# Patient Record
Sex: MALE | Race: WHITE | NOT HISPANIC OR LATINO | Employment: OTHER | URBAN - METROPOLITAN AREA
[De-identification: names, ages, dates, MRNs, and addresses within clinical notes are randomized per-mention and may not be internally consistent; named-entity substitution may affect disease eponyms.]

---

## 2017-10-09 DIAGNOSIS — M54.16 LUMBAR RADICULOPATHY: Primary | ICD-10-CM

## 2017-10-13 ENCOUNTER — HOSPITAL ENCOUNTER (OUTPATIENT)
Dept: RADIOLOGY | Facility: HOSPITAL | Age: 72
Discharge: HOME | End: 2017-10-13
Attending: FAMILY MEDICINE
Payer: MEDICARE

## 2017-10-13 DIAGNOSIS — M54.16 LUMBAR RADICULOPATHY: ICD-10-CM

## 2017-10-13 PROCEDURE — 72148 MRI LUMBAR SPINE W/O DYE: CPT | Mod: TC,PO

## 2017-11-02 ENCOUNTER — OFFICE VISIT (OUTPATIENT)
Dept: PAIN MEDICINE | Facility: CLINIC | Age: 72
End: 2017-11-02
Payer: MEDICARE

## 2017-11-02 VITALS
WEIGHT: 257 LBS | DIASTOLIC BLOOD PRESSURE: 80 MMHG | RESPIRATION RATE: 18 BRPM | BODY MASS INDEX: 38.95 KG/M2 | HEART RATE: 80 BPM | SYSTOLIC BLOOD PRESSURE: 128 MMHG | HEIGHT: 68 IN

## 2017-11-02 DIAGNOSIS — M47.816 LUMBAR SPONDYLOSIS: ICD-10-CM

## 2017-11-02 DIAGNOSIS — M54.42 BILATERAL LOW BACK PAIN WITH BILATERAL SCIATICA, UNSPECIFIED CHRONICITY: Primary | ICD-10-CM

## 2017-11-02 DIAGNOSIS — M54.41 BILATERAL LOW BACK PAIN WITH BILATERAL SCIATICA, UNSPECIFIED CHRONICITY: Primary | ICD-10-CM

## 2017-11-02 DIAGNOSIS — M51.36 DDD (DEGENERATIVE DISC DISEASE), LUMBAR: ICD-10-CM

## 2017-11-02 DIAGNOSIS — M48.061 SPINAL STENOSIS OF LUMBAR REGION, UNSPECIFIED WHETHER NEUROGENIC CLAUDICATION PRESENT: ICD-10-CM

## 2017-11-02 PROCEDURE — 99203 OFFICE O/P NEW LOW 30 MIN: CPT | Mod: S$GLB,,, | Performed by: ANESTHESIOLOGY

## 2017-11-02 PROCEDURE — 99999 PR PBB SHADOW E&M-EST. PATIENT-LVL III: CPT | Mod: PBBFAC,,, | Performed by: ANESTHESIOLOGY

## 2017-11-02 RX ORDER — PANTOPRAZOLE SODIUM 40 MG/1
40 TABLET, DELAYED RELEASE ORAL DAILY
Status: ON HOLD | COMMUNITY
Start: 2017-09-18 | End: 2021-03-14 | Stop reason: HOSPADM

## 2017-11-02 RX ORDER — ALLOPURINOL 300 MG/1
300 TABLET ORAL DAILY
COMMUNITY
Start: 2017-07-26 | End: 2018-10-29

## 2017-11-02 RX ORDER — GABAPENTIN 300 MG/1
CAPSULE ORAL
COMMUNITY
Start: 2017-09-18 | End: 2017-11-02

## 2017-11-02 RX ORDER — LEVOTHYROXINE SODIUM 100 UG/1
100 TABLET ORAL DAILY
COMMUNITY
Start: 2017-09-19

## 2017-11-02 NOTE — PROGRESS NOTES
Chronic Pain - New Consult    Referring Physician: Self, Aaareferral      Chief Complaint   Patient presents with    Back Pain        SUBJECTIVE:    Philip Kam Sr. is a 71 y/o male with hx of T2DM who presents to the clinic for the evaluation of low back pain. The pain started 1 month ago and symptoms have been persistent. He first noticed the pain after working on his deck moving heavy boards one month ago. The patient denies any history of chronic back problems. He reports an episode of sciatica >25 years ago that resolved with PT. The pain is located in the bilateral lower lumbar area and radiates into the posterior thighs stopping at the knee.   The pain is described as sharp and is rated as 0/10 currently. Average pain intensity is 6/10. The pain is rated with a score of  0/10 on the BEST day and a score of 7/10 on the WORST day.  Symptoms interfere with daily activity and sleeping. The pain is exacerbated by sitting and standing and for long periods of time.  The pain is mitigated by heat, activity, walking and bending forward at the waist. The patient reports 6 hours of uninterrupted sleep per night.    Patient denies night fever/night sweats, urinary incontinence, bowel incontinence, significant weight loss, significant motor weakness and loss of sensations.    Physical Therapy/Home Exercise: no      Pain Disability Index Review:  Last 3 PDI Scores 11/2/2017   Pain Disability Index (PDI) 33       Pain Medications:    - Tylenol arthritis BID as needed     report:  Reviewed    Pain Procedures: None    Imaging:     MRI LUMBAR SPINE WITHOUT CONTRAST (10/13/2017):      FINDINGS: No fracture or suspicious bone lesion identified. Lumbar spine alignment is anatomic. The distal cord and conus reveal normal signal and morphology terminating near the level of the L1 inferior endplate. No acute paravertebral soft tissue abnormality seen.  A small chronic limbus vertebra at L3 is noted.  There is severe L1  to, L2-3 and L4-5 degenerative disc disease with significant disc height loss, small implant Schmorl's nodes, circumferential endplate osteophytes and type I degenerative plate changes.  Degenerative changes will be further detailed below.    T12-L1: Minor annular disc bulge without significant spinal stenosis.    L1-2: Circumferential disc osteophyte complex, with mild facet arthropathy and ligamentum flavum hypertrophy causes mild central canal stenosis, moderate to severe right foraminal stenosis and mild left foraminal stenosis.  There is buckling and tortuosity of the cauda equina nerve roots at this level due to the more distal severe central canal stenosis at L2-3 and L3-4.    L2-3: Circumferential disc osteophyte complex, moderate hypertrophic facet arthropathy, ligamentum flavum hypertrophy and mild posterior epidural lipomatosis causing severe central canal stenosis with compression of the descending nerve roots.  There is moderate to severe bilateral foraminal stenosis.    L3-4: Annular disc bulge, small posterior lateral endplate osteophytes, mild hypertrophic facet arthropathy, ligamentum flavum hypertrophy and epidural lipomatosis causing severe central canal stenosis and moderate to severe bilateral foraminal stenosis.    L4-5: Prominent posterior disc osteophyte complex, eccentric left paracentral disc bulging, mild to moderate hypertrophic facet arthropathy, ligamentum flavum hypertrophy and mild epidural lipomatosis causes moderate central canal stenosis and severe lateral recess stenosis, left greater than right, with probable impingement of the descending left L5 nerve root.  There is severe bilateral foraminal stenosis with contact and probable impingement of the exiting bilateral L4 nerve roots.    L5-S1:  Small broad posterior disc bulge and mild facet arthropathy causing moderate to severe bilateral foraminal stenosis contacting and possibly impinging the exiting L5 nerve roots.  There is  epidural lipomatosis mildly compressing the thecal sac.      Past Medical History:   Diagnosis Date    Diabetes mellitus     High cholesterol     Hypertension      History reviewed. No pertinent surgical history.  Social History     Social History    Marital status:      Spouse name: N/A    Number of children: N/A    Years of education: N/A     Occupational History    Not on file.     Social History Main Topics    Smoking status: Never Smoker    Smokeless tobacco: Not on file    Alcohol use 0.6 oz/week     1 Cans of beer per week      Comment: everday    Drug use: No    Sexual activity: Not on file     Other Topics Concern    Not on file     Social History Narrative    No narrative on file     History reviewed. No pertinent family history.    Review of patient's allergies indicates:   Allergen Reactions    Codeine Itching       Current Outpatient Prescriptions   Medication Sig    allopurinol (ZYLOPRIM) 300 MG tablet     clonazePAM (KLONOPIN) 0.5 MG tablet Take 0.5 mg by mouth 2 (two) times daily as needed for Anxiety.    folic acid (FOLVITE) 800 MCG Tab Take 800 mcg by mouth once daily.    levothyroxine (SYNTHROID) 100 MCG tablet     lisinopril-hydrochlorothiazide (PRINZIDE,ZESTORETIC) 20-12.5 mg per tablet Take 1 tablet by mouth once daily.    metformin (GLUCOPHAGE) 500 MG tablet Take 500 mg by mouth 2 (two) times daily with meals.    pantoprazole (PROTONIX) 40 MG tablet     simvastatin (ZOCOR) 40 MG tablet Take 40 mg by mouth every evening.    levocetirizine (XYZAL) 5 MG tablet Take 5 mg by mouth every evening.     No current facility-administered medications for this visit.        REVIEW OF SYSTEMS:  GENERAL: No weight loss, malaise or fevers.  HEENT: Negative for frequent or significant headaches.  NECK: Negative for lumps or significant neck swelling.  RESPIRATORY: Negative for cough, wheezing or shortness of breath.  CARDIOVASCULAR: Negative for chest pain or palpitations.  GI:  "No blood in stools or black stools or change in bowel habits.  : Negative for kidney stones, urinary tract infections, or incontinence.  MUSCULOSKELETAL: See HPI  SKIN: Negative for lesions, rash, and itching.  PSYCH: + anxiety  HEMATOLOGY/LYMPHOLOGY Negative for prolonged bleeding, bruising easily or swollen nodes.  NEURO:  No history of syncope, seizures or tremors.    OBJECTIVE:    /80 (BP Location: Right arm, Patient Position: Sitting, BP Method: Large (Automatic))   Pulse 80   Resp 18   Ht 5' 8" (1.727 m)   Wt 116.6 kg (257 lb)   BMI 39.08 kg/m²     PHYSICAL EXAMINATION:  GENERAL: Well appearing, in no acute distress. Obese.  PSYCH:  Mood and affect is appropriate.  Awake, alert, and oriented x 3.  SKIN: Skin color, texture, turgor normal, no rashes or lesions  HEENT: Normocephalic, atraumatic.  EOM intact.  CV: Radial pulses are 2+.  RESP:  Respirations are unlabored.  GI: Abdomen soft and non-tender.  MSK:  No atrophy or tone abnormalities are noted.      Neck: No pain to palpation over the cervical paraspinous muscles.  No pain with neck flexion, extension, or lateral rotation.  No obvious deformity or signs of trauma.  Normal cervical spine range of motion.    Back: Straight leg raising in the sitting and supine positions is negative for  radicular pain. No pain to palpation over the lumbar spine and paraspinous muscles.  Negative for pain with facet loading and back extension/rotation. Decreased ROM on flexion and extension.    Buttocks:  No pain to palpation over the PSIS, sacroiliac joint maneuvers are negative for pain.    Extremities:  Peripheral joint ROM is full and pain free without obvious instability or laxity in all four extremities. No edema or skin discolorations noted.     Gait:  Gait is normal.    NEUR:  Bilateral lower extremity coordination and muscle stretch reflexes are physiologic and symmetric. Strength testing is 5/5 throughout all muscle groups in the upper and lower " extremities. No loss of sensation is noted.     ASSESSMENT: 72 y.o. male with one month history of low back and bilateral lower extremity pain. Lumbar MRI shows advanced multilevel lumbar spondylosis causing significant central canal and foraminal stenosis. Discussed options including PT and lumbar MICHELLE. The patient would like to proceed with trial of PT at this time.     1. Bilateral low back pain with bilateral sciatica, unspecified chronicity    2. Spinal stenosis of lumbar region, unspecified whether neurogenic claudication present    3. DDD (degenerative disc disease), lumbar    4. Lumbar spondylosis          PLAN:     - I have stressed the importance of physical activity and a home exercise plan to help with pain and improve health.  - Referral to Physical therapy for Lumbar stabilization, core strengthening, and a home exercise program.  - Will plan for lumbar MICHELLE if no improvement with PT.  - RTC in 6 weeks.  - Counseled patient regarding the importance of physical therapy and weight loss.    The above plan and management options were discussed at length with patient. Patient is in agreement with the above and verbalized understanding. It will be communicated with the referring physician via electronic record, fax, or mail.    Vicente Perdomo III  11/02/2017

## 2017-11-13 ENCOUNTER — CLINICAL SUPPORT (OUTPATIENT)
Dept: REHABILITATION | Facility: HOSPITAL | Age: 72
End: 2017-11-13
Payer: MEDICARE

## 2017-11-13 DIAGNOSIS — G89.29 CHRONIC BILATERAL LOW BACK PAIN WITH BILATERAL SCIATICA: ICD-10-CM

## 2017-11-13 DIAGNOSIS — M54.42 CHRONIC BILATERAL LOW BACK PAIN WITH BILATERAL SCIATICA: ICD-10-CM

## 2017-11-13 DIAGNOSIS — R29.898 WEAKNESS OF BOTH LOWER EXTREMITIES: ICD-10-CM

## 2017-11-13 DIAGNOSIS — M54.41 CHRONIC BILATERAL LOW BACK PAIN WITH BILATERAL SCIATICA: ICD-10-CM

## 2017-11-13 PROCEDURE — 97110 THERAPEUTIC EXERCISES: CPT | Performed by: PHYSICAL MEDICINE & REHABILITATION

## 2017-11-13 PROCEDURE — G8979 MOBILITY GOAL STATUS: HCPCS | Mod: CJ | Performed by: PHYSICAL MEDICINE & REHABILITATION

## 2017-11-13 PROCEDURE — 97161 PT EVAL LOW COMPLEX 20 MIN: CPT | Performed by: PHYSICAL MEDICINE & REHABILITATION

## 2017-11-13 PROCEDURE — G8978 MOBILITY CURRENT STATUS: HCPCS | Mod: CK | Performed by: PHYSICAL MEDICINE & REHABILITATION

## 2017-11-13 NOTE — PATIENT INSTRUCTIONS
Posture - Sitting    Sit upright, head facing forward. Scoot your tailbone all the way to the back of your chair. Lean slightly forward and place a rolled up towel at your waist. Lean back so shoulder blades lightly touch the back of the chair. Keep shoulders relaxed, and avoid rounded back. Keep hips level with knees. Avoid crossing legs for long periods.         Prone Press-Up    Press upper body upward, keeping hips in contact with floor. Keep lower back and buttocks relaxed. Hold 1-2 seconds then slowly lower.  Repeat 10 times per set. Do 1 sets per session. Do 3 sessions per day.      Extension in Standing    Place hands on back of hips and lean back, pushing hips gently forward. Repeat slowly and continuously.  Repeat 10 times per set. Do 1 sets per session. Do 3 sessions per day.        Lumbar Rotation  Feet on floor, slowly rock knees from side to side in small, pain-free range of motion. Allow lower back to rotate slightly, but keep shoulders flat on ground.   Repeat 10 times per set. Do 1 sets per session. Do 3 sessions per day.       Pelvic Tilt/TA contractions    Flatten back by tightening stomach muscles and buttocks. Do not hold your breath.  Hold 5 seconds.  Repeat 10 times per set. Do 1 sets per session. Do 3 sessions per day.      Supine: Leg Stretch With Strap (Basic)        Lie on back with one knee bent, foot flat on floor. Hook strap around other foot. Straighten knee. Keep knee level with other knee. Hold __10_ seconds. Relax leg completely down to floor.   Repeat _5__ times per session. Do _3__ sessions per day.    Piriformis Stretch, Supine        Lie supine, legs bent, feet flat. Raise one bent leg and, grasping ankle with both hands, pull leg toward opposite shoulder. Hold ___ seconds.   Repeat __5_ times per session. Do _3__ sessions per day. Perform with other leg straight.    Copyright © [employer]. All rights reserved.      Hip Flexor Stretch: 5 times hold for 10 count 3 times / day            Piriformis Stretch, Supine        Lie supine, legs bent, feet flat. Raise one bent leg and, grasping ankle with both hands, pull leg toward opposite shoulder. Hold ___ seconds.   Repeat ___ times per session. Do ___ sessions per day. Perform with other leg straight.    Copyright © [employer]. All rights reserved.

## 2017-11-13 NOTE — PLAN OF CARE
Name:Philip Castaneda Eddy Banuelos.  Physician:Vicente Perdomo*  Date of eval:11/13/2017  Orders:  Physical Therapy evaluate and treat  Clinic: [id number]  Diagnosis:  1. Chronic bilateral low back pain with bilateral sciatica     2. Weakness of both lower extremities         Precautions: None  Evaluation date: 11/13/2017  Visit # authorized: 1/12  Authorization period: 1/3/18  Plan of care expiration: 12/26/17    Start time: 2:00  PM   Stop Time: 2:55 PM    Timed     Procedure  Min     Units   TE x 2 25  2                    Untimed     Procedure  Min  Units   IE 30  1                 Subjective     Chief complaint: Patient states has had low back pain for about 2 months. Patient states was working on his deck and began feeling  Low back pain. States pain tends to come and go. Patient states pain radiates into posterior thighs bilaterally.   Onset: 2 months ago   Mechanism of onset :  gradual    Aggravating factors: standing, walking, twisting  Easing factors: sitting, icy hot pads; Tylenol  Sleep is disturbed. Sleeping position: sides and stomach  Loss of Bowel/Bladder Function: (-)   PLOF  Includes:Independent with all ADL's  Prior Physical Therapy: 25 years ago for his neck  Current functional limitations: standing, walking;     Home/Living Environment: lives with family with no step in home    Pertinent PMH/Comorbidities:  HTN, High Cholesterol, Diabetes    Patients structured exercise routine: none  Exercise routine prior to onset: None    Occupation: Pt is retired.      MRI:       FINDINGS: No fracture or suspicious bone lesion identified. Lumbar spine alignment is anatomic. The distal cord and conus reveal normal signal and morphology terminating near the level of the L1 inferior endplate. No acute paravertebral soft tissue abnormality seen.  A small chronic limbus vertebra at L3 is noted.  There is severe L1 to, L2-3 and L4-5 degenerative disc disease with significant disc height loss, small implant Schmorl's  nodes, circumferential endplate osteophytes and type I degenerative plate changes.  Degenerative changes will be further detailed below.    T12-L1: Minor annular disc bulge without significant spinal stenosis.    L1-2: Circumferential disc osteophyte complex, with mild facet arthropathy and ligamentum flavum hypertrophy causes mild central canal stenosis, moderate to severe right foraminal stenosis and mild left foraminal stenosis.  There is buckling and tortuosity of the cauda equina nerve roots at this level due to the more distal severe central canal stenosis at L2-3 and L3-4.    L2-3: Circumferential disc osteophyte complex, moderate hypertrophic facet arthropathy, ligamentum flavum hypertrophy and mild posterior epidural lipomatosis causing severe central canal stenosis with compression of the descending nerve roots.  There is moderate to severe bilateral foraminal stenosis.    L3-4: Annular disc bulge, small posterior lateral endplate osteophytes, mild hypertrophic facet arthropathy, ligamentum flavum hypertrophy and epidural lipomatosis causing severe central canal stenosis and moderate to severe bilateral foraminal stenosis.    L4-5: Prominent posterior disc osteophyte complex, eccentric left paracentral disc bulging, mild to moderate hypertrophic facet arthropathy, ligamentum flavum hypertrophy and mild epidural lipomatosis causes moderate central canal stenosis and severe lateral recess stenosis, left greater than right, with probable impingement of the descending left L5 nerve root.  There is severe bilateral foraminal stenosis with contact and probable impingement of the exiting bilateral L4 nerve roots.    L5-S1:  Small broad posterior disc bulge and mild facet arthropathy causing moderate to severe bilateral foraminal stenosis contacting and possibly impinging the exiting L5 nerve roots.  There is epidural lipomatosis mildly compressing the thecal sac.   Impression         Advanced multilevel lumbar  "spondylosis causing significant central canal and foraminal stenosis, as detailed above.                      Pain level with 0 being the lowest and 10 being the highest presently: 0/10  Pain level with 0 being the lowest and 10 being the highest at worst: 7/10  Pain level with 0 being the lowest and 10 being the highest at best: 0/10     Patient Goals: "I want to get rid of my pain"    Objective     Postural examination in standing and sitting:  -(+)  forward head  - (+) forward shoulders  - (-)  hip high  -(-) shoulder high  - (+) decreased lumbar lordosis  - (-) Thoracic kyphosis        Functional assessment:   - walking/gait:ambulates independent of AD with normal gait pattern  - sit to stand: Independent  - sit to supine: Independent       - supine to sit: Independent  - supine to prone: Independent     Flexibility testing:  - hamstrings:     90/90 test R -50 L -50           - gastrocnemius:   DF ankle R 10 degrees L 10 degrees  - piriformis: (+) (B)                 - quadriceps: (+) (B)                - hip flexors: (+) (B)  - hip adductors: (-)   - IT Bands: (-)    Lumbar ROM: (measured in degrees)    Degrees Quality   Flexion 40   With c/o pulling bilateral thighs   Extension 10   No c/o pain   Left Side Bending 30 No c/o pain   Right Side Bending 30  No c/o pain     AROM WNL bilateral lower extremities    Dermatomes: (impaired/normal)     RLE LLE   L2 Intact Intact   L3 Intact Intact   L4 Intact Intact   L5 Intact Intact   S1 Intact Intact     Muscle Strength  MMT R L   Hip flexion 5/5 5/5   Hip abduction 5/5 5/5   Hip extension 5/5 5/5   Hip ER 5/5 5/5   Hip IR 5/5 5/5   Knee extension 5/5 5/5   Knee flexion 5/5 5/5   Ankle dorsiflexion 5/5 5/5   Ankle plantar flexion 5/5 5/5   Ankle inversion 5/5 5/5   Ankle eversion 5/5 5/5     Reflexes: 2+ DTR BLE    Special Tests: ((+): pos.; (-): neg.)   · SLR Test: (-) (B)        SKC Test: (-) (b)   · Hip Screen:(-) WILLARD (b) for low back pain  · Repetitive Motion: " "(+) low back pain with flexion/   · Prone Instability: NT  · Saddle Sensation: (-)     Palpation for condition: No c/o pain on palpation lumbar spine      Joint Mobility:  Hypomobility     Endurance is Good    PT Evaluation Completed? Yes  Discussed Plan of Care with patient: Yes    Outcome measures:    Impairment function:  Mobility  FOTO score: 44/100  G-code current: CK at least 40% but < 60% impaired, limited or restricted    G-code goal: CJ at least 20%, but < 40% impaired, limited or restricted         Severity modifier selections based on the FOTO scoring, objective findings, and co-morbidity assessment    TREATMENT:  Therapeutic exercise: Philip received therapeutic exercises to develop strength, stabilization and endurance; flexibility and range of motion for 25 minutes including:see HEP sheet.     Date 11/13/17   Visit 1/12   Gcode Visits  1/10   POC Exp Date  Ins Auth Ends 12/26/17  1/3/18   FOTO 1/5   Medicare Charge 140.00   Medicare Total 140.00   Face to Face        LTR 1 x 10   Supine HSS (B) 5 x 10"   Supine Piriformis (s) (B) 5 x 10"   Supine Hip flexor (S) (B) 5 x 10"   TA Contraction 10 x 5"   Prone Press up 1 x 10   Standing Back Ext 1 x 10           Initials VK     Pt. Education:  -educated to use of lumbar roll with sitting  - Instructed pt. regarding:proper technique with all exercises. Pt. to demonstrate good understanding of the education provided. Philip demonstrated good return demonstration of activities. No cultural, environmental, or spiritual barriers identified to treatment or learning.    Assessment   This is a 72 y.o. male referred to outpatient physical therapy and presents with a medical diagnosis of low back pain with bilateral sciatica and PT diagnosis/findings of low back pain with bilateral sciatica, decreased AROM, strength, and flexibility, poor posture, and sciatic nerve neural tension bilaterally demonstrating limitations as described in the problem list. Patient " was in agreement with set goals and plan of care. Pt was given a written HEP along with posture education, instruction on body mechanics, activity modification/avoidance, and core strengthening and low back and LE stretching regimen. Pt. verbally understood instructions and demonstrated proper form/technique. Pt was advised to perform these exercises free of pain, and discontinue use if symptoms persist/worsen. Pt will benefit from physical therapy services in order to maximize pain free functional independence. Rehab potential is good    History  Co-morbidities and personal factors that may impact the plan of care Examination  Body Structures and Functions, activity limitations and participation restrictions that may impact the plan of care Clinical Presentation   Decision Making/ Complexity Score       Co-morbidities:   None      Personal Factors:   None     Body Regions:Lumbar spine    Body Systems: Musculoskeletal: decreased AROM, strength, and flexibility, poor posture, bilateral sciatic nerve neural tension tightness      Activity limitations: Mobilty      Participation Restrictions: General tasks and demands             Stable and uncomplicated       Low    FOTO Score:44/100         Medical necessity is demonstrated by the following IMPAIRMENTS/PROBLEM LIST:  Decreased range of motion lumbar spine  Decreased strength core, hips and back extensor muscles  Increased pain with walking  Bilateral sciatic nerve neural tension tightness  Poor posture  Increased pain with prolonged standing  Increased pain with prolonged sitting  Disturbed sleep  ADL and household activities lead to increased pain and are limited  Functional impairment rating of: 50%    GOALS:   Short Term Goals:  3 weeks  Increase range of motion 25%  Increase strength 1/2 muscle grade  Decrease bilateral sciatic nerve neural tension tightness by 50%  Patient will demonstrate good posture and body mechanics with ADL's  Be able to perform HEP with  minimal cueing required  Improve functional impairment of mobility to 30%    Long Term Goals: 6 weeks  Increase range of motion to 75% to 100% full   Improve muscle strength 1 muscle grade  Improve muscle strength with MMT to 4+/5 to 5/5  Bilateral sciatic nerve neural tension tightness will be absent  Restore ability to ambulate with normal pain free gait  Walking for ADL and exercise will be restored without increased pain  Restore ability to stand for ADL without increased pain  Restore ability to sit  without increased pain  Restore normal sleep habits without disturbances due to pain  Restore ability to perform ADL's and household activities independently and without increased pain  Improve functional impairment of mobility to 20% or less    Plan     Pt will be treated by physical therapy 2 times a week for 6 weeks to include: Therapeutic exercises to increase ROM, strength and stabilization; joint and soft tissue mobilization with manual therapy techniques to decrease muscle tightness, pain and improve joint mobility; neuromuscular re-education to improve balance, coordination, kinesthetic sense and proprioception, therapeutic activities to improve coordination, strength and function, therapeutic taping to decrease pain, provide support and improve function of the LE(s); modalities such as moist heat, ice, ultrasound and electrical stimulation to increase circulation, decrease pain and inflammation; dry needling with manual therapy techniques to decrease pain, inflammation and swelling, increase circulation and promote healing process will be considered and utilized as needed; aquatic physical therapy will be utilized as needed.  Pt may be seen by PTA to carry out plan of care as part of the Rehab team.    I certify the need for these services furnished under this plan of treatment and while under my care.    ____________________________________ Physician/Referring Practitioner                                   Date of Signature          Tracy Cueva, PT

## 2017-11-16 ENCOUNTER — CLINICAL SUPPORT (OUTPATIENT)
Dept: REHABILITATION | Facility: HOSPITAL | Age: 72
End: 2017-11-16
Payer: MEDICARE

## 2017-11-16 DIAGNOSIS — G89.29 CHRONIC BILATERAL LOW BACK PAIN WITH BILATERAL SCIATICA: ICD-10-CM

## 2017-11-16 DIAGNOSIS — M54.41 CHRONIC BILATERAL LOW BACK PAIN WITH BILATERAL SCIATICA: ICD-10-CM

## 2017-11-16 DIAGNOSIS — R29.898 WEAKNESS OF BOTH LOWER EXTREMITIES: ICD-10-CM

## 2017-11-16 DIAGNOSIS — M54.42 CHRONIC BILATERAL LOW BACK PAIN WITH BILATERAL SCIATICA: ICD-10-CM

## 2017-11-16 PROCEDURE — 97110 THERAPEUTIC EXERCISES: CPT | Performed by: PHYSICAL MEDICINE & REHABILITATION

## 2017-11-16 NOTE — PATIENT INSTRUCTIONS
Tighten stomach/core  Muscles as you lift each leg up like you are marching. Perform 10 times 2 times per day          DEAD BUG:  Tighten core/stomach muscles as you move opposite aki leg.  Perform  10 times 2 times per day                Hip Flexor Stretch  Standing with legs staggered and lean into front leg to stretch front of hip on back leg. Perform 5 times holding for 10 count 2 times  Per day        per day.      Arch/Relax    Sitting, slightly arch low back---hold for count of 5 --and then relax.  Perform 10 times 2 times per day

## 2017-11-16 NOTE — PROGRESS NOTES
"Name: Philip Kam .  Clinic Number: [id number]  Diagnosis:   Encounter Diagnoses   Name Primary?    Chronic bilateral low back pain with bilateral sciatica     Weakness of both lower extremities      Physician: Vicente Perdomo*  Treatment Orders: PT Eval and Treat  Past Medical History:   Diagnosis Date    Diabetes mellitus     High cholesterol     Hypertension        Precautions: None  Visit # authorized:  on 2017  Authorization period: 1/3/18  Plan of care expiration: 17    Start time: 7:55 AM  Stop Time: 835 AM    Timed     Procedure  Min     Units   TE x 2 30 2        TE x 1  (sup)  10 0          Untimed     Procedure  Min  Units                      Subjective     Pt reports: having pain in low back and into posterior thighs bilaterally    Pain Scale: before treatment: 7/10 currently; after treatment: 5/10    Objective       TREATMENT  Therapeutic exercise: Philip received therapeutic exercises to develop ROM, strength, and flexibility for 40 minutes includin:1 with PT x 30 mins/10 mins supervised    Date 17   Visit    Gcode Visits  2/10 1/10   POC Exp Date  Ins Auth Ends 12/26/17  1/3/18 12/26/17  1/3/18   FOTO    Medicare Charge 64.00 140.00   Medicare Total 204.00 140.00   Face to Face     Passive Sciatic Nerve Glides (B) 5 x 5"    LTR 1 x 10 1 x 10   Supine HSS (B) 10  x 10" 5 x 10"   Supine Piriformis (s) (B) 5  X 10" 5 x 10"   Supine Hip flexor (S) (B) -- 5 x 10"   TA Contraction 10 x 5" 10 x 5"   TA Marching 1  x10    TA Dead Bug 1  x10    Prone Press up 1  x10 1 x 10   Arch/Relax 10 x 5"    Standing Back Ext  1 x 10   Standing Hip Flexor Stretch 5 x 10"    Stationary Bike L1 x 5'    Initials VK VK       Written Home Exercises Provided: Core and back extensor strengthening; hip flexor stretch in standing  Pt demo good understanding of the education provided. Philip demonstrated good return demonstration of activities.     Pt. " education:  · Posture reeducation, body mechanics, HEP,   · No spiritual or educational barriers to learning provided  · Pt has no cultural, educational or language barriers to learning provided.    Assessment     Patient performed above exercises with verbal cueing for proper technique and tolerated addition of core and back extensor exercises and stationary bike. Modified hip flexor stretch to perform in standing. Patient instructed to continue with HEP . Pain decreased post treatment in low back and BLE's. Pt will continue to benefit from skilled outpatient physical therapy to address the remaining functional deficits, provide pt/family education, and to maximize pt's level of independence in the home and community environment. .     Goals:   Short Term Goals:  3 weeks  Increase range of motion 25%  Increase strength 1/2 muscle grade  Decrease bilateral sciatic nerve neural tension tightness by 50%  Patient will demonstrate good posture and body mechanics with ADL's  Be able to perform HEP with minimal cueing required  Improve functional impairment of mobility to 30%     Long Term Goals: 6 weeks  Increase range of motion to 75% to 100% full   Improve muscle strength 1 muscle grade  Improve muscle strength with MMT to 4+/5 to 5/5  Bilateral sciatic nerve neural tension tightness will be absent  Restore ability to ambulate with normal pain free gait  Walking for ADL and exercise will be restored without increased pain  Restore ability to stand for ADL without increased pain  Restore ability to sit  without increased pain  Restore normal sleep habits without disturbances due to pain  Restore ability to perform ADL's and household activities independently and without increased pain  Improve functional impairment of mobility to 20% or less    Anticipated barriers to physical therapy: None  Pt's spiritual, cultural and educational needs considered and pt agreeable to plan of care and goals        Plan   Continue with  established Plan of Care towards PT goals.              Tracy Cueva, PT

## 2017-11-24 DIAGNOSIS — I89.0 LYMPHEDEMA: Primary | ICD-10-CM

## 2017-11-28 ENCOUNTER — HOSPITAL ENCOUNTER (OUTPATIENT)
Dept: CARDIOLOGY | Facility: HOSPITAL | Age: 72
Discharge: HOME | End: 2017-11-28
Attending: FAMILY MEDICINE
Payer: MEDICARE

## 2017-11-28 DIAGNOSIS — I89.0 LYMPHEDEMA: ICD-10-CM

## 2017-11-28 LAB
DIASTOLIC DYSFUNCTION: YES
ESTIMATED PA SYSTOLIC PRESSURE: 17.44
MITRAL VALVE MOBILITY: NORMAL
RETIRED EF AND QEF - SEE NOTES: 55 (ref 55–65)
TRICUSPID VALVE REGURGITATION: ABNORMAL

## 2017-11-28 PROCEDURE — 93306 TTE W/DOPPLER COMPLETE: CPT | Mod: 26,,, | Performed by: INTERNAL MEDICINE

## 2017-11-28 PROCEDURE — 93306 TTE W/DOPPLER COMPLETE: CPT | Mod: PO

## 2017-12-13 ENCOUNTER — TELEPHONE (OUTPATIENT)
Dept: PAIN MEDICINE | Facility: CLINIC | Age: 72
End: 2017-12-13

## 2017-12-13 ENCOUNTER — OFFICE VISIT (OUTPATIENT)
Dept: PAIN MEDICINE | Facility: CLINIC | Age: 72
End: 2017-12-13
Payer: MEDICARE

## 2017-12-13 VITALS
SYSTOLIC BLOOD PRESSURE: 130 MMHG | BODY MASS INDEX: 37.94 KG/M2 | OXYGEN SATURATION: 97 % | DIASTOLIC BLOOD PRESSURE: 80 MMHG | HEART RATE: 93 BPM | WEIGHT: 250.31 LBS | HEIGHT: 68 IN

## 2017-12-13 DIAGNOSIS — M51.36 DDD (DEGENERATIVE DISC DISEASE), LUMBAR: ICD-10-CM

## 2017-12-13 DIAGNOSIS — M48.061 DEGENERATIVE LUMBAR SPINAL STENOSIS: Primary | ICD-10-CM

## 2017-12-13 DIAGNOSIS — M47.816 LUMBAR SPONDYLOSIS: ICD-10-CM

## 2017-12-13 PROCEDURE — 99214 OFFICE O/P EST MOD 30 MIN: CPT | Mod: S$GLB,,, | Performed by: ANESTHESIOLOGY

## 2017-12-13 PROCEDURE — 99999 PR PBB SHADOW E&M-EST. PATIENT-LVL III: CPT | Mod: PBBFAC,,, | Performed by: ANESTHESIOLOGY

## 2017-12-13 RX ORDER — TRAMADOL HYDROCHLORIDE 50 MG/1
50 TABLET ORAL EVERY 6 HOURS PRN
Qty: 40 TABLET | Refills: 0 | Status: SHIPPED | OUTPATIENT
Start: 2017-12-13 | End: 2017-12-23

## 2017-12-13 RX ORDER — FUROSEMIDE 40 MG/1
40 TABLET ORAL DAILY
Status: ON HOLD | COMMUNITY
Start: 2017-11-22 | End: 2021-03-14 | Stop reason: SDUPTHER

## 2017-12-13 NOTE — PROGRESS NOTES
Chronic Pain - Established    INTERVAL HISTORY (12/13/2017):    Philip Kam Sr. presents to the clinic today for a follow-up appointment for low back pain. Since the last visit, the pain has been persistent. He continues to experience pain in the bilateral low back area that radiates into the posterior thighs.  The patient reports no improvement with PT. He has been doing home exercises which also have not been helping. He takes Tylenol 3-4 x/day with minimal relief. Current pain intensity is 10/10.      SUBJECTIVE:    Philip Kam Sr. is a 73 y/o male with hx of T2DM who presents to the clinic for the evaluation of low back pain. The pain started 1 month ago and symptoms have been persistent. He first noticed the pain after working on his deck moving heavy boards one month ago. The patient denies any history of chronic back problems. He reports an episode of sciatica >25 years ago that resolved with PT. The pain is located in the bilateral lower lumbar area and radiates into the posterior thighs stopping at the knee.  The pain is described as sharp and is rated as 0/10 currently. Average pain intensity is 6/10. The pain is rated with a score of  0/10 on the BEST day and a score of 7/10 on the WORST day.  Symptoms interfere with daily activity and sleeping. The pain is exacerbated by sitting and standing and for long periods of time.  The pain is mitigated by heat, activity, walking and bending forward at the waist. The patient reports 6 hours of uninterrupted sleep per night.    Patient denies night fever/night sweats, urinary incontinence, bowel incontinence, significant weight loss, significant motor weakness and loss of sensations.    Physical Therapy/Home Exercise: no      Pain Disability Index Review:  Last 3 PDI Scores 11/2/2017   Pain Disability Index (PDI) 33       Pain Medications:    - Tylenol 3-4x/day as needed     report:  Reviewed    Pain Procedures: None    Imaging:     MRI LUMBAR SPINE  WITHOUT CONTRAST (10/13/2017):      FINDINGS: No fracture or suspicious bone lesion identified. Lumbar spine alignment is anatomic. The distal cord and conus reveal normal signal and morphology terminating near the level of the L1 inferior endplate. No acute paravertebral soft tissue abnormality seen.  A small chronic limbus vertebra at L3 is noted.  There is severe L1 to, L2-3 and L4-5 degenerative disc disease with significant disc height loss, small implant Schmorl's nodes, circumferential endplate osteophytes and type I degenerative plate changes.  Degenerative changes will be further detailed below.    T12-L1: Minor annular disc bulge without significant spinal stenosis.    L1-2: Circumferential disc osteophyte complex, with mild facet arthropathy and ligamentum flavum hypertrophy causes mild central canal stenosis, moderate to severe right foraminal stenosis and mild left foraminal stenosis.  There is buckling and tortuosity of the cauda equina nerve roots at this level due to the more distal severe central canal stenosis at L2-3 and L3-4.    L2-3: Circumferential disc osteophyte complex, moderate hypertrophic facet arthropathy, ligamentum flavum hypertrophy and mild posterior epidural lipomatosis causing severe central canal stenosis with compression of the descending nerve roots.  There is moderate to severe bilateral foraminal stenosis.    L3-4: Annular disc bulge, small posterior lateral endplate osteophytes, mild hypertrophic facet arthropathy, ligamentum flavum hypertrophy and epidural lipomatosis causing severe central canal stenosis and moderate to severe bilateral foraminal stenosis.    L4-5: Prominent posterior disc osteophyte complex, eccentric left paracentral disc bulging, mild to moderate hypertrophic facet arthropathy, ligamentum flavum hypertrophy and mild epidural lipomatosis causes moderate central canal stenosis and severe lateral recess stenosis, left greater than right, with probable  impingement of the descending left L5 nerve root.  There is severe bilateral foraminal stenosis with contact and probable impingement of the exiting bilateral L4 nerve roots.    L5-S1:  Small broad posterior disc bulge and mild facet arthropathy causing moderate to severe bilateral foraminal stenosis contacting and possibly impinging the exiting L5 nerve roots.  There is epidural lipomatosis mildly compressing the thecal sac.      Past Medical History:   Diagnosis Date    Diabetes mellitus     High cholesterol     Hypertension      History reviewed. No pertinent surgical history.  Social History     Social History    Marital status:      Spouse name: N/A    Number of children: N/A    Years of education: N/A     Occupational History    Not on file.     Social History Main Topics    Smoking status: Never Smoker    Smokeless tobacco: Not on file    Alcohol use 0.6 oz/week     1 Cans of beer per week      Comment: everday    Drug use: No    Sexual activity: Not on file     Other Topics Concern    Not on file     Social History Narrative    No narrative on file     History reviewed. No pertinent family history.    Review of patient's allergies indicates:   Allergen Reactions    Codeine Itching       Current Outpatient Prescriptions   Medication Sig    clonazePAM (KLONOPIN) 0.5 MG tablet Take 0.5 mg by mouth 2 (two) times daily as needed for Anxiety.    furosemide (LASIX) 40 MG tablet     levocetirizine (XYZAL) 5 MG tablet Take 5 mg by mouth every evening.    levothyroxine (SYNTHROID) 100 MCG tablet     lisinopril-hydrochlorothiazide (PRINZIDE,ZESTORETIC) 20-12.5 mg per tablet Take 1 tablet by mouth once daily.    metformin (GLUCOPHAGE) 500 MG tablet Take 500 mg by mouth 2 (two) times daily with meals.    pantoprazole (PROTONIX) 40 MG tablet     simvastatin (ZOCOR) 40 MG tablet Take 40 mg by mouth every evening.    allopurinol (ZYLOPRIM) 300 MG tablet     folic acid (FOLVITE) 800 MCG Tab  "Take 800 mcg by mouth once daily.    traMADol (ULTRAM) 50 mg tablet Take 1 tablet (50 mg total) by mouth every 6 (six) hours as needed for Pain.     No current facility-administered medications for this visit.        REVIEW OF SYSTEMS:  GENERAL: No weight loss, malaise or fevers.  HEENT: Negative for frequent or significant headaches.  NECK: Negative for lumps or significant neck swelling.  RESPIRATORY: Negative for cough, wheezing or shortness of breath.  CARDIOVASCULAR: Negative for chest pain or palpitations.  GI: No blood in stools or black stools or change in bowel habits.  : Negative for kidney stones, urinary tract infections, or incontinence.  MUSCULOSKELETAL: See HPI  SKIN: Negative for lesions, rash, and itching.  PSYCH: + anxiety  HEMATOLOGY/LYMPHOLOGY Negative for prolonged bleeding, bruising easily or swollen nodes.  NEURO:  No history of syncope, seizures or tremors.    OBJECTIVE:    /80 (BP Location: Right arm, Patient Position: Sitting, BP Method: Large (Manual))   Pulse 93   Ht 5' 8" (1.727 m)   Wt 113.5 kg (250 lb 4.8 oz)   SpO2 97%   BMI 38.06 kg/m²     PHYSICAL EXAMINATION:  GENERAL: Well appearing, in no acute distress. Obese.  PSYCH:  Mood and affect is appropriate.  Awake, alert, and oriented x 3.  SKIN: Skin color, texture, turgor normal, no rashes or lesions  HEENT: Normocephalic, atraumatic.  EOM intact.  CV: Radial pulses are 2+.  RESP:  Respirations are unlabored.  GI: Abdomen soft and non-tender.  MSK:  No atrophy or tone abnormalities are noted.      Neck: No pain with neck flexion, extension, or lateral rotation.  No obvious deformity or signs of trauma.  Normal cervical spine range of motion.    Back: Straight leg raising is negative for radicular pain. No pain to palpation over the lumbar spine and paraspinous muscles.  + pain with facet loading and back extension/rotation. Decreased ROM on flexion and extension.    Buttocks:  No pain to palpation over the " PSIS.    Extremities:  Peripheral joint ROM is full and pain free without obvious instability or laxity in all four extremities. No edema or skin discolorations noted.     Gait:  Gait is normal.    NEUR:  Bilateral lower extremity coordination and muscle stretch reflexes are physiologic and symmetric. Strength testing is 5/5 throughout all muscle groups in the upper and lower extremities. No loss of sensation is noted.     ASSESSMENT: 72 y.o. male with low back and bilateral lower extremity pain. Lumbar MRI shows advanced multilevel lumbar spondylosis causing significant central canal and foraminal stenosis.     1. Degenerative lumbar spinal stenosis    2. DDD (degenerative disc disease), lumbar    3. Lumbar spondylosis        PLAN:     - I have stressed the importance of physical activity and a home exercise plan to help with pain and improve health.  - Rx Tramadol 50 mg Q6 hours as needed for pain.  - Schedule the patient for a Bilateral S1 transforaminal epidural steroid injection to help with pain and progress with a home exercise plan.  - RTC after procedure.    The above plan and management options were discussed at length with patient. Patient is in agreement with the above and verbalized understanding. It will be communicated with the referring physician via electronic record, fax, or mail.    Vicente Perdomo III  12/13/2017

## 2017-12-18 ENCOUNTER — TELEPHONE (OUTPATIENT)
Dept: SURGERY | Facility: CLINIC | Age: 72
End: 2017-12-18

## 2017-12-18 NOTE — TELEPHONE ENCOUNTER
----- Message from Christel Perez LPN sent at 12/18/2017  2:33 PM CST -----  Contact: [phone]/ self       ----- Message -----  From: Mirella Walters  Sent: 12/18/2017   1:51 PM  To: Leanne RIVERA Staff    Pt called stating he has a procedure tomorrow but he doesn't know the time . Please advise     12/18/2017 @ 3:19 PM  Left message for patient to call same day surgery at [phone].

## 2017-12-19 PROBLEM — M51.369 DDD (DEGENERATIVE DISC DISEASE), LUMBAR: Status: ACTIVE | Noted: 2017-12-19

## 2017-12-19 PROBLEM — M51.36 DDD (DEGENERATIVE DISC DISEASE), LUMBAR: Status: ACTIVE | Noted: 2017-12-19

## 2017-12-19 PROBLEM — M48.061 DEGENERATIVE LUMBAR SPINAL STENOSIS: Status: ACTIVE | Noted: 2017-12-19

## 2017-12-21 ENCOUNTER — DOCUMENTATION ONLY (OUTPATIENT)
Dept: REHABILITATION | Facility: HOSPITAL | Age: 72
End: 2017-12-21

## 2017-12-21 DIAGNOSIS — G89.29 CHRONIC BILATERAL LOW BACK PAIN WITH BILATERAL SCIATICA: ICD-10-CM

## 2017-12-21 DIAGNOSIS — R29.898 WEAKNESS OF BOTH LOWER EXTREMITIES: ICD-10-CM

## 2017-12-21 DIAGNOSIS — M54.42 CHRONIC BILATERAL LOW BACK PAIN WITH BILATERAL SCIATICA: ICD-10-CM

## 2017-12-21 DIAGNOSIS — M54.41 CHRONIC BILATERAL LOW BACK PAIN WITH BILATERAL SCIATICA: ICD-10-CM

## 2017-12-21 NOTE — PROGRESS NOTES
Mr. Kam was seen in outpatient physical therapy for an initial evaluation for chronic low back pain on 11/13/17. Eddy Mcclelland attended one follow up visit and has self discharged as he has not returned for further physical therapy. POC has ended and patient is discharged from physical therapy.      · G-code current: CK at least 40% but < 60% impaired, limited or restricted    · G-code goal: CJ at least 20%, but < 40% impaired, limited or restricted         G-code discharge: CK at least 40% but < 60% impaired, limited or restricted            Tracy Cueva, PT

## 2017-12-28 ENCOUNTER — TELEPHONE (OUTPATIENT)
Dept: PAIN MEDICINE | Facility: CLINIC | Age: 72
End: 2017-12-28

## 2017-12-28 NOTE — TELEPHONE ENCOUNTER
"Patient called, he had a Bilateral S1 MICHELLE  on December 19, 2017. Patient said he took his metformin 500 mg the morning of surgery. The day after surgery "patient stated his blood sugar was 400." It is coming down everyday and this morning it was 188. I advised patient to call Dr.Dominic Chase who is his PCP and following his blood sugar. Patient voice understanding.  "

## 2018-01-09 ENCOUNTER — OFFICE VISIT (OUTPATIENT)
Dept: PAIN MEDICINE | Facility: CLINIC | Age: 73
End: 2018-01-09
Payer: MEDICARE

## 2018-01-09 VITALS
SYSTOLIC BLOOD PRESSURE: 130 MMHG | HEART RATE: 95 BPM | WEIGHT: 242.69 LBS | DIASTOLIC BLOOD PRESSURE: 70 MMHG | OXYGEN SATURATION: 97 % | BODY MASS INDEX: 36.78 KG/M2 | HEIGHT: 68 IN

## 2018-01-09 DIAGNOSIS — M51.36 DDD (DEGENERATIVE DISC DISEASE), LUMBAR: ICD-10-CM

## 2018-01-09 DIAGNOSIS — M47.816 LUMBAR SPONDYLOSIS: Primary | ICD-10-CM

## 2018-01-09 DIAGNOSIS — M48.061 DEGENERATIVE LUMBAR SPINAL STENOSIS: ICD-10-CM

## 2018-01-09 PROCEDURE — 99999 PR PBB SHADOW E&M-EST. PATIENT-LVL V: CPT | Mod: PBBFAC,,, | Performed by: ANESTHESIOLOGY

## 2018-01-09 PROCEDURE — 99214 OFFICE O/P EST MOD 30 MIN: CPT | Mod: S$GLB,,, | Performed by: ANESTHESIOLOGY

## 2018-01-09 RX ORDER — GABAPENTIN 300 MG/1
300 CAPSULE ORAL 3 TIMES DAILY
Qty: 90 CAPSULE | Refills: 11 | Status: SHIPPED | OUTPATIENT
Start: 2018-01-09 | End: 2018-10-29

## 2018-01-09 NOTE — PROGRESS NOTES
Chronic Pain - Established          INTERVAL HISTORY (01/09/2018):    Philip Kam Sr. presents to the clinic today for a follow-up appointment for low back pain. The patient reports only 20% pain relief after Bilateral S1 TESI. He reports improvement in his posterior thigh pain. His low back pain remains persistent. Current pain intensity is 8/10. The pain is located along the lumbosacral junction. He experiences the pain when standing upright and with back extension. He has been performing home stretches which are helping.  He reports no relief with the Tramadol. He has been taking 3 Tylenol ES 3x/day.       INTERVAL HISTORY (12/13/2017):    Philip Kam Sr. presents to the clinic today for a follow-up appointment for low back pain. Since the last visit, the pain has been persistent. He continues to experience pain in the bilateral low back area that radiates into the posterior thighs.  The patient reports no improvement with PT. He has been doing home exercises which also have not been helping. He takes Tylenol 3-4 x/day with minimal relief. Current pain intensity is 10/10.      SUBJECTIVE:    Philip Kam Sr. is a 71 y/o male with hx of T2DM who presents to the clinic for the evaluation of low back pain. The pain started 1 month ago and symptoms have been persistent. He first noticed the pain after working on his deck moving heavy boards one month ago. The patient denies any history of chronic back problems. He reports an episode of sciatica >25 years ago that resolved with PT. The pain is located in the bilateral lower lumbar area and radiates into the posterior thighs stopping at the knee.  The pain is described as sharp and is rated as 0/10 currently. Average pain intensity is 6/10. The pain is rated with a score of  0/10 on the BEST day and a score of 7/10 on the WORST day.  Symptoms interfere with daily activity and sleeping. The pain is exacerbated by sitting and standing and for long periods of  time.  The pain is mitigated by heat, activity, walking and bending forward at the waist. The patient reports 6 hours of uninterrupted sleep per night.    Patient denies night fever/night sweats, urinary incontinence, bowel incontinence, significant weight loss, significant motor weakness and loss of sensations.    Physical Therapy/Home Exercise: no      Pain Disability Index Review:  Last 3 PDI Scores 1/9/2018 11/2/2017   Pain Disability Index (PDI) 38 33       Pain Medications:    - Tylenol ES 3 3x/day as needed     report:  Reviewed    Pain Procedures:   Bilateral S1 TESI (12/19/2017)    Imaging:     MRI LUMBAR SPINE WITHOUT CONTRAST (10/13/2017):      FINDINGS: No fracture or suspicious bone lesion identified. Lumbar spine alignment is anatomic. The distal cord and conus reveal normal signal and morphology terminating near the level of the L1 inferior endplate. No acute paravertebral soft tissue abnormality seen.  A small chronic limbus vertebra at L3 is noted.  There is severe L1 to, L2-3 and L4-5 degenerative disc disease with significant disc height loss, small implant Schmorl's nodes, circumferential endplate osteophytes and type I degenerative plate changes.  Degenerative changes will be further detailed below.    T12-L1: Minor annular disc bulge without significant spinal stenosis.    L1-2: Circumferential disc osteophyte complex, with mild facet arthropathy and ligamentum flavum hypertrophy causes mild central canal stenosis, moderate to severe right foraminal stenosis and mild left foraminal stenosis.  There is buckling and tortuosity of the cauda equina nerve roots at this level due to the more distal severe central canal stenosis at L2-3 and L3-4.    L2-3: Circumferential disc osteophyte complex, moderate hypertrophic facet arthropathy, ligamentum flavum hypertrophy and mild posterior epidural lipomatosis causing severe central canal stenosis with compression of the descending nerve roots.  There is  "moderate to severe bilateral foraminal stenosis.    L3-4: Annular disc bulge, small posterior lateral endplate osteophytes, mild hypertrophic facet arthropathy, ligamentum flavum hypertrophy and epidural lipomatosis causing severe central canal stenosis and moderate to severe bilateral foraminal stenosis.    L4-5: Prominent posterior disc osteophyte complex, eccentric left paracentral disc bulging, mild to moderate hypertrophic facet arthropathy, ligamentum flavum hypertrophy and mild epidural lipomatosis causes moderate central canal stenosis and severe lateral recess stenosis, left greater than right, with probable impingement of the descending left L5 nerve root.  There is severe bilateral foraminal stenosis with contact and probable impingement of the exiting bilateral L4 nerve roots.    L5-S1:  Small broad posterior disc bulge and mild facet arthropathy causing moderate to severe bilateral foraminal stenosis contacting and possibly impinging the exiting L5 nerve roots.  There is epidural lipomatosis mildly compressing the thecal sac.      Past Medical History:   Diagnosis Date    Diabetes mellitus     High cholesterol     Hypertension      Past Surgical History:   Procedure Laterality Date    Eshophagus Tear       Social History     Social History    Marital status:      Spouse name: N/A    Number of children: N/A    Years of education: N/A     Occupational History    Not on file.     Social History Main Topics    Smoking status: Never Smoker    Smokeless tobacco: Never Used    Alcohol use 0.6 oz/week     1 Cans of beer per week      Comment: Everyother day_Pt stated,"I drink a few beers."    Drug use: No    Sexual activity: Not on file     Other Topics Concern    Not on file     Social History Narrative    No narrative on file     Family History   Problem Relation Age of Onset    Heart attack Son        Review of patient's allergies indicates:   Allergen Reactions    Codeine Itching " "      Current Outpatient Prescriptions   Medication Sig    allopurinol (ZYLOPRIM) 300 MG tablet Take 300 mg by mouth once daily.     clonazePAM (KLONOPIN) 0.5 MG tablet Take 0.5 mg by mouth as needed for Anxiety. Once    furosemide (LASIX) 40 MG tablet 40 mg once daily.     levocetirizine (XYZAL) 5 MG tablet Take 5 mg by mouth as needed.     levothyroxine (SYNTHROID) 100 MCG tablet Take 100 mcg by mouth once daily.     metformin (GLUCOPHAGE) 500 MG tablet Take 500 mg by mouth 2 (two) times daily with meals.    pantoprazole (PROTONIX) 40 MG tablet Take 40 mg by mouth once daily.     simvastatin (ZOCOR) 40 MG tablet Take 40 mg by mouth every evening.    folic acid (FOLVITE) 800 MCG Tab Take 800 mcg by mouth once daily.    gabapentin (NEURONTIN) 300 MG capsule Take 1 capsule (300 mg total) by mouth 3 (three) times daily. Take 1 cap QHS x 3 days. Increase by 1 cap every 3 days until taking TID.    lisinopril-hydrochlorothiazide (PRINZIDE,ZESTORETIC) 20-12.5 mg per tablet Take 1 tablet by mouth once daily.     No current facility-administered medications for this visit.        REVIEW OF SYSTEMS:  GENERAL: No weight loss, malaise or fevers.  HEENT: Negative for frequent or significant headaches.  NECK: Negative for lumps or significant neck swelling.  RESPIRATORY: Negative for cough, wheezing or shortness of breath.  CARDIOVASCULAR: Negative for chest pain or palpitations.  GI: No blood in stools or black stools or change in bowel habits.  : Negative for kidney stones, urinary tract infections, or incontinence.  MUSCULOSKELETAL: See HPI  SKIN: Negative for lesions, rash, and itching.  PSYCH: + anxiety  HEMATOLOGY/LYMPHOLOGY Negative for prolonged bleeding, bruising easily or swollen nodes.  NEURO:  No history of syncope, seizures or tremors.    OBJECTIVE:    /70 (BP Location: Left arm, Patient Position: Sitting, BP Method: Large (Manual))   Pulse 95   Ht 5' 8" (1.727 m)   Wt 110.1 kg (242 lb 11.2 oz) "   SpO2 97%   BMI 36.90 kg/m²     PHYSICAL EXAMINATION:  GENERAL: Well appearing, in no acute distress. Obese.  PSYCH:  Mood and affect is appropriate.  Awake, alert, and oriented x 3.  SKIN: Skin color, texture, turgor normal, no rashes or lesions  HEENT: Normocephalic, atraumatic.  EOM intact.  CV: Radial pulses are 2+.  RESP:  Respirations are unlabored.  GI: Abdomen soft and non-tender.  MSK:  No atrophy or tone abnormalities are noted.      Neck: No pain with neck flexion, extension, or lateral rotation.  No obvious deformity or signs of trauma.  Normal cervical spine range of motion.    Back: Straight leg raising is negative for radicular pain. No pain to palpation over the lumbar spine and paraspinous muscles.  + pain with facet loading and back extension. Decreased ROM on flexion and extension.    Buttocks:  No pain to palpation over the PSIS.    Extremities:  Peripheral joint ROM is full and pain free without obvious instability or laxity in all four extremities. No edema or skin discolorations noted.     Gait:  Gait is normal.    NEUR:  Strength testing is 5/5 throughout all muscle groups in the upper and lower extremities. No loss of sensation is noted.     ASSESSMENT: 72 y.o. male with chronic low back and bilateral lower extremity pain. Lumbar MRI shows advanced multilevel lumbar spondylosis causing significant central canal and foraminal stenosis.     1. Lumbar spondylosis    2. Degenerative lumbar spinal stenosis    3. DDD (degenerative disc disease), lumbar        PLAN:     - I have stressed the importance of physical activity and a home exercise plan to help with pain and improve health.  - Start Gabapentin 300 mg and titrate up to three times a day as tolerated.  - Advised the patient to stay below 4g /day of Tylenol.  - Schedule for Diagnostic/Therapeutic Bilateral L4/5 and L5/S1 facet medial branch blocks to determine if his low back pain is facet mediated.  - RTC after procedure.    The above  plan and management options were discussed at length with patient. Patient is in agreement with the above and verbalized understanding. It will be communicated with the referring physician via electronic record, fax, or mail.    Vicente Perdomo III  01/09/2018

## 2018-01-15 ENCOUNTER — TELEPHONE (OUTPATIENT)
Dept: PAIN MEDICINE | Facility: CLINIC | Age: 73
End: 2018-01-15

## 2018-01-15 NOTE — TELEPHONE ENCOUNTER
SPoke to patient. He states that he is using a walker and no longer can only use a cane. He told me that he has a procedure scheduled next week on 1/23 but insists on being seen sooner because of the increase in pain. I asked patient if he had an injury lately or had fallen. Pt said no but that his pain is increasing significantly. He asked to be seen today, but I told him that NIRMAL URBINA was out and scheduled him for Wed in [facility] at 10. Pt verbalized understanding  -- Message from Teagan Moreno sent at 1/15/2018  8:45 AM CST -----  Contact: [phone] self  Patient requested to speak with the nurse and advised he is having pains in his leg. Please call and advise.

## 2018-01-23 PROBLEM — M47.816 LUMBAR SPONDYLOSIS: Status: ACTIVE | Noted: 2018-01-23

## 2018-01-24 ENCOUNTER — TELEPHONE (OUTPATIENT)
Dept: PAIN MEDICINE | Facility: CLINIC | Age: 73
End: 2018-01-24

## 2018-01-24 DIAGNOSIS — M48.061 DEGENERATIVE LUMBAR SPINAL STENOSIS: Primary | ICD-10-CM

## 2018-01-24 DIAGNOSIS — M51.36 DDD (DEGENERATIVE DISC DISEASE), LUMBAR: ICD-10-CM

## 2018-01-24 DIAGNOSIS — M47.816 LUMBAR SPONDYLOSIS: ICD-10-CM

## 2018-01-24 RX ORDER — HYDROCODONE BITARTRATE AND ACETAMINOPHEN 5; 325 MG/1; MG/1
1 TABLET ORAL EVERY 8 HOURS PRN
Qty: 40 TABLET | Refills: 0 | Status: ON HOLD | OUTPATIENT
Start: 2018-01-24 | End: 2018-03-16 | Stop reason: HOSPADM

## 2018-01-24 NOTE — TELEPHONE ENCOUNTER
Spoke with patient to let him know  referred him to . I made his appointment for Tuesday February 20, 2018 at 9:00 AM. Also his pain medicine was called into his pharmacy.  Patient voice understanding.

## 2018-02-20 ENCOUNTER — TELEPHONE (OUTPATIENT)
Dept: NEUROSURGERY | Facility: CLINIC | Age: 73
End: 2018-02-20

## 2018-02-20 ENCOUNTER — INITIAL CONSULT (OUTPATIENT)
Dept: NEUROSURGERY | Facility: CLINIC | Age: 73
End: 2018-02-20
Payer: MEDICARE

## 2018-02-20 VITALS
WEIGHT: 244.06 LBS | HEART RATE: 75 BPM | DIASTOLIC BLOOD PRESSURE: 88 MMHG | BODY MASS INDEX: 37.11 KG/M2 | SYSTOLIC BLOOD PRESSURE: 151 MMHG

## 2018-02-20 DIAGNOSIS — M48.061 FORAMINAL STENOSIS OF LUMBAR REGION: ICD-10-CM

## 2018-02-20 DIAGNOSIS — R93.7 ABNORMAL X-RAY OF LUMBAR SPINE: ICD-10-CM

## 2018-02-20 DIAGNOSIS — M48.061 BILATERAL STENOSIS OF LATERAL RECESS OF LUMBAR SPINE: Primary | ICD-10-CM

## 2018-02-20 DIAGNOSIS — M54.17 LUMBOSACRAL RADICULOPATHY AT L4: ICD-10-CM

## 2018-02-20 DIAGNOSIS — M48.061 LUMBAR STENOSIS WITHOUT NEUROGENIC CLAUDICATION: ICD-10-CM

## 2018-02-20 DIAGNOSIS — M48.061 SPINAL STENOSIS OF LUMBAR REGION, UNSPECIFIED WHETHER NEUROGENIC CLAUDICATION PRESENT: Primary | ICD-10-CM

## 2018-02-20 PROCEDURE — 1159F MED LIST DOCD IN RCRD: CPT | Mod: S$GLB,,, | Performed by: NEUROLOGICAL SURGERY

## 2018-02-20 PROCEDURE — 99205 OFFICE O/P NEW HI 60 MIN: CPT | Mod: S$GLB,,, | Performed by: NEUROLOGICAL SURGERY

## 2018-02-20 PROCEDURE — 3008F BODY MASS INDEX DOCD: CPT | Mod: S$GLB,,, | Performed by: NEUROLOGICAL SURGERY

## 2018-02-20 PROCEDURE — 99999 PR PBB SHADOW E&M-EST. PATIENT-LVL III: CPT | Mod: PBBFAC,,, | Performed by: NEUROLOGICAL SURGERY

## 2018-02-20 PROCEDURE — 1125F AMNT PAIN NOTED PAIN PRSNT: CPT | Mod: S$GLB,,, | Performed by: NEUROLOGICAL SURGERY

## 2018-02-20 RX ORDER — MULTIVITAMIN
1 TABLET ORAL DAILY
COMMUNITY
End: 2023-03-14

## 2018-02-20 NOTE — LETTER
February 20, 2018      Vicente Perdomo III, MD at [facility], [address] [address]           [address], [address]  [facility] [address]  Phone: [phone]          Patient: Philip Kam Sr.   MR Number: [id number]   YOB: 1945   Date of Visit: 2/20/2018       Dear Dr. Vicente Perdomo III:    Thank you for referring Philip Kam to me for evaluation. Attached you will find relevant portions of my assessment and plan of care.    If you have questions, please do not hesitate to call me. I look forward to following Philip Kam along with you.    Sincerely,    Riley Diaz MD    Enclosure  CC:  No Recipients    If you would like to receive this communication electronically, please contact [email or web address] or [phone] to request more information on [facility] [employer] access.    For providers and/or their staff who would like to refer a patient to [facility], please contact us through our one-stop-shop provider referral line, [facility], at [phone].    If you feel you have received this communication in error or would no longer like to receive these types of communications, please e-mail [email or web address]

## 2018-02-20 NOTE — PROGRESS NOTES
NEUROSURGICAL OUTPATIENT CONSULTATION NOTE    DATE OF SERVICE:  02/20/2018    ATTENDING PHYSICIAN:  Riley Diaz MD    CONSULT REQUESTED BY:  NIRMAL Perdomo MD    REASON FOR CONSULT:  Bilateral legs pain    SUBJECTIVE:    HISTORY OF PRESENT ILLNESS:  This is a very pleasant 72 y.o. male who has been complaining for bilateral legs pain in the L4 distribution for more than 4 months. The pain has been worsening. He has bilateral dorsiflexion weakness and difficulty walking. He walks leaning forward. The legs pain is triggered by standing up and walking and relieved by sitting and lying down. He had 6 weeks of PT and epidural steroid injections without significant improvement. He does not have back pain.     Low Back Pain Scale  R Low Back-Pain Score: 6  R Low Back-Pain Intensity: Pain killers give very little relief from pain  R Low Back-Pain Score: I need some help, but manage most of my personal care  Low Back-Lifting: I can only lift very light weights   Low Back-Walking: I can only walk using a cane or crutches   Low Back-Sitting: I can sit in any chair as long as I like   Low Back-Standing: I cannot stand for longer than 10 minutes with increasing pain   Low Back-Sleeping: I have no pain in bed   Low Back-Social Life: Pain has restricted my social life and I do not go out very often   Low Back-Traveling: I have no pain when traveling   Low Back-Changing Degree of Pain: My pain seems to be getting better but improvement is slow         PAST MEDICAL HISTORY:  Active Ambulatory Problems     Diagnosis Date Noted    Degenerative lumbar spinal stenosis 12/19/2017    DDD (degenerative disc disease), lumbar 12/19/2017    Lumbar spondylosis 01/23/2018     Resolved Ambulatory Problems     Diagnosis Date Noted    Chronic bilateral low back pain with bilateral sciatica 11/13/2017    Weakness of both lower extremities 11/13/2017     Past Medical History:   Diagnosis Date    Anxiety     Diabetes mellitus     High  "cholesterol     Hypertension     Thyroid disease        PAST SURGICAL HISTORY:  Past Surgical History:   Procedure Laterality Date    COLONOSCOPY      Eshophagus Tear      EYE SURGERY      TONSILLECTOMY         SOCIAL HISTORY:   Social History     Social History    Marital status:      Spouse name: N/A    Number of children: N/A    Years of education: N/A     Occupational History    Not on file.     Social History Main Topics    Smoking status: Never Smoker    Smokeless tobacco: Never Used    Alcohol use 0.6 oz/week     1 Cans of beer per week      Comment: Everyother day_Pt stated,"I drink a few beers."    Drug use: No    Sexual activity: Not on file     Other Topics Concern    Not on file     Social History Narrative    No narrative on file       FAMILY HISTORY:  Family History   Problem Relation Age of Onset    Heart attack Son        CURRENTS MEDICATIONS:  Current Outpatient Prescriptions on File Prior to Visit   Medication Sig Dispense Refill    allopurinol (ZYLOPRIM) 300 MG tablet Take 300 mg by mouth once daily.       clonazePAM (KLONOPIN) 0.5 MG tablet Take 0.5 mg by mouth as needed for Anxiety. Once      furosemide (LASIX) 40 MG tablet 40 mg once daily.       hydrocodone-acetaminophen 5-325mg (NORCO) 5-325 mg per tablet Take 1 tablet by mouth every 8 (eight) hours as needed for Pain. 40 tablet 0    levothyroxine (SYNTHROID) 100 MCG tablet Take 100 mcg by mouth once daily.       metformin (GLUCOPHAGE) 500 MG tablet Take 500 mg by mouth 2 (two) times daily with meals.      pantoprazole (PROTONIX) 40 MG tablet Take 40 mg by mouth once daily.       simvastatin (ZOCOR) 40 MG tablet Take 40 mg by mouth every evening.      gabapentin (NEURONTIN) 300 MG capsule Take 1 capsule (300 mg total) by mouth 3 (three) times daily. Take 1 cap QHS x 3 days. Increase by 1 cap every 3 days until taking TID. 90 capsule 11    levocetirizine (XYZAL) 5 MG tablet Take 5 mg by mouth as needed.    "    lisinopril-hydrochlorothiazide (PRINZIDE,ZESTORETIC) 20-12.5 mg per tablet Take 1 tablet by mouth once daily.      SITagliptin (JANUVIA) 100 MG Tab Take 100 mg by mouth once daily.       No current facility-administered medications on file prior to visit.        ALLERGIES:  Review of patient's allergies indicates:   Allergen Reactions    Codeine Itching       REVIEW OF SYSTEMS:  Review of Systems   Constitutional: Negative for diaphoresis, fever and weight loss.   Respiratory: Negative for shortness of breath.    Cardiovascular: Negative for chest pain.   Gastrointestinal: Negative for blood in stool.   Genitourinary: Negative for hematuria.   Endo/Heme/Allergies: Does not bruise/bleed easily.   All other systems reviewed and are negative.      OBJECTIVE:    PHYSICAL EXAMINATION:   Vitals:    02/20/18 0948   BP: (!) 151/88   Pulse: 75       Physical Exam:  Vitals reviewed.    Constitutional: He appears well-developed and well-nourished.     Eyes: Pupils are equal, round, and reactive to light. Conjunctivae and EOM are normal.     Cardiovascular: Normal distal pulses and no edema.     Abdominal: Soft.     Skin: Skin displays no rash on trunk and no rash on extremities. Skin displays no lesions on trunk and no lesions on extremities.     Psych/Behavior: He is alert. He is oriented to person, place, and time. He has a normal mood and affect.     Musculoskeletal:        Neck: Range of motion is full.     Neurological:        DTRs: Tricep reflexes are 2+ on the right side and 2+ on the left side. Bicep reflexes are 2+ on the right side and 2+ on the left side. Brachioradialis reflexes are 2+ on the right side and 2+ on the left side. Patellar reflexes are 0 on the right side and 0 on the left side. Achilles reflexes are 0 on the right side and 0 on the left side.       Back Exam     Tenderness   The patient is experiencing no tenderness.     Range of Motion   Extension: normal   Flexion: normal   Lateral Bend  Right: normal   Lateral Bend Left: normal   Rotation Right: normal   Rotation Left: normal     Muscle Strength   Right Quadriceps:  5/5   Left Quadriceps:  5/5   Right Hamstrings:  5/5   Left Hamstrings:  5/5     Tests   Straight leg raise right: negative  Straight leg raise left: negative    Other   Toe Walk: normal  Heel Walk: normal                Neurologic Exam     Mental Status   Oriented to person, place, and time.   Speech: speech is normal   Level of consciousness: alert    Cranial Nerves   Cranial nerves II through XII intact.     CN III, IV, VI   Pupils are equal, round, and reactive to light.  Extraocular motions are normal.     Motor Exam   Muscle bulk: normal  Overall muscle tone: normal    Strength   Right deltoid: 5/5  Left deltoid: 5/5  Right biceps: 5/5  Left biceps: 5/5  Right triceps: 5/5  Left triceps: 5/5  Right wrist flexion: 5/5  Left wrist flexion: 5/5  Right wrist extension: 5/5  Left wrist extension: 5/5  Right interossei: 5/5  Left interossei: 5/5  Right iliopsoas: 5/5  Left iliopsoas: 5/5  Right quadriceps: 5/5  Left quadriceps: 5/5  Right hamstrin/5  Left hamstrin/5  Right anterior tibial: 4/5  Left anterior tibial: 4/5  Right posterior tibial: 5/5  Left posterior tibial: 5/5  Right peroneal: 5/5  Left peroneal: 5/5  Right gastroc: 5/5  Left gastroc: 5/5    Sensory Exam   Light touch normal.   Pinprick normal.     Gait, Coordination, and Reflexes     Gait  Gait: (antalgic)    Coordination   Finger to nose coordination: normal  Tandem walking coordination: normal    Reflexes   Right brachioradialis: 2+  Left brachioradialis: 2+  Right biceps: 2+  Left biceps: 2+  Right triceps: 2+  Left triceps: 2+  Right patellar: 0  Left patellar: 0  Right achilles: 0  Left achilles: 0  Right plantar: normal  Left plantar: normal  Right Velazquez: absent  Left Velazquez: absent  Right ankle clonus: absent  Left ankle clonus: absent        DIAGNOSTIC DATA:  I personally reviewed the following  imaging:   Lumbar spine MRI 10/2017: diffuse spondylosis, bilateral severe L4-5 lateral recess and foraminal stenosis    ASSESMENT:  This is a 72 y.o. male with     Problem List Items Addressed This Visit     None      Visit Diagnoses     Bilateral stenosis of lateral recess of lumbar spine    -  Primary    Foraminal stenosis of lumbar region        Lumbosacral radiculopathy at L4              PLAN:  I explained the natural history of the disease and all treatment options. I recommended a bilateral L4-5 contralateral laminectomy, medial facetectomy and foraminotomy.     We have discussed the risks of surgery including bleeding, infection, failure of surgery, CSF leak, nerve root injury, spinal cord injury, weakness, paralysis, peripheral neuropathy, need for reoperation. Patient understands the risks and would like to proceed with surgery.    Repeat lumbar MRI before the surgery since new onset of L4 distribution motor weakness  Lumbar XR  The patient has increased perioperative risks because of these comorbidities: type 2 diabetes.         Riley Diaz MD  Pager: [phone]

## 2018-02-21 ENCOUNTER — TELEPHONE (OUTPATIENT)
Dept: NEUROSURGERY | Facility: CLINIC | Age: 73
End: 2018-02-21

## 2018-02-21 DIAGNOSIS — M54.16 LUMBAR RADICULOPATHY: ICD-10-CM

## 2018-02-21 DIAGNOSIS — M48.061 BILATERAL STENOSIS OF LATERAL RECESS OF LUMBAR SPINE: Primary | ICD-10-CM

## 2018-02-26 ENCOUNTER — HOSPITAL ENCOUNTER (OUTPATIENT)
Dept: RADIOLOGY | Facility: HOSPITAL | Age: 73
Discharge: HOME | End: 2018-02-26
Attending: NEUROLOGICAL SURGERY
Payer: MEDICARE

## 2018-02-26 ENCOUNTER — TELEPHONE (OUTPATIENT)
Dept: NEUROSURGERY | Facility: HOSPITAL | Age: 73
End: 2018-02-26

## 2018-02-26 DIAGNOSIS — M48.061 LUMBAR STENOSIS WITHOUT NEUROGENIC CLAUDICATION: ICD-10-CM

## 2018-02-26 DIAGNOSIS — R93.7 ABNORMAL X-RAY OF LUMBAR SPINE: ICD-10-CM

## 2018-02-26 PROCEDURE — 72100 X-RAY EXAM L-S SPINE 2/3 VWS: CPT | Mod: TC,FY,PO

## 2018-02-26 PROCEDURE — 72148 MRI LUMBAR SPINE W/O DYE: CPT | Mod: TC,PO

## 2018-02-27 ENCOUNTER — TELEPHONE (OUTPATIENT)
Dept: NEUROSURGERY | Facility: CLINIC | Age: 73
End: 2018-02-27

## 2018-02-27 NOTE — TELEPHONE ENCOUNTER
----- Message from Riley Diaz MD sent at 2/26/2018  6:06 PM CST -----  Pleas call back to let him know that I have reviewed his lumbar MRI. In addition of the L4-5 laminectomy/foraminotomy he will also need in the same setting a L2-3 laminectomy because the L2-3 stenosis have become worse.     We can sign the consent on the day of the surgery. We need to send an updated case request including the L2-3 laminectomy and medial facetectomy.

## 2018-02-27 NOTE — TELEPHONE ENCOUNTER
Will call back to discuss the results of the repeated lumbar MRI. Worsening stenosis at L2-3. Will need a L2-3 laminectomy, medial facetectomy and a bilateral L4-5 laminectomy and contralateral L4-5 microforaminotomy.

## 2018-02-27 NOTE — TELEPHONE ENCOUNTER
Spoke with patient and informed him of the stenosis worsening and having to include L2-3 in the laminectomy per Dr. Diaz. Verbalizes understanding.

## 2018-03-09 ENCOUNTER — ANESTHESIA EVENT (OUTPATIENT)
Dept: SURGERY | Facility: HOSPITAL | Age: 73
End: 2018-03-09
Payer: MEDICARE

## 2018-03-09 ENCOUNTER — HOSPITAL ENCOUNTER (OUTPATIENT)
Dept: PREADMISSION TESTING | Facility: HOSPITAL | Age: 73
Discharge: HOME | End: 2018-03-09
Attending: NEUROLOGICAL SURGERY
Payer: MEDICARE

## 2018-03-09 ENCOUNTER — HOSPITAL ENCOUNTER (OUTPATIENT)
Dept: CARDIOLOGY | Facility: HOSPITAL | Age: 73
Discharge: HOME | End: 2018-03-09
Attending: NEUROLOGICAL SURGERY
Payer: MEDICARE

## 2018-03-09 ENCOUNTER — OFFICE VISIT (OUTPATIENT)
Dept: CARDIOLOGY | Facility: CLINIC | Age: 73
End: 2018-03-09
Payer: MEDICARE

## 2018-03-09 VITALS
BODY MASS INDEX: 36.68 KG/M2 | OXYGEN SATURATION: 97 % | HEIGHT: 68 IN | HEART RATE: 71 BPM | WEIGHT: 242 LBS | TEMPERATURE: 98 F | SYSTOLIC BLOOD PRESSURE: 159 MMHG | DIASTOLIC BLOOD PRESSURE: 89 MMHG | RESPIRATION RATE: 18 BRPM

## 2018-03-09 VITALS
HEART RATE: 77 BPM | WEIGHT: 242 LBS | SYSTOLIC BLOOD PRESSURE: 159 MMHG | OXYGEN SATURATION: 95 % | HEIGHT: 68 IN | BODY MASS INDEX: 36.68 KG/M2 | DIASTOLIC BLOOD PRESSURE: 89 MMHG

## 2018-03-09 DIAGNOSIS — E78.2 MIXED HYPERLIPIDEMIA: ICD-10-CM

## 2018-03-09 DIAGNOSIS — I51.89 DIASTOLIC DYSFUNCTION WITHOUT HEART FAILURE: ICD-10-CM

## 2018-03-09 DIAGNOSIS — Z01.818 PRE-OP EVALUATION: Primary | ICD-10-CM

## 2018-03-09 DIAGNOSIS — Z01.818 PRE-OP TESTING: ICD-10-CM

## 2018-03-09 DIAGNOSIS — K22.6 MALLORY-WEISS TEAR: ICD-10-CM

## 2018-03-09 DIAGNOSIS — I10 HTN (HYPERTENSION), BENIGN: ICD-10-CM

## 2018-03-09 DIAGNOSIS — Z01.818 PRE-OP TESTING: Primary | ICD-10-CM

## 2018-03-09 DIAGNOSIS — I49.9 CARDIAC ARRHYTHMIA, UNSPECIFIED CARDIAC ARRHYTHMIA TYPE: ICD-10-CM

## 2018-03-09 PROCEDURE — 93005 ELECTROCARDIOGRAM TRACING: CPT | Mod: PO

## 2018-03-09 PROCEDURE — 99204 OFFICE O/P NEW MOD 45 MIN: CPT | Mod: S$GLB,,, | Performed by: INTERNAL MEDICINE

## 2018-03-09 PROCEDURE — 93010 ELECTROCARDIOGRAM REPORT: CPT | Mod: S$GLB,,, | Performed by: INTERNAL MEDICINE

## 2018-03-09 PROCEDURE — 3079F DIAST BP 80-89 MM HG: CPT | Mod: S$GLB,,, | Performed by: INTERNAL MEDICINE

## 2018-03-09 PROCEDURE — 3077F SYST BP >= 140 MM HG: CPT | Mod: S$GLB,,, | Performed by: INTERNAL MEDICINE

## 2018-03-09 PROCEDURE — 99999 PR PBB SHADOW E&M-EST. PATIENT-LVL III: CPT | Mod: PBBFAC,,, | Performed by: INTERNAL MEDICINE

## 2018-03-09 RX ORDER — LIDOCAINE HYDROCHLORIDE 10 MG/ML
1 INJECTION, SOLUTION EPIDURAL; INFILTRATION; INTRACAUDAL; PERINEURAL ONCE
Status: CANCELLED | OUTPATIENT
Start: 2018-03-09 | End: 2018-03-09

## 2018-03-09 RX ORDER — SODIUM CHLORIDE, SODIUM LACTATE, POTASSIUM CHLORIDE, CALCIUM CHLORIDE 600; 310; 30; 20 MG/100ML; MG/100ML; MG/100ML; MG/100ML
INJECTION, SOLUTION INTRAVENOUS CONTINUOUS
Status: CANCELLED | OUTPATIENT
Start: 2018-03-09

## 2018-03-09 NOTE — DISCHARGE INSTRUCTIONS
Your surgery is scheduled for 3/15.    Please report to Outpatient Surgery Intake Office on the 2nd FLOOR at 0630 a.m.          INSTRUCTIONS IMPORTANT!!!  ¨ Do not eat or drink after 12 midnight-including water. OK to brush teeth, no   gum, candy or mints!    ¨ Take only these medicines with a small swallow of water-morning of surgery.    pantoprazole    ____  Proceed to [facility] on *** for additional blood test.        ____  Do not wear makeup, including mascara.  ____  No powder, lotions or creams to surgical area.  ____  Please remove all jewelry, including piercings and leave at home.  ____  No money or valuables needed. Please leave at home.  ____  Please bring any documents given by your doctor.  ____  If going home the same day, arrange for a ride home. You will not be able to             drive if Anesthesia was used.  ____  Children under 18 years require a parent / guardian present the entire time             they are in surgery / recovery.  ____  Wear loose fitting clothing. Allow for dressings, bandages.  ____  Stop Aspirin, Ibuprofen, Motrin and Aleve at least 3-5 days before surgery, unless otherwise instructed by your doctor, or the nurse.   You MAY use Tylenol/acetaminophen until day of surgery.  ____  Wash the surgical area with Hibiclens the night before surgery, and again the             morning of surgery.  Be sure to rinse hibiclens off completely (if instructed by   nurse).  ____  If you take diabetic medication, do not take am of surgery unless instructed by Doctor.  ____  Call MD for temperature above 101 degrees.  ____ Stop taking any Fish Oil supplement or any Vitamins that contain Vitamin E at least 5 days prior to surgery.  ____ Do Not wear your contact lenses the day of your procedure.  You may wear your glasses.        I have read or had read and explained to me, and understand the above information.  Additional comments or instructions:  For additional questions call  [phone]      Pre-Op Bathing Instructions    Before surgery, you can play an important role in your own health.    Because skin is not sterile, we need to be sure that your skin is as free of germs as possible. By following the instructions below, you can reduce the number of germs on your skin before surgery.    IMPORTANT: You will need to shower with a special soap called Hibiclens*, available at any pharmacy.  If you are allergic to Chlorhexidine (the antiseptic in Hibiclens), use an antibacterial soap such as Dial Soap for your preoperative shower.  You will shower with Hibiclens both the night before your surgery and the morning of your surgery.  Do not use Hibiclens on the head, face or genitals to avoid injury to those areas.    STEP #1: THE NIGHT BEFORE YOUR SURGERY     1. Do not shave the area of your body where your surgery will be performed.  2. Shower and wash your hair and body as usual with your normal soap and shampoo.  3. Rinse your hair and body thoroughly after you shower to remove all soap residue.  4. With your hand, apply one packet of Hibiclens soap to the surgical site.   5. Wash the site gently for five (5) minutes. Do not scrub your skin too hard.   6. Do not wash with your regular soap after Hibiclens is used.  7. Rinse your body thoroughly.  8. Pat yourself dry with a clean, soft towel.  9. Do not use lotion, cream, or powder.  10. Wear clean clothes.    STEP #2: THE MORNING OF YOUR SURGERY     1. Repeat Step #1.    * Not to be used by people allergic to Chlorhexidine.        Laminectomy  Vertebrae are the bones that make up the spine. Laminectomy is a surgery that removes the part of the vertebra called the lamina. This takes pressure off nerves in the low back and helps reduce symptoms. A similar surgery is called a laminotomy. For a laminotomy, only part of the lamina is removed.     The entire lamina is removed from the affected vertebra.   Before your surgery  Be sure to follow all of  your doctor's instructions on preparing for surgery.  · Follow any directions you are given for not eating or drinking before surgery.  · If you take a daily medicine, ask if you should still take it the morning of surgery.  · If you take any blood-thinning medicines, such as aspirin, discuss them with your doctor at least a week before surgery.  · At the hospital, your temperature, pulse, breathing, and blood pressure will be checked.  · An IV or intravenous line may be started to provide fluids and medicines needed during surgery.  During your surgery  · Once in the operating room, you will be given anesthesia.  · After you are asleep, an incision is made near the center of your low back. The incision may be 2 to 6 inches long, depending on how many vertebrae are involved.  · During a laminectomy, the lamina, or bone that forms the back of the spinal canal, is removed from the affected vertebra. The opening created may be enough to take pressure off the nerve. If needed, your doctor can also remove any bone spurs or disk matter pressing on the nerve. After laminectomy, the opening in the spine is protected by the thick back muscles.  · Once the nerve is free of pressure, the incision is closed with stitches or surgical staples.  After your surgery  After surgery, youll be sent to the PACU or postanesthesia care unit. When you are fully awake, youll be moved to your room. The nurses will give you medicines to ease your pain. You may have a small tube called a catheter in your bladder. Soon, healthcare providers will help you get up and moving. Youll also be shown how to keep your lungs clear.  When to call your healthcare provider  Once at home, call your provider if you have any of the symptoms below:  · Unusual redness, heat, or drainage at the incision site  · Increasing pain, numbness, or weakness in your leg  · Fever over 100.4°F (38°C)   Date Last Reviewed: 2/26/2016  © 2785-1878 [employer]  [address] [place] [address]. All rights reserved. This information is not intended as a substitute for professional medical care. Always follow your healthcare professional's instructions.        Anesthesia: General Anesthesia     You are watched continuously during your procedure by your anesthesia provider.     Youre due to have surgery. During surgery, youll be given medicine called anesthesia or anesthetic. This will keep you comfortable and pain-free. Your anesthesia provider will use general anesthesia.  What is general anesthesia?  General anesthesia puts you into a state like deep sleep. It goes into the bloodstream (IV anesthetics), into the lungs (gas anesthetics), or both. You feel nothing during the procedure. You will not remember it. During the procedure, the anesthesia provider monitors you continuously. He or she checks your heart rate and rhythm, blood pressure, breathing, and blood oxygen.  · IV anesthetics. IV anesthetics are given through an IV line in your arm. Theyre often given first. This is so you are asleep before a gas anesthetic is started. Some kinds of IV anesthetics relieve pain. Others relax you. Your doctor will decide which kind is best in your case.  · Gas anesthetics. Gas anesthetics are breathed into the lungs. They are often used to keep you asleep. They can be given through a facemask or a tube placed in your larynx or trachea (breathing tube).  ¨ If you have a facemask, your anesthesia provider will most likely place it over your nose and mouth while youre still awake. Youll breathe oxygen through the mask as your IV anesthetic is started. Gas anesthetic may be added through the mask.  ¨ If you have a tube in the larynx or trachea, it will be inserted into your throat after youre asleep.  Anesthesia tools and medicines  You will likely have:  · IV anesthetics. These are put into an IV line into your bloodstream.  · Gas anesthetics. You breathe  these anesthetics into your lungs, where they pass into your bloodstream.  · Pulse oximeter. This is a small clip that is attached to the end of your finger. This measures your blood oxygen level.  · Electrocardiography leads (electrodes). These are small sticky pads that are placed on your chest. They record your heart rate and rhythm.  · Blood pressure cuff. This reads your blood pressure.  Risks and possible complications  General anesthesia has some risks. These include:  · Breathing problems  · Nausea and vomiting  · Sore throat or hoarseness (usually temporary)  · Allergic reaction to the anesthetic  · Irregular heartbeat (rare)  · Cardiac arrest (rare)   Anesthesia safety  · Follow all instructions you are given for how long not to eat or drink before your procedure.  · Be sure your doctor knows what medicines and drugs you take. This includes over-the-counter medicines, herbs, supplements, alcohol or other drugs. You will be asked when those were last taken.  · Have an adult family member or friend drive you home after the procedure.  · For the first 24 hours after your surgery:  ¨ Do not drive or use heavy equipment.  ¨ Do not make important decisions or sign legal documents. If important decisions or signing legal documents is necessary during the first 24 hours after surgery, have a trusted family member or spouse act on your behalf.  ¨ Avoid alcohol.  ¨ Have a responsible adult stay with you. He or she can watch for problems and help keep you safe.  Date Last Reviewed: 12/1/2016  © 4539-3993 [employer]. [address] [place] [address], [place] [address]. All rights reserved. This information is not intended as a substitute for professional medical care. Always follow your healthcare professional's instructions.

## 2018-03-09 NOTE — PROGRESS NOTES
"Subjective:   Patient ID:  Philip Kam Sr. is a 72 y.o. male who presents for evaluation of Pre-op Exam      HPI: 73 y/o male with PMH of DM, HLD and HTN present to establish care to get pre op evaluation before lumbar spine surgery secondary to nerve compression. Patient is not very active secondary to pain. BP is mildly elevated but he has not been taking medications recently. As per patient BP is usually lower after he recently lost 50 lbs. He denies chest pain or dyspnea. No orthopnea or PND. Echo showed normal ef with DD. He does not smoke. No history of MI or CVA. ECG from PCPs office showed atrial fib with rate controled.     Past Medical History:   Diagnosis Date    Anxiety     Diabetes mellitus     High cholesterol     Hypertension     Thyroid disease        Past Surgical History:   Procedure Laterality Date    COLONOSCOPY      Eshophagus Tear      EYE SURGERY      TONSILLECTOMY         Social History   Substance Use Topics    Smoking status: Never Smoker    Smokeless tobacco: Never Used    Alcohol use 0.6 oz/week     1 Cans of beer per week      Comment: Everyother day_Pt stated,"I drink a few beers."       Family History   Problem Relation Age of Onset    Heart attack Son        Patient's Medications   New Prescriptions    No medications on file   Previous Medications    ALLOPURINOL (ZYLOPRIM) 300 MG TABLET    Take 300 mg by mouth once daily.     CLONAZEPAM (KLONOPIN) 0.5 MG TABLET    Take 0.5 mg by mouth as needed for Anxiety. Once    FUROSEMIDE (LASIX) 40 MG TABLET    40 mg once daily.     GABAPENTIN (NEURONTIN) 300 MG CAPSULE    Take 1 capsule (300 mg total) by mouth 3 (three) times daily. Take 1 cap QHS x 3 days. Increase by 1 cap every 3 days until taking TID.    HYDROCODONE-ACETAMINOPHEN 5-325MG (NORCO) 5-325 MG PER TABLET    Take 1 tablet by mouth every 8 (eight) hours as needed for Pain.    LEVOCETIRIZINE (XYZAL) 5 MG TABLET    Take 5 mg by mouth as needed.     LEVOTHYROXINE " (SYNTHROID) 100 MCG TABLET    Take 100 mcg by mouth once daily.     LISINOPRIL-HYDROCHLOROTHIAZIDE (PRINZIDE,ZESTORETIC) 20-12.5 MG PER TABLET    Take 1 tablet by mouth once daily.    METFORMIN (GLUCOPHAGE) 500 MG TABLET    Take 500 mg by mouth 2 (two) times daily with meals.    MULTIVITAMIN (ONE DAILY MULTIVITAMIN) PER TABLET    Take 1 tablet by mouth once daily.    PANTOPRAZOLE (PROTONIX) 40 MG TABLET    Take 40 mg by mouth once daily.     SIMVASTATIN (ZOCOR) 40 MG TABLET    Take 40 mg by mouth every evening.    SITAGLIPTIN (JANUVIA) 100 MG TAB    Take 100 mg by mouth once daily.   Modified Medications    No medications on file   Discontinued Medications    No medications on file        Review of patient's allergies indicates:   Allergen Reactions    Codeine Itching       Review of Systems   Constitution: Negative.   HENT: Negative.    Eyes: Negative.    Cardiovascular: Negative.    Respiratory: Negative.    Endocrine: Negative.    Skin: Negative.    Musculoskeletal: Negative.    Gastrointestinal: Negative.    Neurological: Negative.    Psychiatric/Behavioral: Negative.    Allergic/Immunologic: Negative.      Objective:   Physical Exam   Constitutional: He is oriented to person, place, and time. He appears well-developed and well-nourished. No distress.   Examination of the digits showed no clubbing or cyanosis   HENT:   Head: Normocephalic and atraumatic.   Eyes: Conjunctivae are normal. Pupils are equal, round, and reactive to light. Right eye exhibits no discharge.   Neck: Normal range of motion. Neck supple. No JVD present. No thyromegaly present.   No carotid bruits   Cardiovascular: Normal rate, S1 normal, S2 normal, normal heart sounds, intact distal pulses and normal pulses.  An irregularly irregular rhythm present. PMI is not displaced.  Exam reveals no gallop, no friction rub and no opening snap.    No murmur heard.  Pulmonary/Chest: Effort normal and breath sounds normal. No respiratory distress. He  has no wheezes. He has no rales. He exhibits no tenderness.   Abdominal: Soft. Bowel sounds are normal. He exhibits no distension and no mass. There is no tenderness. There is no guarding.   No hepatosplenomegaly   Musculoskeletal: Normal range of motion. He exhibits no edema or tenderness.   Lymphadenopathy:     He has no cervical adenopathy.   Neurological: He is alert and oriented to person, place, and time.   Skin: Skin is warm. No rash noted. He is not diaphoretic. No erythema.   Psychiatric: He has a normal mood and affect.   Nursing note and vitals reviewed.      Lab Results   Component Value Date    WBC 5.34 08/16/2013    HGB 13.3 (L) 08/16/2013    HCT 37.3 (L) 08/16/2013    MCV 96.9 (H) 08/16/2013     08/16/2013         Chemistry        Component Value Date/Time     08/16/2013 1117    K 4.1 08/16/2013 1117     08/16/2013 1117    CO2 23 08/16/2013 1117    BUN 10 08/16/2013 1117    CREATININE 0.9 08/16/2013 1117     (H) 08/16/2013 1117        Component Value Date/Time    CALCIUM 9.4 08/16/2013 1117    ALKPHOS 73 08/16/2013 1117    AST 17 08/16/2013 1117    ALT 18 08/16/2013 1117    BILITOT 2.3 (H) 08/16/2013 1117    ESTGFRAFRICA >60 08/16/2013 1117    EGFRNONAA >60 08/16/2013 1117            Lab Results   Component Value Date    CHOL 152 05/05/2011     Lab Results   Component Value Date    HDL 55 05/05/2011     Lab Results   Component Value Date    LDLCALC 72.0 05/05/2011     Lab Results   Component Value Date    TRIG 125 05/05/2011     Lab Results   Component Value Date    CHOLHDL 36.2 05/05/2011       Lab Results   Component Value Date    TSH 2.37 08/16/2013       Lab Results   Component Value Date    HGBA1C 5.8 05/05/2011       ECGs reviewed- ? Atrial fib, multiple artifacts. Will repeat on f/u  LABS reviewed  Imaging including Echoes reviewed- normal ef with DD    Assessment:     1. Pre-op evaluation    2. HTN (hypertension), benign    3. Mixed hyperlipidemia    4. Diastolic  dysfunction without heart failure    5. BMI 36.0-36.9,adult    6. Cardiac arrhythmia, unspecified cardiac arrhythmia type        Plan:     Patient is moderate risk for intermediate risk lumbar surgery. Mets<4. No contraindications to surgery such as tachyarrhythmias, severe obstructive valve disease, decompensated CHF or recent MI. Risk Factors include DM. No CRF, CVA, MI or CHF. Risk <0.9%    Arrhthyhmia likely Afib. Will repeat ECG on f/u.   Activity as tolerate  Weight loss  F/u in 4 weeks    Clinic time spent with patient discussing and treating medical condition including reviewing images, ECG, labs and medications > 40 minutes.

## 2018-03-09 NOTE — ANESTHESIA PREPROCEDURE EVALUATION
03/09/2018  Philip Kam Sr. is a 72 y.o., male is scheduled for  L2-3,L4-5 Contralateral Medial Facetectomy Laminectomy and Foraminotomy/ (Bilateral) under GETA on 3/15/2018.    Cardiology H&P and clearance with moderate risk stratification in EPIC on 3/09/2018.    Outside PCP H&P and clearance in media section pending update after cardiology visit.    Past Surgical History:   Procedure Laterality Date    COLONOSCOPY      ESOPHAGUS SURGERY  2012    EYE SURGERY      LUMBAR EPIDURAL INJECTION  01/2018    x2    TONSILLECTOMY       Past Medical History:   Diagnosis Date    Anxiety     High cholesterol     Hypertension     Hypothyroidism     Type 2 diabetes mellitus, uncontrolled        Anesthesia Evaluation    I have reviewed the Patient Summary Reports.    I have reviewed the Nursing Notes.   I have reviewed the Medications.   Steroids Taken In Past Year: Cortisone    Review of Systems  Anesthesia Hx:  No problems with previous Anesthesia  History of prior surgery of interest to airway management or planning: Previous anesthesia: General, Nerve Block, MAC  Procedure performed at an [facility].   Procedure performed at an [facility].  Denies Personal Hx of Anesthesia complications.   Social:  Non-Smoker, Alcohol Use    Hematology/Oncology:  Hematology Normal        EENT/Dental:   chronic allergic rhinitis   Cardiovascular:   Hypertension, well controlled Dysrhythmias (recently seen on outside EKG 3/06/2018 and with no prior hx of AF; cards cleared 3/09/2018 and will get new ekg after visit today) atrial fibrillation Denies Angina. CHF hyperlipidemia     ECG has been reviewed.  Functional Capacity low / < 4 METS, limited by back pain recently    Pulmonary:   Denies Shortness of breath.    Renal/:  Renal/ Normal     Hepatic/GI:   GERD, well controlled    Musculoskeletal:    Arthritis  Difficulty ambulating and uses rolling walker or wheelchair due to BLE weakness Spine Disorders: lumbar Disc disease and Degenerative disease    Neurological:  Neurology Normal    Endocrine:   Diabetes, type 2 Hypothyroidism  Diabetes , controlled by oral hypoglycemics.  Does not recall last value; states was elevated with recent lumbar steroid injections, but normally well controlled.    Psych:   anxiety        Lab Results   Component Value Date    WBC 5.34 08/16/2013    HGB 13.3 (L) 08/16/2013    HCT 37.3 (L) 08/16/2013    MCV 96.9 (H) 08/16/2013     08/16/2013       Chemistry        Component Value Date/Time     08/16/2013 1117    K 4.1 08/16/2013 1117     08/16/2013 1117    CO2 23 08/16/2013 1117    BUN 10 08/16/2013 1117    CREATININE 0.9 08/16/2013 1117     (H) 08/16/2013 1117        Component Value Date/Time    CALCIUM 9.4 08/16/2013 1117    ALKPHOS 73 08/16/2013 1117    AST 17 08/16/2013 1117    ALT 18 08/16/2013 1117    BILITOT 2.3 (H) 08/16/2013 1117    ESTGFRAFRICA >60 08/16/2013 1117    EGFRNONAA >60 08/16/2013 1117            Physical Exam  General:  Obesity, Large Beard    Airway/Jaw/Neck:  Airway Findings: Mouth Opening: Normal Tongue: Normal  General Airway Assessment: Adult  Mallampati: II  TM Distance: Normal, at least 6 cm  Jaw/Neck Findings:  Neck ROM: Extension Decreased, Mild  Neck Findings:  Girth Increased     Eyes/Ears/Nose:  EYES/EARS/NOSE FINDINGS: Normal   Dental:  Dental Findings: In tact, Periodontal disease, Mild   Chest/Lungs:  Chest/Lungs Clear    Heart/Vascular:  Heart Findings: Rate: Normal  Rhythm: Irregularly Irregular  Sounds: Normal  Heart murmur: negative    Abdomen:  Abdomen Findings: Normal    Musculoskeletal:  Musculoskeletal Findings: (lumbar spine) Tender Joint     Mental Status:  Mental Status Findings: Normal      2D Echo w/CFD 11/28/2017  CONCLUSIONS     1 - Normal left ventricular systolic function (EF 55-60%).     2 - Impaired LV  relaxation, normal LAP (grade 1 diastolic dysfunction).     3 - Concentric hypertrophy.     4 - Normal right ventricular systolic function .     5 - The estimated PA systolic pressure is 17 mmHg.   This document has been electronically    SIGNED BY: Murray Schmidt MD On: 11/28/2017 10:57    Anesthesia Plan  Type of Anesthesia, risks & benefits discussed:  Anesthesia Type:  general  Patient's Preference:   Intra-op Monitoring Plan:   Intra-op Monitoring Plan Comments:   Post Op Pain Control Plan:   Post Op Pain Control Plan Comments:   Induction:   IV  Beta Blocker:  Patient is not currently on a Beta-Blocker (No further documentation required).       Informed Consent: Patient understands risks and agrees with Anesthesia plan.  Questions answered.   ASA Score: 3     Day of Surgery Review of History & Physical: I have interviewed and examined the patient. I have reviewed the patient's H&P dated:  There are no significant changes.  H&P update referred to the provider.  H&P completed by Anesthesiologist.   Anesthesia Plan Notes: Anesthesia consent will be obtained prior to procedure on 3/15/2018.    Cardiology H&P and clearance with moderate risk stratification in [facility] on 3/09/2018.  Outside PCP with labs and EKG with atrial fibrillation reviewed and in media section; clearance pending update after cardiology visit.          Ready For Surgery From Anesthesia Perspective.

## 2018-03-09 NOTE — PROGRESS NOTES
Patient, Philip Kam Gudelia (MRN #[id number]), presented with a recorded BMI of 36.8 kg/m^2 and a documented comorbidity(s):  - Hypertension  - Hyperlipidemia  to which the severe obesity is a contributing factor. This is consistent with the definition of severe obesity (BMI 35.0-35.9) with comorbidity (ICD-10 E66.01, Z68.35). The patient's severe obesity was monitored, evaluated, addressed and/or treated. This addendum to the medical record is made on 03/09/2018.

## 2018-03-09 NOTE — LETTER
March 9, 2018        Riley Diaz MD  [address]  [address]  [facility] [address]             [facility]  [address]  [facility] [address]  Phone: [phone]   Patient: Philip Kam Sr.   MR Number: [id number]   YOB: 1945   Date of Visit: 3/9/2018       Dear Dr. Diaz:    Thank you for referring Philip Kam to me for evaluation. Below are the relevant portions of my assessment and plan of care.       ECGs reviewed- ? Atrial fib, multiple artifacts. Will repeat on f/u  LABS reviewed  Imaging including Echoes reviewed- normal ef with DD     Assessment:      1. Pre-op evaluation    2. HTN (hypertension), benign    3. Mixed hyperlipidemia    4. Diastolic dysfunction without heart failure    5. BMI 36.0-36.9,adult    6. Cardiac arrhythmia, unspecified cardiac arrhythmia type          Plan:      Patient is moderate risk for intermediate risk lumbar surgery. Mets<4. No contraindications to surgery such as tachyarrhythmias, severe obstructive valve disease, decompensated CHF or recent MI. Risk Factors include DM. No CRF, CVA, MI or CHF. Risk <0.9%     Arrhthyhmia likely Afib. Will repeat ECG on f/u.   Activity as tolerate  Weight loss                 If you have questions, please do not hesitate to call me. I look forward to following hPilip along with you.    Sincerely,      Murray Schmidt MD           CC  Riley Diaz MD

## 2018-03-15 ENCOUNTER — SURGERY (OUTPATIENT)
Age: 73
End: 2018-03-15

## 2018-03-15 ENCOUNTER — HOSPITAL ENCOUNTER (OUTPATIENT)
Facility: HOSPITAL | Age: 73
Discharge: HOME | End: 2018-03-16
Attending: NEUROLOGICAL SURGERY | Admitting: NEUROLOGICAL SURGERY
Payer: MEDICARE

## 2018-03-15 ENCOUNTER — ANESTHESIA (OUTPATIENT)
Dept: PREADMISSION TESTING | Facility: HOSPITAL | Age: 73
End: 2018-03-15
Payer: MEDICARE

## 2018-03-15 DIAGNOSIS — N35.9 URETHRAL STRICTURE, UNSPECIFIED STRICTURE TYPE: ICD-10-CM

## 2018-03-15 DIAGNOSIS — M54.16 LUMBAR RADICULOPATHY: Primary | ICD-10-CM

## 2018-03-15 PROBLEM — T83.9XXA FOLEY CATHETER PROBLEM: Status: ACTIVE | Noted: 2018-03-15

## 2018-03-15 LAB
ABO + RH BLD: NORMAL
BLD GP AB SCN CELLS X3 SERPL QL: NORMAL
ESTIMATED AVG GLUCOSE: 148 MG/DL
HBA1C MFR BLD HPLC: 6.8 %
POCT GLUCOSE: 187 MG/DL (ref 70–110)
POCT GLUCOSE: 239 MG/DL (ref 70–110)
POCT GLUCOSE: 273 MG/DL (ref 70–110)

## 2018-03-15 PROCEDURE — 63047 LAM FACETEC & FORAMOT LUMBAR: CPT | Mod: AS,,, | Performed by: PHYSICIAN ASSISTANT

## 2018-03-15 PROCEDURE — 83036 HEMOGLOBIN GLYCOSYLATED A1C: CPT

## 2018-03-15 PROCEDURE — 63600175 PHARM REV CODE 636 W HCPCS: Performed by: PHYSICIAN ASSISTANT

## 2018-03-15 PROCEDURE — 36415 COLL VENOUS BLD VENIPUNCTURE: CPT

## 2018-03-15 PROCEDURE — 25000003 PHARM REV CODE 250: Performed by: NEUROLOGICAL SURGERY

## 2018-03-15 PROCEDURE — 63600175 PHARM REV CODE 636 W HCPCS: Performed by: NURSE ANESTHETIST, CERTIFIED REGISTERED

## 2018-03-15 PROCEDURE — 63048 LAM FACETEC &FORAMOT EA ADDL: CPT | Mod: ,,, | Performed by: NEUROLOGICAL SURGERY

## 2018-03-15 PROCEDURE — 27201423 OPTIME MED/SURG SUP & DEVICES STERILE SUPPLY: Performed by: NEUROLOGICAL SURGERY

## 2018-03-15 PROCEDURE — 71000033 HC RECOVERY, INTIAL HOUR: Performed by: NEUROLOGICAL SURGERY

## 2018-03-15 PROCEDURE — 63048 LAM FACETEC &FORAMOT EA ADDL: CPT | Mod: AS,,, | Performed by: PHYSICIAN ASSISTANT

## 2018-03-15 PROCEDURE — 63047 LAM FACETEC & FORAMOT LUMBAR: CPT | Mod: 22,,, | Performed by: NEUROLOGICAL SURGERY

## 2018-03-15 PROCEDURE — 86901 BLOOD TYPING SEROLOGIC RH(D): CPT

## 2018-03-15 PROCEDURE — 36000710: Performed by: NEUROLOGICAL SURGERY

## 2018-03-15 PROCEDURE — 25000003 PHARM REV CODE 250: Performed by: PHYSICIAN ASSISTANT

## 2018-03-15 PROCEDURE — 37000009 HC ANESTHESIA EA ADD 15 MINS: Performed by: NEUROLOGICAL SURGERY

## 2018-03-15 PROCEDURE — 25000003 PHARM REV CODE 250: Performed by: NURSE ANESTHETIST, CERTIFIED REGISTERED

## 2018-03-15 PROCEDURE — 63600175 PHARM REV CODE 636 W HCPCS: Performed by: NEUROLOGICAL SURGERY

## 2018-03-15 PROCEDURE — 37000008 HC ANESTHESIA 1ST 15 MINUTES: Performed by: NEUROLOGICAL SURGERY

## 2018-03-15 PROCEDURE — 71000039 HC RECOVERY, EACH ADD'L HOUR: Performed by: NEUROLOGICAL SURGERY

## 2018-03-15 PROCEDURE — 25000003 PHARM REV CODE 250: Performed by: NURSE PRACTITIONER

## 2018-03-15 PROCEDURE — 36000711: Performed by: NEUROLOGICAL SURGERY

## 2018-03-15 RX ORDER — ONDANSETRON 2 MG/ML
4 INJECTION INTRAMUSCULAR; INTRAVENOUS DAILY PRN
Status: DISCONTINUED | OUTPATIENT
Start: 2018-03-15 | End: 2018-03-15 | Stop reason: HOSPADM

## 2018-03-15 RX ORDER — OXYCODONE HYDROCHLORIDE 5 MG/1
5 TABLET ORAL EVERY 4 HOURS PRN
Status: DISCONTINUED | OUTPATIENT
Start: 2018-03-15 | End: 2018-03-16 | Stop reason: HOSPADM

## 2018-03-15 RX ORDER — DEXAMETHASONE SODIUM PHOSPHATE 4 MG/ML
INJECTION, SOLUTION INTRA-ARTICULAR; INTRALESIONAL; INTRAMUSCULAR; INTRAVENOUS; SOFT TISSUE
Status: DISCONTINUED | OUTPATIENT
Start: 2018-03-15 | End: 2018-03-15

## 2018-03-15 RX ORDER — SODIUM CHLORIDE 0.9 % (FLUSH) 0.9 %
3 SYRINGE (ML) INJECTION
Status: DISCONTINUED | OUTPATIENT
Start: 2018-03-15 | End: 2018-03-15 | Stop reason: HOSPADM

## 2018-03-15 RX ORDER — SUCCINYLCHOLINE CHLORIDE 20 MG/ML
INJECTION INTRAMUSCULAR; INTRAVENOUS
Status: DISCONTINUED | OUTPATIENT
Start: 2018-03-15 | End: 2018-03-15

## 2018-03-15 RX ORDER — DIPHENHYDRAMINE HYDROCHLORIDE 50 MG/ML
25 INJECTION INTRAMUSCULAR; INTRAVENOUS EVERY 6 HOURS PRN
Status: DISCONTINUED | OUTPATIENT
Start: 2018-03-15 | End: 2018-03-15 | Stop reason: HOSPADM

## 2018-03-15 RX ORDER — CYCLOBENZAPRINE HCL 5 MG
5 TABLET ORAL 3 TIMES DAILY PRN
Status: DISCONTINUED | OUTPATIENT
Start: 2018-03-15 | End: 2018-03-16 | Stop reason: HOSPADM

## 2018-03-15 RX ORDER — ALLOPURINOL 100 MG/1
300 TABLET ORAL DAILY
Status: DISCONTINUED | OUTPATIENT
Start: 2018-03-16 | End: 2018-03-16 | Stop reason: HOSPADM

## 2018-03-15 RX ORDER — FUROSEMIDE 40 MG/1
40 TABLET ORAL DAILY
Status: DISCONTINUED | OUTPATIENT
Start: 2018-03-16 | End: 2018-03-16 | Stop reason: HOSPADM

## 2018-03-15 RX ORDER — GLUCAGON 1 MG
1 KIT INJECTION
Status: DISCONTINUED | OUTPATIENT
Start: 2018-03-15 | End: 2018-03-16 | Stop reason: HOSPADM

## 2018-03-15 RX ORDER — LIDOCAINE HCL/PF 100 MG/5ML
SYRINGE (ML) INTRAVENOUS
Status: DISCONTINUED | OUTPATIENT
Start: 2018-03-15 | End: 2018-03-15

## 2018-03-15 RX ORDER — PREGABALIN 25 MG/1
25 CAPSULE ORAL
Status: COMPLETED | OUTPATIENT
Start: 2018-03-15 | End: 2018-03-15

## 2018-03-15 RX ORDER — FENTANYL CITRATE 50 UG/ML
25 INJECTION, SOLUTION INTRAMUSCULAR; INTRAVENOUS
Status: DISCONTINUED | OUTPATIENT
Start: 2018-03-15 | End: 2018-03-15 | Stop reason: HOSPADM

## 2018-03-15 RX ORDER — IBUPROFEN 200 MG
16 TABLET ORAL
Status: DISCONTINUED | OUTPATIENT
Start: 2018-03-15 | End: 2018-03-16 | Stop reason: HOSPADM

## 2018-03-15 RX ORDER — ACETAMINOPHEN 500 MG
1000 TABLET ORAL EVERY 8 HOURS
Status: DISCONTINUED | OUTPATIENT
Start: 2018-03-15 | End: 2018-03-16 | Stop reason: HOSPADM

## 2018-03-15 RX ORDER — ROCURONIUM BROMIDE 10 MG/ML
INJECTION, SOLUTION INTRAVENOUS
Status: DISCONTINUED | OUTPATIENT
Start: 2018-03-15 | End: 2018-03-15

## 2018-03-15 RX ORDER — ACETAMINOPHEN 10 MG/ML
1000 INJECTION, SOLUTION INTRAVENOUS
Status: COMPLETED | OUTPATIENT
Start: 2018-03-15 | End: 2018-03-15

## 2018-03-15 RX ORDER — PHENYLEPHRINE HYDROCHLORIDE 10 MG/ML
INJECTION INTRAVENOUS
Status: DISCONTINUED | OUTPATIENT
Start: 2018-03-15 | End: 2018-03-15

## 2018-03-15 RX ORDER — MUPIROCIN 20 MG/G
1 OINTMENT TOPICAL
Status: COMPLETED | OUTPATIENT
Start: 2018-03-15 | End: 2018-03-15

## 2018-03-15 RX ORDER — LEVOTHYROXINE SODIUM 100 UG/1
100 TABLET ORAL
Status: DISCONTINUED | OUTPATIENT
Start: 2018-03-16 | End: 2018-03-16 | Stop reason: HOSPADM

## 2018-03-15 RX ORDER — BACITRACIN 50000 [IU]/1
INJECTION, POWDER, FOR SOLUTION INTRAMUSCULAR
Status: DISCONTINUED | OUTPATIENT
Start: 2018-03-15 | End: 2018-03-15 | Stop reason: HOSPADM

## 2018-03-15 RX ORDER — TRAMADOL HYDROCHLORIDE 50 MG/1
50 TABLET ORAL EVERY 6 HOURS
Status: DISCONTINUED | OUTPATIENT
Start: 2018-03-15 | End: 2018-03-16 | Stop reason: HOSPADM

## 2018-03-15 RX ORDER — INSULIN ASPART 100 [IU]/ML
0-5 INJECTION, SOLUTION INTRAVENOUS; SUBCUTANEOUS
Status: DISCONTINUED | OUTPATIENT
Start: 2018-03-15 | End: 2018-03-16 | Stop reason: HOSPADM

## 2018-03-15 RX ORDER — GABAPENTIN 300 MG/1
300 CAPSULE ORAL 3 TIMES DAILY
Status: DISCONTINUED | OUTPATIENT
Start: 2018-03-15 | End: 2018-03-16 | Stop reason: HOSPADM

## 2018-03-15 RX ORDER — FENTANYL CITRATE 50 UG/ML
INJECTION, SOLUTION INTRAMUSCULAR; INTRAVENOUS
Status: DISCONTINUED | OUTPATIENT
Start: 2018-03-15 | End: 2018-03-15

## 2018-03-15 RX ORDER — CELECOXIB 100 MG/1
100 CAPSULE ORAL 2 TIMES DAILY
Status: DISCONTINUED | OUTPATIENT
Start: 2018-03-15 | End: 2018-03-16 | Stop reason: HOSPADM

## 2018-03-15 RX ORDER — PROPOFOL 10 MG/ML
VIAL (ML) INTRAVENOUS
Status: DISCONTINUED | OUTPATIENT
Start: 2018-03-15 | End: 2018-03-15

## 2018-03-15 RX ORDER — ONDANSETRON 8 MG/1
8 TABLET, ORALLY DISINTEGRATING ORAL EVERY 6 HOURS PRN
Status: DISCONTINUED | OUTPATIENT
Start: 2018-03-15 | End: 2018-03-16 | Stop reason: HOSPADM

## 2018-03-15 RX ORDER — CEFAZOLIN SODIUM 2 G/50ML
2 SOLUTION INTRAVENOUS
Status: COMPLETED | OUTPATIENT
Start: 2018-03-15 | End: 2018-03-15

## 2018-03-15 RX ORDER — ONDANSETRON 2 MG/ML
INJECTION INTRAMUSCULAR; INTRAVENOUS
Status: DISCONTINUED | OUTPATIENT
Start: 2018-03-15 | End: 2018-03-15

## 2018-03-15 RX ORDER — CELECOXIB 100 MG/1
100 CAPSULE ORAL
Status: COMPLETED | OUTPATIENT
Start: 2018-03-15 | End: 2018-03-15

## 2018-03-15 RX ORDER — LIDOCAINE HYDROCHLORIDE 10 MG/ML
1 INJECTION, SOLUTION EPIDURAL; INFILTRATION; INTRACAUDAL; PERINEURAL ONCE
Status: DISCONTINUED | OUTPATIENT
Start: 2018-03-15 | End: 2018-03-15 | Stop reason: HOSPADM

## 2018-03-15 RX ORDER — PANTOPRAZOLE SODIUM 40 MG/1
40 TABLET, DELAYED RELEASE ORAL DAILY
Status: DISCONTINUED | OUTPATIENT
Start: 2018-03-16 | End: 2018-03-16 | Stop reason: HOSPADM

## 2018-03-15 RX ORDER — CYCLOBENZAPRINE HCL 5 MG
5 TABLET ORAL
Status: COMPLETED | OUTPATIENT
Start: 2018-03-15 | End: 2018-03-15

## 2018-03-15 RX ORDER — DIPHENHYDRAMINE HYDROCHLORIDE 50 MG/ML
25 INJECTION INTRAMUSCULAR; INTRAVENOUS EVERY 6 HOURS PRN
Status: DISCONTINUED | OUTPATIENT
Start: 2018-03-15 | End: 2018-03-15

## 2018-03-15 RX ORDER — IBUPROFEN 200 MG
24 TABLET ORAL
Status: DISCONTINUED | OUTPATIENT
Start: 2018-03-15 | End: 2018-03-16 | Stop reason: HOSPADM

## 2018-03-15 RX ORDER — OXYCODONE HCL 10 MG/1
10 TABLET, FILM COATED, EXTENDED RELEASE ORAL
Status: COMPLETED | OUTPATIENT
Start: 2018-03-15 | End: 2018-03-15

## 2018-03-15 RX ORDER — BUPIVACAINE HYDROCHLORIDE 2.5 MG/ML
INJECTION, SOLUTION EPIDURAL; INFILTRATION; INTRACAUDAL
Status: DISCONTINUED | OUTPATIENT
Start: 2018-03-15 | End: 2018-03-15 | Stop reason: HOSPADM

## 2018-03-15 RX ORDER — VANCOMYCIN HYDROCHLORIDE 1 G/20ML
INJECTION, POWDER, LYOPHILIZED, FOR SOLUTION INTRAVENOUS
Status: DISCONTINUED | OUTPATIENT
Start: 2018-03-15 | End: 2018-03-15 | Stop reason: HOSPADM

## 2018-03-15 RX ORDER — SODIUM CHLORIDE, SODIUM LACTATE, POTASSIUM CHLORIDE, CALCIUM CHLORIDE 600; 310; 30; 20 MG/100ML; MG/100ML; MG/100ML; MG/100ML
INJECTION, SOLUTION INTRAVENOUS CONTINUOUS
Status: DISCONTINUED | OUTPATIENT
Start: 2018-03-15 | End: 2018-03-15

## 2018-03-15 RX ORDER — AMOXICILLIN 250 MG
2 CAPSULE ORAL NIGHTLY PRN
Status: DISCONTINUED | OUTPATIENT
Start: 2018-03-15 | End: 2018-03-16 | Stop reason: HOSPADM

## 2018-03-15 RX ORDER — SODIUM CHLORIDE AND POTASSIUM CHLORIDE 150; 900 MG/100ML; MG/100ML
INJECTION, SOLUTION INTRAVENOUS CONTINUOUS
Status: DISCONTINUED | OUTPATIENT
Start: 2018-03-15 | End: 2018-03-16

## 2018-03-15 RX ORDER — SIMVASTATIN 40 MG/1
40 TABLET, FILM COATED ORAL NIGHTLY
Status: DISCONTINUED | OUTPATIENT
Start: 2018-03-15 | End: 2018-03-16 | Stop reason: HOSPADM

## 2018-03-15 RX ORDER — MAG HYDROX/ALUMINUM HYD/SIMETH 200-200-20
30 SUSPENSION, ORAL (FINAL DOSE FORM) ORAL EVERY 4 HOURS PRN
Status: DISCONTINUED | OUTPATIENT
Start: 2018-03-15 | End: 2018-03-16 | Stop reason: HOSPADM

## 2018-03-15 RX ORDER — CLONAZEPAM 0.5 MG/1
0.5 TABLET ORAL DAILY
Status: DISCONTINUED | OUTPATIENT
Start: 2018-03-16 | End: 2018-03-16 | Stop reason: HOSPADM

## 2018-03-15 RX ADMIN — EPHEDRINE SULFATE 5 MG: 50 INJECTION INTRAMUSCULAR; INTRAVENOUS; SUBCUTANEOUS at 12:03

## 2018-03-15 RX ADMIN — EPHEDRINE SULFATE 5 MG: 50 INJECTION INTRAMUSCULAR; INTRAVENOUS; SUBCUTANEOUS at 11:03

## 2018-03-15 RX ADMIN — EPHEDRINE SULFATE 5 MG: 50 INJECTION INTRAMUSCULAR; INTRAVENOUS; SUBCUTANEOUS at 10:03

## 2018-03-15 RX ADMIN — CEFAZOLIN SODIUM 2 G: 2 SOLUTION INTRAVENOUS at 10:03

## 2018-03-15 RX ADMIN — PHENYLEPHRINE HYDROCHLORIDE 100 MCG: 10 INJECTION INTRAVENOUS at 10:03

## 2018-03-15 RX ADMIN — VANCOMYCIN HYDROCHLORIDE 1 G: 1 INJECTION, POWDER, LYOPHILIZED, FOR SOLUTION INTRAVENOUS at 11:03

## 2018-03-15 RX ADMIN — OXYCODONE HYDROCHLORIDE 10 MG: 10 TABLET, FILM COATED, EXTENDED RELEASE ORAL at 08:03

## 2018-03-15 RX ADMIN — LIDOCAINE HYDROCHLORIDE 75 MG: 20 INJECTION, SOLUTION INTRAVENOUS at 10:03

## 2018-03-15 RX ADMIN — INSULIN ASPART 2 UNITS: 100 INJECTION, SOLUTION INTRAVENOUS; SUBCUTANEOUS at 05:03

## 2018-03-15 RX ADMIN — GABAPENTIN 300 MG: 300 CAPSULE ORAL at 08:03

## 2018-03-15 RX ADMIN — FENTANYL CITRATE 50 MCG: 50 INJECTION, SOLUTION INTRAMUSCULAR; INTRAVENOUS at 12:03

## 2018-03-15 RX ADMIN — CLONIDINE HYDROCHLORIDE 0.5 MG: 0.3 TABLET ORAL at 11:03

## 2018-03-15 RX ADMIN — FENTANYL CITRATE 100 MCG: 50 INJECTION, SOLUTION INTRAMUSCULAR; INTRAVENOUS at 10:03

## 2018-03-15 RX ADMIN — SUCCINYLCHOLINE CHLORIDE 140 MG: 20 INJECTION, SOLUTION INTRAMUSCULAR; INTRAVENOUS at 10:03

## 2018-03-15 RX ADMIN — TRAMADOL HYDROCHLORIDE 50 MG: 50 TABLET, COATED ORAL at 06:03

## 2018-03-15 RX ADMIN — SODIUM CHLORIDE, SODIUM LACTATE, POTASSIUM CHLORIDE, AND CALCIUM CHLORIDE: .6; .31; .03; .02 INJECTION, SOLUTION INTRAVENOUS at 09:03

## 2018-03-15 RX ADMIN — DEXAMETHASONE SODIUM PHOSPHATE 4 MG: 4 INJECTION, SOLUTION INTRAMUSCULAR; INTRAVENOUS at 11:03

## 2018-03-15 RX ADMIN — ROCURONIUM BROMIDE 20 MG: 10 INJECTION, SOLUTION INTRAVENOUS at 10:03

## 2018-03-15 RX ADMIN — CELECOXIB 100 MG: 100 CAPSULE ORAL at 08:03

## 2018-03-15 RX ADMIN — INSULIN ASPART 1 UNITS: 100 INJECTION, SOLUTION INTRAVENOUS; SUBCUTANEOUS at 08:03

## 2018-03-15 RX ADMIN — PREGABALIN 25 MG: 25 CAPSULE ORAL at 08:03

## 2018-03-15 RX ADMIN — OXYCODONE HYDROCHLORIDE 5 MG: 5 TABLET ORAL at 02:03

## 2018-03-15 RX ADMIN — THROMBIN, TOPICAL (BOVINE) 20000 UNITS: KIT at 11:03

## 2018-03-15 RX ADMIN — ACETAMINOPHEN 1000 MG: 10 INJECTION, SOLUTION INTRAVENOUS at 08:03

## 2018-03-15 RX ADMIN — Medication 1 G: at 11:03

## 2018-03-15 RX ADMIN — PHENYLEPHRINE HYDROCHLORIDE 50 MCG: 10 INJECTION INTRAVENOUS at 01:03

## 2018-03-15 RX ADMIN — EPHEDRINE SULFATE 5 MG: 50 INJECTION INTRAMUSCULAR; INTRAVENOUS; SUBCUTANEOUS at 01:03

## 2018-03-15 RX ADMIN — ROCURONIUM BROMIDE 5 MG: 10 INJECTION, SOLUTION INTRAVENOUS at 10:03

## 2018-03-15 RX ADMIN — POTASSIUM CHLORIDE AND SODIUM CHLORIDE: 900; 150 INJECTION, SOLUTION INTRAVENOUS at 05:03

## 2018-03-15 RX ADMIN — SIMVASTATIN 40 MG: 40 TABLET, FILM COATED ORAL at 08:03

## 2018-03-15 RX ADMIN — BUPIVACAINE HYDROCHLORIDE 10 ML: 2.5 INJECTION, SOLUTION EPIDURAL; INFILTRATION; INTRACAUDAL; PERINEURAL at 11:03

## 2018-03-15 RX ADMIN — SODIUM CHLORIDE, SODIUM LACTATE, POTASSIUM CHLORIDE, AND CALCIUM CHLORIDE: .6; .31; .03; .02 INJECTION, SOLUTION INTRAVENOUS at 11:03

## 2018-03-15 RX ADMIN — ONDANSETRON 8 MG: 2 INJECTION, SOLUTION INTRAMUSCULAR; INTRAVENOUS at 01:03

## 2018-03-15 RX ADMIN — PROPOFOL 200 MG: 10 INJECTION, EMULSION INTRAVENOUS at 10:03

## 2018-03-15 RX ADMIN — ACETAMINOPHEN 1000 MG: 500 TABLET ORAL at 08:03

## 2018-03-15 RX ADMIN — FENTANYL CITRATE 50 MCG: 50 INJECTION, SOLUTION INTRAMUSCULAR; INTRAVENOUS at 09:03

## 2018-03-15 RX ADMIN — BACITRACIN 50000 UNITS: 5000 INJECTION, POWDER, FOR SOLUTION INTRAMUSCULAR at 11:03

## 2018-03-15 RX ADMIN — PHENYLEPHRINE HYDROCHLORIDE 100 MCG: 10 INJECTION INTRAVENOUS at 12:03

## 2018-03-15 RX ADMIN — CYCLOBENZAPRINE HYDROCHLORIDE 5 MG: 5 TABLET, FILM COATED ORAL at 08:03

## 2018-03-15 RX ADMIN — MUPIROCIN 1 G: 20 OINTMENT TOPICAL at 08:03

## 2018-03-15 NOTE — OP NOTE
DATE OF PROCEDURE: 3/15/2018     PREOPERATIVE DIAGNOSES:  1. 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 Bilateral L4-5 foraminal stenosis with L4 radiculopathy  3. Bilateral L4-5 lateral recess stenosis with L5 radiculopathy     POSTOPERATIVE DIAGNOSES:  1. 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 Bilateral L4-5 foraminal stenosis with L4 radiculopathy  3. Bilateral L4-5 lateral recess stenosis with L5 radiculopathy     PROCEDURES:     1. Left minimally invasive access to L2-3  2. Bilateral minimally invasive access to L4-5  3. L2-3 bilateral laminectomy and medial facetectomy  4. Bilateral L4-5 laminectomy, medial facetectomy and foraminotomy  5. Microscope assistance     PRIMARY SURGEON: Riley Diaz M.D.     ASSISTANT SURGEON: DUNCAN Caban was the first assistant for this procedure as there was no qualified resident available to assist.      INDICATIONS: This is a 72-year-old male with moderate to severe spinal stenosis at L2-3 and severe bilateral L4-5 foraminal stenosis, worsening difficulty walking and bilateral leg pain with bilateral foot dorsiflexion weakness that were refractory to conservative management. The risks of the procedure include hemorrhage, infection, paralysis, loss of sensation, loss of sphincter functions, death and postoperative medical complication.     Complexity: This was deemed to be a more complex procedure requiring more time (one hour more) and skills because a bilateral approach was needed at L4-5 to achieve the bilateral foraminotomy.      DESCRIPTION OF THE PROCEDURE: The patient was intubated under general   anesthesia. He was placed prone on the Leonides table frame. All pressure points   were carefully padded. Using fluoroscopy, 1 x 18 mm incision was planned 15 mm left to the midline at L2 and L3. Incision was made with # 15 blade. Subcutaneous tissue hemostasis was completed and the thoracolumbar fascia was opened with a k-wire. We then  inserted the serial dilators and a final 18 mm x 6 cm tubular retractor was docked at the junction of the inferior lamina and the left inferior    facet of L2 and the retractor was fixed on the table and then using a    microscope, the multifidus muscle was subperiosteally dissected using the    monopolar. We then exposed the base of the spinous process, the lamina, and the  medial facets. Using the high speed sukumar, we drilled the base of the spinous    process of L2, the ipsilateral and contralateral lamina of L2-3, as well as bilateral   medial facet. The yellow ligament insertion was exposed and    the ligament was resected using Kerrison ipsilaterally and contralaterally.      The L3 nerve roots in the bilateral  lateral recesses were decompressed with Kerrison by removing    the hypertrophic yellow ligament and one-fourth of the superior L3 facet bilaterally.      After the decompression, the dura reexpanded and hemostasis was  completed by using gelfoam powder soaked in thrombin in the epidural space. We copiously irrigated with physiological solution and hemostasis was completed with gel foam powder. We removed the retractor.      Using fluoroscopy, 1 x 18 mm incision was planned 25 mm left to the midline at L4 and L5. Incision was made with # 15 blade. Subcutaneous tissue hemostasis was completed and the thoracolumbar fascia was opened with a k-wire. We then inserted the serial dilators and a final 18 mm x 7 cm tubular retractor was docked at the junction of the inferior lamina and the left inferior facet of L4 and the retractor was fixed on the table and then using a    microscope, the multifidus muscle was subperiosteally dissected using the    monopolar. We then exposed the base of the spinous process, the lamina, and the  medial facets. Using the high speed sukumar, we drilled the base of the spinous    process of L4, the ipsilateral and contralateral lamina of L4-5, as well as bilateral   medial facet. The  yellow ligament insertion was exposed and    the ligament was resected using Kerrison ipsilaterally and contralaterally.      The L5 nerve roots in the bilateral  lateral recesses were decompressed with Kerrison by removing the hypertrophic yellow ligament and one-fourth of the superior L5 facet bilaterally.    We then drilled the left inferior and superior facets at L4-5 and decompress the foramen until we identified the L4 nerve root contralaterally.    Using fluoroscopy, 1 x 18 mm incision was planned 25 mm right to the midline at L4 and L5. Incision was made with # 15 blade. Subcutaneous tissue hemostasis was completed and the thoracolumbar fascia was opened with a k-wire. We then inserted the serial dilators and a final 18 mm x 7 cm tubular retractor was docked at the junction of the inferior lamina and the left inferior facet of L4 and the retractor was fixed on the table and then using a    microscope, the multifidus muscle was subperiosteally dissected using the    monopolar. We then exposed the base of the spinous process, the lamina, and the  medial facets. Using the high speed sukumar, we drilled the remaining of the base of the spinous    process of L4, and enlarged the laminectomy contralaterally.     We then drilled the right inferior and superior facet at L4-5 and decompress the foramen until we identified the L4 nerve root contralaterally.    The thoracolumbar fascia was closed with 0 Vicryl. The wound's subcutaneous layer were closed with inverted 2-0 Vicryl and the skin was closed with a running subcuticular 4-0    Monocryl. The wounds were dressed with Steri-Strips and the patient was    transferred to the Recovery Room under the care of the anesthesiologist. Blood    loss was 50 mL. There was no complication.

## 2018-03-15 NOTE — TRANSFER OF CARE
"Anesthesia Transfer of Care Note    Patient: Philip Kam Sr.    Procedure(s) Performed: Procedure(s) (LRB):  LAMINECTOMY-LUMBAR Bilateral L2-3,L4-5 Contralateral Medial Facetectomy Laminectomy and Foraminotomy/ (Bilateral)    Patient location: PACU    Anesthesia Type: general    Transport from OR: Transported from OR on 6-10 L/min O2 by face mask with adequate spontaneous ventilation    Post pain: adequate analgesia    Post assessment: no apparent anesthetic complications    Post vital signs: stable    Level of consciousness: sedated    Nausea/Vomiting: no nausea/vomiting    Complications: none    Transfer of care protocol was followed      Last vitals:   Visit Vitals  BP (!) 185/100 (BP Location: Left arm, Patient Position: Lying)   Pulse 75   Temp 36.6 °C (97.8 °F) (Skin)   Resp 18   Ht 5' 8" (1.727 m)   Wt 108.9 kg (240 lb)   SpO2 99%   BMI 36.49 kg/m²     "

## 2018-03-15 NOTE — ANESTHESIA POSTPROCEDURE EVALUATION
"Anesthesia Post Evaluation    Patient: Philip Kam .    Procedure(s) Performed: Procedure(s) (LRB):  LAMINECTOMY-LUMBAR Bilateral L2-3,L4-5 Contralateral Medial Facetectomy Laminectomy and Foraminotomy/ (Bilateral)    Final Anesthesia Type: MAC  Patient location during evaluation: GI PACU  Patient participation: Yes- Able to Participate  Level of consciousness: awake and alert  Post-procedure vital signs: reviewed and stable  Pain management: adequate  Airway patency: patent  PONV status at discharge: No PONV  Anesthetic complications: no      Cardiovascular status: blood pressure returned to baseline and hemodynamically stable  Respiratory status: unassisted, spontaneous ventilation and room air  Hydration status: euvolemic  Follow-up not needed.        Visit Vitals  BP (!) 185/100 (BP Location: Left arm, Patient Position: Lying)   Pulse 75   Temp 36.6 °C (97.8 °F) (Skin)   Resp 18   Ht 5' 8" (1.727 m)   Wt 108.9 kg (240 lb)   SpO2 99%   BMI 36.49 kg/m²       Pain/Zuly Score: Pain Assessment Performed: Yes (3/15/2018  8:10 AM)  Presence of Pain: denies (3/15/2018  8:10 AM)  Pain Rating Prior to Med Admin: 0 (3/15/2018  8:23 AM)      "

## 2018-03-15 NOTE — HPI
72 y.o. male with past medical history (below) who is undergoing a procedure today with Dr. Diaz. I was consulted intraoperatively to aid in a urethral ackerman placement. After anesthetic induction, the nursing staff was unable to place a urethral ackerman and I was called intraoperatively into OR 10. The patient was asleep and under anesthesia.     Past Medical History:   Diagnosis Date    Anxiety     High cholesterol     Hypertension     Hypothyroidism     Type 2 diabetes mellitus, uncontrolled

## 2018-03-15 NOTE — ASSESSMENT & PLAN NOTE
72 y.o. male under anesthesia for a neurosurgery procedure, nursing staff unable to place urethral ackerman, therefore I was consulted for ackerman placement    Plan:  1. Ackerman placed without difficulty, see operative note.  2. No special needs or stipulations for ackerman catheter, can discontinue per neurosurgery routine. No urologic followup necessary.  3. No additional antibiotics necessary.

## 2018-03-15 NOTE — PROGRESS NOTES
Pt is awake, alert and oriented.  Minimal c/o pain, states that it feels like a burning sensation near the incision.  Medications given as prescribed.  Fluids running at 75ml/hr, 0.9% NS with 20 mEq Kcl.  Bed alarm set, family left.  Pt has ackerman, clear yellow urine.  VSS.  Safety maintained since coming to the unit.  No acute distress noted.  Will continue to monitor.

## 2018-03-15 NOTE — OP NOTE
OPERATIVE DICTATION  DATE OF OPERATION: 3/15/2018    SERVICE: Neurosurgery    SURGEONS:  1. Ingrid Davis MD    ANESTHESIA:  No anesthesia staff entered.    STAFF:  Circulator: Rosalina Henderson RN  Scrub Person: ST Radha  Technician: Quintin Arana RT    ANESTHESIA: General    PREOPERATIVE DIAGNOSIS: Pre-Op Diagnosis Codes:     * Lumbar radiculopathy [M54.16]     * Bilateral stenosis of lateral recess of lumbar spine [M48.061]    POSTOPERATIVE DIAGNOSIS: Post-Op Diagnosis Codes:     * Lumbar radiculopathy [M54.16]     * Bilateral stenosis of lateral recess of lumbar spine [M48.061]    PROCEDURES:   1. Ackerman catheter placement under anesthesia    COMPLICATIONS: * No complications entered in OR log *    DRAINS: None    TUBES: 16 [place] urethral ackerman    IMPLANTS: None    FLUIDS: see anesthesia record     ESTIMATED BLOOD LOSS: None    FINDINGS:   1. Slightly narrowed urethra distally at the fossa navicularis. Ackerman was able to be passed after a hemostat was used to stiffen the ackerman tip. No special recommendations, no urologic followup necessary. Neurosurgery team can discontinue ackerman per their usual routine.    SPECIMEN(S):   None    CONDITION: stable    INDICATIONS FOR THE PROCEDURE:  72 y.o. male undergoing laminectomy with Dr. Diaz today in the operating room. I was paged to the operating room as the nursing staff was unable to pass a urethral ackerman for the duration of the procedure for Dr. Diaz.     PROCEDURE IN DETAIL:  I met the patient while he was intubated, sedated and awaiting the procedure with Dr. Diaz. Examination revealed that he is uncircumcised, exhibited a normal urethral meatus with a possible slight ventral dimple a few millimeters from the urethral meatus.     I initially tried one pass with the 16 [place] urethral ackerman and again met resistance distally at the fossa navicularis. I used a hemostat to stiffen the tip of the ackerman catheter and then was able to pass the ackerman into the  bladder without difficulty. The urine in the tube was light yellow and 10cc of normal saline was used to inflate the ackerman balloon.     This concluded the procedure.    ATTENDING ATTESTATION  I was present and scrubbed for the entire duration  of the procedure.      CASE DURATION:  * Missing case tracking time(s) *    DISPOSITION:   The patient tolerated the procedure well. he was extubated, and taken to post-anesthesia care unit in satisfactory condition.  The ackerman catheter can be removed at the neurosurgery service's discretion.     Ingrid Davis MD

## 2018-03-15 NOTE — CONSULTS
[facility]  Urology  Consult Note    Patient Name: Philip Kam Sr.  MRN: [id number]  Admission Date: 3/15/2018  Hospital Length of Stay: 0   Code Status: No Order   Attending Provider: Riley Diaz MD   Consulting Provider: Yesi Jack MD  Primary Care Physician: Suraj Chase Iii, MD  Principal Problem:<principal problem not specified>    Inpatient consult to Urology  Consult performed by: YESI JACK  Consult ordered by: AVA XIAO  Reason for consult: ackerman placement  Assessment/Recommendations: 72 y.o. male under anesthesia for a neurosurgery procedure, nursing staff unable to place urethral ackerman, therefore I was consulted for ackerman placement    Plan:  1. Ackerman placed without difficulty, see operative note.  2. No special needs or stipulations for ackerman catheter, can discontinue per neurosurgery routine. No urologic followup necessary.  3. No additional antibiotics necessary.          Subjective:     HPI:  72 y.o. male with past medical history (below) who is undergoing a procedure today with Dr. Diaz. I was consulted intraoperatively to aid in a urethral ackerman placement. After anesthetic induction, the nursing staff was unable to place a urethral ackerman and I was called intraoperatively into OR 10. The patient was asleep and under anesthesia.     Past Medical History:   Diagnosis Date    Anxiety     High cholesterol     Hypertension     Hypothyroidism     Type 2 diabetes mellitus, uncontrolled        Past Medical History:   Diagnosis Date    Anxiety     High cholesterol     Hypertension     Hypothyroidism     Type 2 diabetes mellitus, uncontrolled        Past Surgical History:   Procedure Laterality Date    COLONOSCOPY      ESOPHAGUS SURGERY  2012    EYE SURGERY      LUMBAR EPIDURAL INJECTION  01/2018    x2    TONSILLECTOMY         Review of patient's allergies indicates:   Allergen Reactions    Codeine Itching       Family History     Problem Relation (Age of  "Onset)    Heart attack Son          Social History Main Topics    Smoking status: Never Smoker    Smokeless tobacco: Never Used    Alcohol use 0.6 oz/week     1 Cans of beer per week      Comment: Everyother day_Pt stated,"I drink a few beers."    Drug use: No    Sexual activity: Not on file       Review of Systems   Unable to perform ROS: Intubated       Objective:     Temp:  [97.8 °F (36.6 °C)] 97.8 °F (36.6 °C)  Pulse:  [75] 75  Resp:  [18] 18  SpO2:  [99 %] 99 %  BP: (185)/(100) 185/100     Body mass index is 36.49 kg/m².            Drains     Drain                 Urethral Catheter 03/15/18 1037 Non-latex 16 Fr. less than 1 day                Physical Exam   Constitutional: He appears well-developed and well-nourished.   HENT:   Head: Normocephalic.   Endotracheal tube in place   Neck: No thyromegaly present.   Cardiovascular: Intact distal pulses.    Pulmonary/Chest:   intubated   Abdominal: Soft. He exhibits no distension. There is no tenderness.   Genitourinary:   Genitourinary Comments: Uncircumcised phallus, normal orthotopic meatus, small urethral dimple in ventral meatus   Neurological:   Intubated, sedated, under anesthesia   Skin: Skin is dry.         Significant Labs:    BMP:  No results for input(s): NA, K, CL, CO2, BUN, CREATININE, LABGLOM, GLUCOSE, CALCIUM in the last 168 hours.    CBC:  No results for input(s): WBC, HGB, HCT, PLT in the last 168 hours.    All pertinent labs results from the past 24 hours have been reviewed.    Significant Imaging:   none                    Assessment and Plan:     Ackerman catheter problem    72 y.o. male under anesthesia for a neurosurgery procedure, nursing staff unable to place urethral ackerman, therefore I was consulted for ackerman placement    Plan:  1. Ackerman placed without difficulty, see operative note.  2. No special needs or stipulations for ackerman catheter, can discontinue per neurosurgery routine. No urologic followup necessary.  3. No additional " antibiotics necessary.            VTE Risk Mitigation         Ordered     Medium Risk of VTE  Once      03/15/18 0800     Place sequential compression device  Until discontinued      03/15/18 0800     Place DANIELA hose  Until discontinued      03/15/18 0800          Thank you for your consult.      Ingrid Davis MD  Urology  [facility]

## 2018-03-15 NOTE — H&P
NEUROSURGICAL OUTPATIENT CONSULTATION NOTE     DATE OF SERVICE:  03/15/2018     ATTENDING PHYSICIAN:  Riley Diaz MD     CONSULT REQUESTED BY:  NIRMAL Perdomo MD     REASON FOR CONSULT:  Bilateral legs pain     SUBJECTIVE:     HISTORY OF PRESENT ILLNESS:  This is a very pleasant 72 y.o. male who has been complaining for bilateral legs pain in the L4 distribution for more than 4 months. The pain has been worsening. He has bilateral dorsiflexion weakness and difficulty walking. He walks leaning forward. The legs pain is triggered by standing up and walking and relieved by sitting and lying down. He had 6 weeks of PT and epidural steroid injections without significant improvement. He does not have back pain.      Low Back Pain Scale  R Low Back-Pain Score: 6  R Low Back-Pain Intensity: Pain killers give very little relief from pain  R Low Back-Pain Score: I need some help, but manage most of my personal care  Low Back-Lifting: I can only lift very light weights   Low Back-Walking: I can only walk using a cane or crutches   Low Back-Sitting: I can sit in any chair as long as I like   Low Back-Standing: I cannot stand for longer than 10 minutes with increasing pain   Low Back-Sleeping: I have no pain in bed   Low Back-Social Life: Pain has restricted my social life and I do not go out very often   Low Back-Traveling: I have no pain when traveling   Low Back-Changing Degree of Pain: My pain seems to be getting better but improvement is slow        PAST MEDICAL HISTORY:       Active Ambulatory Problems     Diagnosis Date Noted    Degenerative lumbar spinal stenosis 12/19/2017    DDD (degenerative disc disease), lumbar 12/19/2017    Lumbar spondylosis 01/23/2018           Resolved Ambulatory Problems     Diagnosis Date Noted    Chronic bilateral low back pain with bilateral sciatica 11/13/2017    Weakness of both lower extremities 11/13/2017      Past Medical History:   Diagnosis Date    Anxiety      Diabetes  "mellitus      High cholesterol      Hypertension      Thyroid disease           PAST SURGICAL HISTORY:        Past Surgical History:   Procedure Laterality Date    COLONOSCOPY        Eshophagus Tear        EYE SURGERY        TONSILLECTOMY             SOCIAL HISTORY:   Social History   Social History            Social History    Marital status:        Spouse name: N/A    Number of children: N/A    Years of education: N/A          Occupational History    Not on file.             Social History Main Topics    Smoking status: Never Smoker    Smokeless tobacco: Never Used    Alcohol use 0.6 oz/week       1 Cans of beer per week         Comment: Everyother day_Pt stated,"I drink a few beers."    Drug use: No    Sexual activity: Not on file           Other Topics Concern    Not on file      Social History Narrative    No narrative on file            FAMILY HISTORY:        Family History   Problem Relation Age of Onset    Heart attack Son           CURRENTS MEDICATIONS:         Current Outpatient Prescriptions on File Prior to Visit   Medication Sig Dispense Refill    allopurinol (ZYLOPRIM) 300 MG tablet Take 300 mg by mouth once daily.         clonazePAM (KLONOPIN) 0.5 MG tablet Take 0.5 mg by mouth as needed for Anxiety. Once        furosemide (LASIX) 40 MG tablet 40 mg once daily.         hydrocodone-acetaminophen 5-325mg (NORCO) 5-325 mg per tablet Take 1 tablet by mouth every 8 (eight) hours as needed for Pain. 40 tablet 0    levothyroxine (SYNTHROID) 100 MCG tablet Take 100 mcg by mouth once daily.         metformin (GLUCOPHAGE) 500 MG tablet Take 500 mg by mouth 2 (two) times daily with meals.        pantoprazole (PROTONIX) 40 MG tablet Take 40 mg by mouth once daily.         simvastatin (ZOCOR) 40 MG tablet Take 40 mg by mouth every evening.        gabapentin (NEURONTIN) 300 MG capsule Take 1 capsule (300 mg total) by mouth 3 (three) times daily. Take 1 cap QHS x 3 days. Increase " by 1 cap every 3 days until taking TID. 90 capsule 11    levocetirizine (XYZAL) 5 MG tablet Take 5 mg by mouth as needed.         lisinopril-hydrochlorothiazide (PRINZIDE,ZESTORETIC) 20-12.5 mg per tablet Take 1 tablet by mouth once daily.        SITagliptin (JANUVIA) 100 MG Tab Take 100 mg by mouth once daily.          No current facility-administered medications on file prior to visit.          ALLERGIES:       Review of patient's allergies indicates:   Allergen Reactions    Codeine Itching         REVIEW OF SYSTEMS:  Review of Systems   Constitutional: Negative for diaphoresis, fever and weight loss.   Respiratory: Negative for shortness of breath.    Cardiovascular: Negative for chest pain.   Gastrointestinal: Negative for blood in stool.   Genitourinary: Negative for hematuria.   Endo/Heme/Allergies: Does not bruise/bleed easily.   All other systems reviewed and are negative.        OBJECTIVE:     PHYSICAL EXAMINATION:       Vitals:     02/20/18 0948   BP: (!) 151/88   Pulse: 75         Physical Exam:  Vitals reviewed.     Constitutional: He appears well-developed and well-nourished.      Eyes: Pupils are equal, round, and reactive to light. Conjunctivae and EOM are normal.      Cardiovascular: Normal distal pulses and no edema.      Abdominal: Soft.      Skin: Skin displays no rash on trunk and no rash on extremities. Skin displays no lesions on trunk and no lesions on extremities.     Psych/Behavior: He is alert. He is oriented to person, place, and time. He has a normal mood and affect.     Musculoskeletal:        Neck: Range of motion is full.     Neurological:        DTRs: Tricep reflexes are 2+ on the right side and 2+ on the left side. Bicep reflexes are 2+ on the right side and 2+ on the left side. Brachioradialis reflexes are 2+ on the right side and 2+ on the left side. Patellar reflexes are 0 on the right side and 0 on the left side. Achilles reflexes are 0 on the right side and 0 on the left  side.         Back Exam      Tenderness   The patient is experiencing no tenderness.      Range of Motion   Extension: normal   Flexion: normal   Lateral Bend Right: normal   Lateral Bend Left: normal   Rotation Right: normal   Rotation Left: normal      Muscle Strength   Right Quadriceps:  5/5   Left Quadriceps:  5/5   Right Hamstrings:  5/5   Left Hamstrings:  5/5      Tests   Straight leg raise right: negative  Straight leg raise left: negative     Other   Toe Walk: normal  Heel Walk: normal                    Neurologic Exam      Mental Status   Oriented to person, place, and time.   Speech: speech is normal   Level of consciousness: alert     Cranial Nerves   Cranial nerves II through XII intact.      CN III, IV, VI   Pupils are equal, round, and reactive to light.  Extraocular motions are normal.      Motor Exam   Muscle bulk: normal  Overall muscle tone: normal     Strength   Right deltoid: 5/5  Left deltoid: 5/5  Right biceps: 5/5  Left biceps: 5/5  Right triceps: 5/5  Left triceps: 5/5  Right wrist flexion: 5/5  Left wrist flexion: 5/5  Right wrist extension: 5/5  Left wrist extension: 5/5  Right interossei: 5/5  Left interossei: 5/5  Right iliopsoas: 5/5  Left iliopsoas: 5/5  Right quadriceps: 5/5  Left quadriceps: 5/5  Right hamstrin/5  Left hamstrin/5  Right anterior tibial: 4/5  Left anterior tibial: 4/5  Right posterior tibial: 5/5  Left posterior tibial: 5/5  Right peroneal: 5/5  Left peroneal: 5/5  Right gastroc: 5/5  Left gastroc: 5/5     Sensory Exam   Light touch normal.   Pinprick normal.      Gait, Coordination, and Reflexes      Gait  Gait: (antalgic)     Coordination   Finger to nose coordination: normal  Tandem walking coordination: normal     Reflexes   Right brachioradialis: 2+  Left brachioradialis: 2+  Right biceps: 2+  Left biceps: 2+  Right triceps: 2+  Left triceps: 2+  Right patellar: 0  Left patellar: 0  Right achilles: 0  Left achilles: 0  Right plantar: normal  Left  plantar: normal  Right Velazquez: absent  Left Velazquez: absent  Right ankle clonus: absent  Left ankle clonus: absent           DIAGNOSTIC DATA:  I personally reviewed the following imaging:   Lumbar spine MRI 10/2017: diffuse spondylosis, bilateral severe L4-5 lateral recess and foraminal stenosis     MRI 02/26/2018: Moderate to severe stenosis at L2-3, bilateral severe L4-5 lateral recess and foraminal stenosis    ASSESMENT:  This is a 72 y.o. male with          Problem List Items Addressed This Visit      None               Visit Diagnoses      Bilateral stenosis of lateral recess of lumbar spine    -  Primary     Foraminal stenosis of lumbar region         Lumbosacral radiculopathy at L4            Lumbar stenosis with neurogenic claudication     PLAN:  I explained the natural history of the disease and all treatment options. I recommended a, L2-3 bilateral laminectomy, medial facetectomy and a bilateral L4-5  laminectomy, medial facetectomy and contralateral foraminotomy.      We have discussed the risks of surgery including bleeding, infection, failure of surgery, CSF leak, nerve root injury, spinal cord injury, weakness, paralysis, peripheral neuropathy, need for reoperation. Patient understands the risks and would like to proceed with surgery.       The patient has increased perioperative risks because of these comorbidities: type 2 diabetes.           Riley Diaz MD  Pager: [phone]

## 2018-03-15 NOTE — SUBJECTIVE & OBJECTIVE
"Past Medical History:   Diagnosis Date    Anxiety     High cholesterol     Hypertension     Hypothyroidism     Type 2 diabetes mellitus, uncontrolled        Past Surgical History:   Procedure Laterality Date    COLONOSCOPY      ESOPHAGUS SURGERY  2012    EYE SURGERY      LUMBAR EPIDURAL INJECTION  01/2018    x2    TONSILLECTOMY         Review of patient's allergies indicates:   Allergen Reactions    Codeine Itching       Family History     Problem Relation (Age of Onset)    Heart attack Son          Social History Main Topics    Smoking status: Never Smoker    Smokeless tobacco: Never Used    Alcohol use 0.6 oz/week     1 Cans of beer per week      Comment: Everyother day_Pt stated,"I drink a few beers."    Drug use: No    Sexual activity: Not on file       Review of Systems   Unable to perform ROS: Intubated       Objective:     Temp:  [97.8 °F (36.6 °C)] 97.8 °F (36.6 °C)  Pulse:  [75] 75  Resp:  [18] 18  SpO2:  [99 %] 99 %  BP: (185)/(100) 185/100     Body mass index is 36.49 kg/m².            Drains     Drain                 Urethral Catheter 03/15/18 1037 Non-latex 16 Fr. less than 1 day                Physical Exam   Constitutional: He appears well-developed and well-nourished.   HENT:   Head: Normocephalic.   Endotracheal tube in place   Neck: No thyromegaly present.   Cardiovascular: Intact distal pulses.    Pulmonary/Chest:   intubated   Abdominal: Soft. He exhibits no distension. There is no tenderness.   Genitourinary:   Genitourinary Comments: Uncircumcised phallus, normal orthotopic meatus, small urethral dimple in ventral meatus   Neurological:   Intubated, sedated, under anesthesia   Skin: Skin is dry.         Significant Labs:    BMP:  No results for input(s): NA, K, CL, CO2, BUN, CREATININE, LABGLOM, GLUCOSE, CALCIUM in the last 168 hours.    CBC:  No results for input(s): WBC, HGB, HCT, PLT in the last 168 hours.    All pertinent labs results from the past 24 hours have been " reviewed.    Significant Imaging:   none

## 2018-03-16 VITALS
WEIGHT: 258.81 LBS | SYSTOLIC BLOOD PRESSURE: 95 MMHG | TEMPERATURE: 97 F | HEIGHT: 68 IN | RESPIRATION RATE: 20 BRPM | DIASTOLIC BLOOD PRESSURE: 51 MMHG | BODY MASS INDEX: 39.22 KG/M2 | HEART RATE: 64 BPM | OXYGEN SATURATION: 97 %

## 2018-03-16 LAB
POCT GLUCOSE: 196 MG/DL (ref 70–110)
POCT GLUCOSE: 215 MG/DL (ref 70–110)
POCT GLUCOSE: 222 MG/DL (ref 70–110)

## 2018-03-16 PROCEDURE — 25000003 PHARM REV CODE 250: Performed by: NEUROLOGICAL SURGERY

## 2018-03-16 PROCEDURE — G8978 MOBILITY CURRENT STATUS: HCPCS | Mod: CK

## 2018-03-16 PROCEDURE — 94799 UNLISTED PULMONARY SVC/PX: CPT

## 2018-03-16 PROCEDURE — 97110 THERAPEUTIC EXERCISES: CPT

## 2018-03-16 PROCEDURE — 99024 POSTOP FOLLOW-UP VISIT: CPT | Mod: ,,, | Performed by: PHYSICIAN ASSISTANT

## 2018-03-16 PROCEDURE — 25000003 PHARM REV CODE 250: Performed by: PHYSICIAN ASSISTANT

## 2018-03-16 PROCEDURE — G8988 SELF CARE GOAL STATUS: HCPCS | Mod: CI

## 2018-03-16 PROCEDURE — 97165 OT EVAL LOW COMPLEX 30 MIN: CPT

## 2018-03-16 PROCEDURE — 97535 SELF CARE MNGMENT TRAINING: CPT

## 2018-03-16 PROCEDURE — 63600175 PHARM REV CODE 636 W HCPCS: Performed by: PHYSICIAN ASSISTANT

## 2018-03-16 PROCEDURE — G8987 SELF CARE CURRENT STATUS: HCPCS | Mod: CJ

## 2018-03-16 PROCEDURE — 94761 N-INVAS EAR/PLS OXIMETRY MLT: CPT

## 2018-03-16 PROCEDURE — G8979 MOBILITY GOAL STATUS: HCPCS | Mod: CJ

## 2018-03-16 PROCEDURE — 97161 PT EVAL LOW COMPLEX 20 MIN: CPT

## 2018-03-16 RX ORDER — TRAMADOL HYDROCHLORIDE 50 MG/1
50 TABLET ORAL EVERY 6 HOURS PRN
Qty: 60 TABLET | Refills: 0 | Status: SHIPPED | OUTPATIENT
Start: 2018-03-16 | End: 2018-03-16

## 2018-03-16 RX ORDER — OXYCODONE HYDROCHLORIDE 5 MG/1
5 TABLET ORAL EVERY 4 HOURS PRN
Qty: 20 TABLET | Refills: 0 | Status: SHIPPED | OUTPATIENT
Start: 2018-03-16 | End: 2018-03-16

## 2018-03-16 RX ORDER — HEPARIN SODIUM 5000 [USP'U]/ML
5000 INJECTION, SOLUTION INTRAVENOUS; SUBCUTANEOUS EVERY 8 HOURS
Status: DISCONTINUED | OUTPATIENT
Start: 2018-03-16 | End: 2018-03-16 | Stop reason: HOSPADM

## 2018-03-16 RX ORDER — AMOXICILLIN 250 MG
2 CAPSULE ORAL NIGHTLY PRN
Status: ON HOLD | COMMUNITY
Start: 2018-03-16 | End: 2021-03-14

## 2018-03-16 RX ORDER — TRAMADOL HYDROCHLORIDE 50 MG/1
50 TABLET ORAL EVERY 6 HOURS PRN
Qty: 60 TABLET | Refills: 0 | Status: SHIPPED | OUTPATIENT
Start: 2018-03-16 | End: 2018-03-26

## 2018-03-16 RX ORDER — TIZANIDINE 2 MG/1
2 TABLET ORAL EVERY 8 HOURS PRN
Qty: 60 TABLET | Refills: 0 | Status: SHIPPED | OUTPATIENT
Start: 2018-03-16 | End: 2018-03-26

## 2018-03-16 RX ORDER — TIZANIDINE 2 MG/1
2 TABLET ORAL EVERY 8 HOURS PRN
Qty: 60 TABLET | Refills: 0 | OUTPATIENT
Start: 2018-03-16 | End: 2018-03-16

## 2018-03-16 RX ORDER — OXYCODONE HYDROCHLORIDE 5 MG/1
5 TABLET ORAL EVERY 4 HOURS PRN
Qty: 20 TABLET | Refills: 0 | Status: SHIPPED | OUTPATIENT
Start: 2018-03-16 | End: 2018-04-02

## 2018-03-16 RX ADMIN — ACETAMINOPHEN 1000 MG: 500 TABLET ORAL at 05:03

## 2018-03-16 RX ADMIN — FUROSEMIDE 40 MG: 40 TABLET ORAL at 09:03

## 2018-03-16 RX ADMIN — OXYCODONE HYDROCHLORIDE 5 MG: 5 TABLET ORAL at 09:03

## 2018-03-16 RX ADMIN — LEVOTHYROXINE SODIUM 100 MCG: 100 TABLET ORAL at 05:03

## 2018-03-16 RX ADMIN — TRAMADOL HYDROCHLORIDE 50 MG: 50 TABLET, COATED ORAL at 05:03

## 2018-03-16 RX ADMIN — HEPARIN SODIUM 5000 UNITS: 5000 INJECTION, SOLUTION INTRAVENOUS; SUBCUTANEOUS at 09:03

## 2018-03-16 RX ADMIN — CLONAZEPAM 0.5 MG: 0.5 TABLET ORAL at 09:03

## 2018-03-16 RX ADMIN — CELECOXIB 100 MG: 100 CAPSULE ORAL at 09:03

## 2018-03-16 RX ADMIN — SODIUM CHLORIDE 1000 ML: 0.9 INJECTION, SOLUTION INTRAVENOUS at 12:03

## 2018-03-16 RX ADMIN — INSULIN ASPART 2 UNITS: 100 INJECTION, SOLUTION INTRAVENOUS; SUBCUTANEOUS at 11:03

## 2018-03-16 RX ADMIN — ALLOPURINOL 300 MG: 100 TABLET ORAL at 09:03

## 2018-03-16 RX ADMIN — PANTOPRAZOLE SODIUM 40 MG: 40 TABLET, DELAYED RELEASE ORAL at 09:03

## 2018-03-16 RX ADMIN — GABAPENTIN 300 MG: 300 CAPSULE ORAL at 09:03

## 2018-03-16 NOTE — DISCHARGE SUMMARY
[facility]  Neurosurgery  Discharge Summary      Patient Name: Philip Kam Sr.  MRN: [id number]  Admission Date: 3/15/2018  Hospital Length of Stay: 0 days  Discharge Date and Time: 3/16/2018  2:31 PM  Attending Physician: Riley Diaz MD   Discharging Provider: Elizabeth Matta PA-C  Primary Care Provider: Suraj Chase Iii, MD     HPI: Philip Kam Sr. is a 72 y.o. male with moderate to severe spinal stenosis at L2-3 and severe bilateral L4-5 foraminal stenosis, worsening difficulty walking and bilateral leg pain with bilateral foot dorsiflexion weakness that were refractory to conservative management. Dr. Diaz recommended a L2-3 bilateral laminectomy, medial facetectomy and a bilateral L4-5  laminectomy, medial facetectomy and contralateral foraminotomy. The risks of the procedure include hemorrhage, infection, paralysis, loss of sensation, loss of sphincter functions, death and postoperative medical complication. Patient understands the risks and would like to proceed with surgery.    Procedure(s) (LRB):  LAMINECTOMY-LUMBAR Bilateral L2-3,L4-5 Contralateral Medial Facetectomy Laminectomy and Foraminotomy/ (Bilateral)     DATE OF PROCEDURE: 3/15/2018     PREOPERATIVE DIAGNOSES:  1. 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 Bilateral L4-5 foraminal stenosis with L4 radiculopathy  3. Bilateral L4-5 lateral recess stenosis with L5 radiculopathy     POSTOPERATIVE DIAGNOSES:  1. 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 Bilateral L4-5 foraminal stenosis with L4 radiculopathy  3. Bilateral L4-5 lateral recess stenosis with L5 radiculopathy     PROCEDURES:     1. Left minimally invasive access to L3-4  2. Bilateral minimally invasive access to L4-5  3. L2-3 bilateral laminectomy and medial facetectomy  4. Bilateral L4-5 laminectomy, medial facetectomy and foraminotomy  5. Microscope assistance     PRIMARY SURGEON: Riley Diaz M.D.     ASSISTANT SURGEON: Elizabeth Matta  PA    Hospital Course: Philip Kam Sr. presented to [facility] on 3/15/2018 for the above stated procedure. he tolerated the procedure well and there were no intra-operative complications. Dr. Davis with urology placed ackerman prior to surgery due to urethral narrowing. He recovered in PACU and was then transferred to the floor, where his pain was controlled. He was evaluated by PT/OT who recommended discharge home with family support. Ackerman was removed on 3/16, without complication and patient was able to void independently. Patient experienced hypotension which responded to fluid resuscitation. On 3/16/2018, he was discharged home with pain medication and follow up appointments. Regular diet. Activity as tolerated. At the time of discharge, vital signs were stable, patient was afebrile and neurologically stable. Discharge instructions were given verbally/written to the patient and his family and all of their questions were answered. Patient and family voiced understanding. They were encouraged to call the clinic with any questions they might have prior to the follow up appointments.      Consults:   Consults         Status Ordering Provider     Inpatient consult to Urology  Once     Provider:  Ingrid Davis MD    Completed AVA MATTA          Significant Diagnostic Studies: None    Pending Diagnostic Studies:     None        Final Active Diagnoses:    Diagnosis Date Noted POA    PRINCIPAL PROBLEM:  Lumbar radiculopathy [M54.16] 03/15/2018 Yes    Ackerman catheter problem [T83.9XXA] 03/15/2018 No      Problems Resolved During this Admission:    Diagnosis Date Noted Date Resolved POA      Discharged Condition: good    Disposition: Home or Self Care    Follow Up:  Follow-up Information     Ava Matta PA-C On 4/2/2018.    Specialty:  Neurosurgery  Why:  wound check   Contact information:  [address] [place] [id number] [phone]                 Patient Instructions:     Diet diabetic      Activity as tolerated     Shower on day dressing removed (No bath)     Notify your health care provider if you experience any of the following:  temperature >100.4     Notify your health care provider if you experience any of the following:  persistent nausea and vomiting or diarrhea     Notify your health care provider if you experience any of the following:  redness, tenderness, or signs of infection (pain, swelling, redness, odor or green/yellow discharge around incision site)     Notify your health care provider if you experience any of the following:  difficulty breathing or increased cough     Notify your health care provider if you experience any of the following:  severe uncontrolled pain     Notify your health care provider if you experience any of the following:  severe persistent headache     Notify your health care provider if you experience any of the following:  worsening rash     Notify your health care provider if you experience any of the following:  persistent dizziness, light-headedness, or visual disturbances     Notify your health care provider if you experience any of the following:  increased confusion or weakness     Remove dressing in 24 hours       Medications:  Reconciled Home Medications:   Current Discharge Medication List      START taking these medications    Details   oxyCODONE (ROXICODONE) 5 MG immediate release tablet Take 1 tablet (5 mg total) by mouth every 4 (four) hours as needed (severe pain).  Qty: 20 tablet, Refills: 0      senna-docusate 8.6-50 mg (PERICOLACE) 8.6-50 mg per tablet Take 2 tablets by mouth nightly as needed for Constipation.      tiZANidine (ZANAFLEX) 2 MG tablet Take 1 tablet (2 mg total) by mouth every 8 (eight) hours as needed (muscle spasms).  Qty: 60 tablet, Refills: 0      traMADol (ULTRAM) 50 mg tablet Take 1 tablet (50 mg total) by mouth every 6 (six) hours as needed for Pain.  Qty: 60 tablet, Refills: 0         CONTINUE these medications which  have NOT CHANGED    Details   furosemide (LASIX) 40 MG tablet 40 mg once daily.       metformin (GLUCOPHAGE) 500 MG tablet Take 500 mg by mouth 2 (two) times daily with meals.      SITagliptin (JANUVIA) 100 MG Tab Take 100 mg by mouth once daily.      allopurinol (ZYLOPRIM) 300 MG tablet Take 300 mg by mouth once daily.       clonazePAM (KLONOPIN) 0.5 MG tablet Take 0.5 mg by mouth as needed for Anxiety. Once      gabapentin (NEURONTIN) 300 MG capsule Take 1 capsule (300 mg total) by mouth 3 (three) times daily. Take 1 cap QHS x 3 days. Increase by 1 cap every 3 days until taking TID.  Qty: 90 capsule, Refills: 11    Associated Diagnoses: Degenerative lumbar spinal stenosis; DDD (degenerative disc disease), lumbar; Lumbar spondylosis      levocetirizine (XYZAL) 5 MG tablet Take 5 mg by mouth as needed.       levothyroxine (SYNTHROID) 100 MCG tablet Take 100 mcg by mouth once daily.       multivitamin (ONE DAILY MULTIVITAMIN) per tablet Take 1 tablet by mouth once daily.      pantoprazole (PROTONIX) 40 MG tablet Take 40 mg by mouth once daily.       simvastatin (ZOCOR) 40 MG tablet Take 40 mg by mouth every evening.         STOP taking these medications       hydrocodone-acetaminophen 5-325mg (NORCO) 5-325 mg per tablet Comments:   Reason for Stopping:               Elizabeth Matta PA-C  Neurosurgery  [facility]

## 2018-03-16 NOTE — PLAN OF CARE
Problem: Occupational Therapy Goal  Goal: Occupational Therapy Goal  Goals to be met by: 3/20/2018    Patient will increase functional independence with ADLs by performing:    Grooming while standing at sink with Modified [place].  Stand pivot transfers with Modified [place].  Toilet transfer to toilet with Modified [place].    Outcome: Ongoing (interventions implemented as appropriate)  OT initial eval completed and treatment initiated. Pt. Requires mod vc for safety; impulsive with transfers and fx mobility; mod vc for adherence to back precautions post op.  Wife and pt educated and instructed in back protection /precautons tech and handout issued.  Pt. Ambulated woith RW in room with SBA-CGA 2/2 impulsive with RW and decreased safety. LB dressing SBA; UB dressing [place] seated EOB; toileting SBA; grooming standing at sink SBA. Gait belt issued to pt/wife and instruction in use.  Pt. Has all necessary DME at home.  Continue with OT POC.

## 2018-03-16 NOTE — PT/OT/SLP EVAL
Occupational Therapy   Evaluation/Treatment    Name: Philip Kam Sr.  MRN: [id number]  Admitting Diagnosis:  Lumbar radiculopathy 1 Day Post-Op    Recommendations:     Discharge Recommendations: home  Discharge Equipment Recommendations:  none  Barriers to discharge:  None    History:     Occupational Profile:  Living Environment: with wife in H with no steps; has walk n shower with built n seat  Previous level of function: indep- Alba ADLS; drivesl ambulated with SPC  Roles and Routines: active  Equipment Owned:  walker, rolling, rollator, cane, straight, bath bench, grab bar, raised toilet (built n bath bench in walk n shower) and wc  Assistance upon Discharge: wife    Past Medical History:   Diagnosis Date    Anxiety     High cholesterol     Hypertension     Hypothyroidism     Type 2 diabetes mellitus, uncontrolled        Past Surgical History:   Procedure Laterality Date    COLONOSCOPY      ESOPHAGUS SURGERY  2012    EYE SURGERY      LUMBAR EPIDURAL INJECTION  01/2018    x2    TONSILLECTOMY         Subjective     Chief Complaint: none  Patient/Family stated goals: home  Communicated with: nurse prior to session.  Pain/Comfort:  · Pain Rating 1: 2/10  · Location - Side 1: Bilateral  · Location - Orientation 1: lower  · Location 1: back  · Pain Addressed 1: Nurse notified, Pre-medicate for activity, Reposition, Distraction  · Pain Rating Post-Intervention 1: 2/10    Patients cultural, spiritual, Congregation conflicts given the current situation: na    Objective:     Patient found with:      General Precautions: Standard, fall   Orthopedic Precautions:spinal precautions   Braces: N/A     Occupational Performance:    Bed Mobility:    · Patient completed Scooting/Bridging with modified independence, with side rail and HOB slightly elevated  · Patient completed Supine to Sit with modified independence, with side rail and HOB slightly elevated    Functional Mobility/Transfers:  · Patient completed Sit <>  Stand Transfer with stand by assistance  with  rolling walker   · Patient completed Bed <> Chair Transfer using Step Transfer technique with stand by assistance with rolling walker  · Patient completed Toilet Transfer Step Transfer technique with stand by assistance with  rolling walker  · Functional Mobility: SBA -CGA with RW in jania to bathroom, trying to walk backwards into bathroom despite education and vc for safety and fall risk; wife present for training with gait belt to assist pt at home with fx mobility from RW.    Activities of Daily Living:  · Feeding:  independence HOB elevated  · Grooming: stand by assistance standing at sink with RW  · UB Dressing: modified independence seated EOB   · LB Dressing: stand by assistance pants, socks and shoes crossed legs to don socks and fasten velcro straps on shoes seated EOB  · Toileting: stand by assistance from toilet     Cognitive/Visual Perceptual:  Cognitive/Psychosocial Skills:     -       Oriented to: Person, Place, Time and Situation   -       Follows Commands/attention:Follows one-step commands  -       Communication: clear/fluent  -       Memory: Poor immediate recall  -       Safety awareness/insight to disability: impaired   -       Mood/Affect/Coping skills/emotional control: Appropriate to situation  Visual/Perceptual:      -Intact    Physical Exam:  Balance:    -       static sit:  good; dynamic sitting:  fair plus; static standing:  fair plus; dynamic standing;  fair  Postural examination/scapula alignment:    -       No postural abnormalities identified  Skin integrity: Visible skin intact  Edema:  None noted  Sensation:    -       Intact  Motor Planning:    -       intact  Dominant hand:    -       right  Upper Extremity Range of Motion:     -       Right Upper Extremity: WFL  Upper Extremity Strength:    -       Right Upper Extremity: WFL  -       Left Upper Extremity: WFL   Strength:    -       Right Upper Extremity: WFL  -       Left Upper  "Extremity: WFL  Fine Motor Coordination:    -       Intact    Patient left up in chair with all lines intact, call button in reach, nurse notified and wife present    [facility] 6 Click:  [facility] Total Score: 20    Treatment & Education:   Pt. Instructed in safety awareness with transfers and fx mobility using RW; mod vc for adherence to back precautions post op and safety awrenes.  Wife and pt educated and instructed in back protection /precautons tech and handout issued.  Pt. Ambulated woith RW in room with SBA-CGA 2/2 impulsive with RW and decreased safety. LB dressing SBA; UB dressing Alba seated EOB; toileting SBA; grooming standing at sink SBA. Gait belt issued to pt/wife and instruction in use.  Pt. Has all necessary DME at home.  Continue with OT POC.    Education:    Assessment:     Philip Kam Gudelia is a 72 y.o. male with a medical diagnosis of Lumbar radiculopathy; s/p Pre-op Diagnosis: Lumbar radiculopathy [M54.16]  Bilateral stenosis of lateral recess of lumbar spine [M48.061] s/p Procedure(s):  LAMINECTOMY-LUMBAR Bilateral L2-3,L4-5 Contralateral Medial Facetectomy Laminectomy and Foraminotomy.  He presents with OT initial eval completed and treatment initiated. Pain 2/10 low back, decreased safety awareness with mobility and decreased carryover with back precautions post op. Recommend home with wife assist.  Gait belt issued and pt has all necessary DME at home. Continue with OT POC.  Performance deficits affecting function are decreased safety awareness, gait instability, impaired functional mobilty, impaired self care skills, decreased lower extremity function.      Rehab Prognosis:  good; patient would benefit from acute skilled OT services to address these deficits and reach maximum level of function.         Clinical Decision Makin.  OT Low:  "Pt evaluation falls under low complexity for evaluation coding due to performance deficits noted in 1-3 areas as stated above and 0 co-morbities " "affecting current functional status. Data obtained from problem focused assessments. No modifications or assistance was required for completion of evaluation. Only brief occupational profile and history review completed."     Plan:     Patient to be seen 5 x/week to address the above listed problems via self-care/home management, therapeutic activities  · Plan of Care Expires: 04/16/18  · Plan of Care Reviewed with: spouse, patient    This Plan of care has been discussed with the patient who was involved in its development and understands and is in agreement with the identified goals and treatment plan    GOALS:    Occupational Therapy Goals        Problem: Occupational Therapy Goal    Goal Priority Disciplines Outcome Interventions   Occupational Therapy Goal     OT, PT/OT Ongoing (interventions implemented as appropriate)    Description:  Goals to be met by: 3/20/2018    Patient will increase functional independence with ADLs by performing:    Grooming while standing at sink with Modified [place].  Stand pivot transfers with Modified [place].  Toilet transfer to toilet with Modified [place].                      Time Tracking:     OT Date of Treatment: 03/16/18  OT Start Time: 1021  OT Stop Time: 1052  OT Total Time (min): 31 min    Billable Minutes:Evaluation 15  Self Care/Home Management 16  Total Time 31    Elizabeth Ramírez OT  3/16/2018    "

## 2018-03-16 NOTE — PLAN OF CARE
"Redden rash observed to entire lower abdomen. Pt stated he had the rash prior to admit to ospital. Stated the rash is from "my back brace I wear at home. It takes afew days to clear up, i've had it before, it's nothing new."  "

## 2018-03-16 NOTE — PLAN OF CARE
Problem: Physical Therapy Goal  Goal: Physical Therapy Goal  Goals to be met by: 18     Patient will increase functional independence with mobility by performin. Sit to stand transfer with Supervision  2. Bed to chair transfer with Stand-by Assistance using Rolling Walker  3. Gait  x 150 feet with Stand-by Assistance using Rolling Walker.   4. Lower extremity exercise program x 10 reps per handout, with supervision    Outcome: Ongoing (interventions implemented as appropriate)  PT evaluation completed, note to follow. Pt with no additional DME needs expected. Will continue to work with pt with IP PT during his IP stay to address his functional mobility deficits.

## 2018-03-16 NOTE — PLAN OF CARE
Problem: Patient Care Overview  Goal: Plan of Care Review  Outcome: Revised  Pt awake, alert, and oriented.  Marie removed at 0907, pt voided within 1 hour.  Pt up with physical therapy and required pain medication just after.  Pt's blood pressure dropped to 95/51, DUNCAN Caban notified.  Bolus'd 1 liter normal saline, blood pressure up to 102/57.  Pt stated that he did not feel weak, light-headed or dizzy, and was cleared for discharge.  Pt instructed on new medications and when/where to follow up after hospital stay.  No acute distress noted, safety maintained.

## 2018-03-16 NOTE — PLAN OF CARE
Problem: Infection, Risk/Actual (Adult)  Goal: Identify Related Risk Factors and Signs and Symptoms  Related risk factors and signs and symptoms are identified upon initiation of Human [employer] Clinical Practice Guideline (CPG)   Outcome: Ongoing (interventions implemented as appropriate)  Continue to monitor site for S/S infection. No redness, warmth, swelling or purulent drainage noted by the end of shift.

## 2018-03-16 NOTE — PT/OT/SLP EVAL
Physical Therapy Evaluation and Treatment    Patient Name:  Philip Kam Sr.   MRN:  [id number]    Recommendations:     Discharge Recommendations:  home   Discharge Equipment Recommendations: none   Barriers to discharge: None    Assessment:     Philip Kam Sr. is a 72 y.o. male admitted with a medical diagnosis of Lumbar radiculopathy.  He presents with the following impairments/functional limitations:  weakness, impaired endurance, impaired self care skills, impaired functional mobilty, gait instability, impaired balance, decreased lower extremity function, pain. PT ambulated 100 ft with RW and CGA/SBA.     Rehab Prognosis: Excellent; patient would benefit from acute skilled PT services to address these deficits and reach maximum level of function.      Recent Surgery: Procedure(s) (LRB):  LAMINECTOMY-LUMBAR Bilateral L2-3,L4-5 Contralateral Medial Facetectomy Laminectomy and Foraminotomy/ (Bilateral) 1 Day Post-Op    Plan:     During this hospitalization, patient to be seen daily to address the above listed problems via gait training, therapeutic activities, therapeutic exercises  · Plan of Care Expires:  04/16/18   Plan of Care Reviewed with: patient, spouse    Subjective     Communicated with CODY Ann prior to session.  Patient found supine with HOB elevated upon PT entry to room, agreeable to evaluation.      Chief Complaint: no complaints  Patient comments/goals: to return home  Pain/Comfort:  · Pain Rating 1: 0/10  · Location - Orientation 1: lower  · Location 1: back (and posterior R knee)  · Pain Addressed 1: Reposition, Distraction, Cessation of Activity, Nurse notified  · Pain Rating Post-Intervention 1: 4/10    Patients cultural, spiritual, Moravian conflicts given the current situation: none reported    Living Environment:  Pt lives with his wife, son, and grandson in a [facility] with no [place] with [facility] with built in bench.  Prior to admission, patients level of function was supervision/mod I with  RW or rollator and had been using a w/c for most mobility 2/2 his back pain.  Patient has the following equipment: walker, rolling, wheelchair, rollator (built in shower bench).  DME owned (not currently used): none.  Upon discharge, patient will have assistance from his family.    Objective:     Patient found with: peripheral IV     General Precautions: Standard, fall   Orthopedic Precautions:spinal precautions   Braces: N/A     Exams:  · Cognitive Exam:  Patient is oriented to Person, Place, Time and Situation and follows 100% of 1-step commands   · Fine Motor Coordination:    · -       Intact  · Gross Motor Coordination:  WFL  · Sensation:    · -       Intact  · Skin Integrity/Edema:      · -       Skin integrity: surgical incision on back  · RLE ROM: WFL except ankle DF to neutral  · RLE Strength: WFL except ankle DF 4-/5  · LLE ROM: WFL except ankle DF to neutral  · LLE Strength: WFL except ankle DF 4-/5    Functional Mobility:  · Bed Mobility:     · Rolling Left:  supervision  · Supine to Sit: supervision  · Sit to Supine: supervision  · Transfers:     · Sit to Stand:  stand by assistance with rolling walker  · Gait: 100 ft with RW and CGA with brief periods of SBA. Verbal cues for improved posture and proximity to RW and pt able to improve.    AM-PAC 6 CLICK MOBILITY  Total Score:17       Therapeutic Activities and Exercises:  Pt participated in gait training as reported above. Pt returned to supine with HOB elevated and was instructed in BLE supine exercises x 5 reps: APs, heel slides, hip abduction slides, QS, and GS.    Patient left HOB elevated with call button in reach, bed alarm on, RN notified and pt's wife present.    GOALS:    Physical Therapy Goals        Problem: Physical Therapy Goal    Goal Priority Disciplines Outcome Goal Variances Interventions   Physical Therapy Goal     PT/OT, PT Ongoing (interventions implemented as appropriate)     Description:  Goals to be met by: 4/16/18     Patient will  increase functional independence with mobility by performin. Sit to stand transfer with Supervision  2. Bed to chair transfer with Stand-by Assistance using Rolling Walker  3. Gait  x 150 feet with Stand-by Assistance using Rolling Walker.   4. Lower extremity exercise program x 10 reps per handout, with supervision                      History:     Past Medical History:   Diagnosis Date    Anxiety     High cholesterol     Hypertension     Hypothyroidism     Type 2 diabetes mellitus, uncontrolled        Past Surgical History:   Procedure Laterality Date    COLONOSCOPY      ESOPHAGUS SURGERY      EYE SURGERY      LUMBAR EPIDURAL INJECTION  01/2018    x2    TONSILLECTOMY         Clinical Decision Making:     History  Co-morbidities and personal factors that may impact the plan of care Examination  Body Structures and Functions, activity limitations and participation restrictions that may impact the plan of care Clinical Presentation   Decision Making/ Complexity Score   Co-morbidities:   [] Time since onset of injury / illness / exacerbation  [] Status of current condition  []Patient's cognitive status and safety concerns    [] Multiple Medical Problems (see med hx)  Personal Factors:   [] Patient's age  [] Prior Level of function   [] Patient's home situation (environment and family support)  [] Patient's level of motivation  [] Expected progression of patient      HISTORY:(criteria)    [x] 23018 - no personal factors/history    [] 63495 - has 1-2 personal factor/comorbidity     [] 80110 - has >3 personal factor/comorbidity     Body Regions:  [] Objective examination findings  [] Head     []  Neck  [x] Trunk   [] Upper Extremity  [] Lower Extremity    Body Systems:  [] For communication ability, affect, cognition, language, and learning style: the assessment of the ability to make needs known, consciousness, orientation (person, place, and time), expected emotional /behavioral responses, and  learning preferences (eg, learning barriers, education  needs)  [x] For the neuromuscular system: a general assessment of gross coordinated movement (eg, balance, gait, locomotion, transfers, and transitions) and motor function  (motor control and motor learning)  [x] For the musculoskeletal system: the assessment of gross symmetry, gross range of motion, gross strength, height, and weight  [] For the integumentary system: the assessment of pliability(texture), presence of scar formation, skin color, and skin integrity  [] For cardiovascular/pulmonary system: the assessment of heart rate, respiratory rate, blood pressure, and edema     Activity limitations:    [] Patient's cognitive status and saf ety concerns          [] Status of current condition      [] Weight bearing restriction  [] Cardiopulmunary Restriction    Participation Restrictions:   [] Goals and goal agreement with the patient     [] Rehab potential (prognosis) and probable outcome      Examination of Body System: (criteria)    [x] 44429 - addressing 1-2 elements    [] 33507 - addressing a total of 3 or more elements     [] 40370 -  Addressing a total of 4 or more elements         Clinical Presentation: (criteria)  Stable - 59608     On examination of body system using standardized tests and measures patient presents with 1-2 elements from any of the following: body structures and functions, activity limitations, and/or participation restrictions.  Leading to a clinical presentation that is considered stable and/or uncomplicated                              Clinical Decision Making  (Eval Complexity):  Low- 73787     Time Tracking:     PT Received On: 03/16/18  PT Start Time: 0923     PT Stop Time: 0947  PT Total Time (min): 24 min     Billable Minutes: Evaluation 15 and Therapeutic Exercise 9      Meme Hood, PT  03/16/2018

## 2018-03-16 NOTE — PROGRESS NOTES
Patient here for o/p procedure.      Future Appointments  Date Time Provider Department Center   4/2/2018 11:00 AM Elizabeth Matta PA-C [facility]UROSU [place] [facility]   4/9/2018 3:00 PM Murray Schmidt MD [facility] CARDIO [place]   4/23/2018 11:30 AM Riley Diaz MD [facility] NEUROSU [place] [facility]       -POD 1 s/p L2-3 bilateral laminectomy, medial facetectomy and a bilateral L4-5  laminectomy, medial facetectomy and contralateral foraminotomy  -Neurologically intact  -Pain controlled  -Discontinue ackerman this morning. Monitor urine output   -PT. Appreciate recs      DISPO: discharge home today pending PT recs and voiding independently        Update:   pt has no HH or DME reccs/needs.

## 2018-03-16 NOTE — DISCHARGE INSTRUCTIONS
Patient Information  -No driving while taking narcotic pain medications  -Do not take any OTC products containing acetaminophen at the same time as you take your narcotic pain medication. Medications that may contain acetaminophen include but are not limited to: Excedrin and other headache medications, arthritis medications, cold and sinus medications, etc. Please review the list of active ingredients on any OTC medication prior to taking it.  -Do not take any Aspirin or Aspirin containing products until 48 hours after surgery   -Do not take any Aleeve, Naprosyn, Naproxen, Ibuprofen, Advil or any other NSAID for 6 weeks.   -Do not consume any alcoholic beverages until released by your Neurosurgeon  -Do not perform any excessive bending over or leaning forward as this is a fall hazard.  -Do not perform any heavy lifting or lifting more than 10 lbs from the ground level as this is a fall hazard.    Contact the Neurosurgery Office immediately if:  -If you begin to notice any neurologic changes such as:           -Sudden onset of lethargy or sleepiness           -Sudden confusion, trouble speaking, or understanding            -Sudden trouble seeing in one or both eyes            -Sudden trouble walking, dizziness, loss of coordination            -Sudden severe headache with no known cause            -Sudden onset of numbness or weakness     Wound Care:  Remove bandage on the 2nd day after surgery, then keep your incision open to air. There are white tape strips called steri strips under your bandage. These will fall off on their own. Do not remove them. You may shower on Day 2. Have the stream of water hit you opposite from the incision. Do not scrub the incision. Pat the incision dry after your shower. You cannot take a bath/swim/submerge the incision until 8 weeks after surgery.    Call your doctor or go to the Emergency Room for any signs of infection including: increased redness, drainage, pain or fever (temperature  greater than or equal to 101.4).       Miscellaneous:  -Follow up with Neurosurgery in 2 weeks for wound check  -Remain active with walking and other light activities daily.  No heavy lifting, no extreme bending or twisting.          Neurosurgery Office:   [address], [address]  [place] [address]  Telephone [phone]

## 2018-03-16 NOTE — PROGRESS NOTES
[facility]  Neurosurgery  Progress Note    Subjective:     History of Present Illness: Philip Kam Sr. is a 72 y.o. male with moderate to severe spinal stenosis at L2-3 and severe bilateral L4-5 foraminal stenosis, worsening difficulty walking and bilateral leg pain with bilateral foot dorsiflexion weakness that were refractory to conservative management. Dr. Diaz recommended a L2-3 bilateral laminectomy, medial facetectomy and a bilateral L4-5  laminectomy, medial facetectomy and contralateral foraminotomy. The risks of the procedure include hemorrhage, infection, paralysis, loss of sensation, loss of sphincter functions, death and postoperative medical complication. Patient understands the risks and would like to proceed with surgery.    Post-Op Info:  Procedure(s) (LRB):  LAMINECTOMY-LUMBAR Bilateral L2-3,L4-5 Contralateral Medial Facetectomy Laminectomy and Foraminotomy/ (Bilateral)   1 Day Post-Op      Interval History: POD 1. NAEON. Marie placed prior to surgery to be removed this morning. No complaints this morning. Tolerating diet.     Medications:  Continuous Infusions:   0/9% NACL & POTASSIUM CHLORIDE 20 MEQ/L 75 mL/hr at 03/15/18 1714     Scheduled Meds:   acetaminophen  1,000 mg Oral Q8H    allopurinol  300 mg Oral Daily    celecoxib  100 mg Oral BID    clonazePAM  0.5 mg Oral Daily    furosemide  40 mg Oral Daily    gabapentin  300 mg Oral TID    heparin (porcine)  5,000 Units Subcutaneous Q8H    levothyroxine  100 mcg Oral Before breakfast    pantoprazole  40 mg Oral Daily    simvastatin  40 mg Oral QHS    traMADol  50 mg Oral Q6H     PRN Meds:aluminum-magnesium hydroxide-simethicone, cloNIDine, cyclobenzaprine, dextrose 50%, dextrose 50%, glucagon (human recombinant), glucose, glucose, insulin aspart U-100, ondansetron, oxyCODONE, senna-docusate 8.6-50 mg     Review of Systems  Objective:     Weight: 117.4 kg (258 lb 13.1 oz)  Body mass index is 39.35  kg/m².  Vital Signs (Most Recent):  Temp: 97.8 °F (36.6 °C) (03/16/18 0750)  Pulse: 60 (03/16/18 0750)  Resp: 20 (03/16/18 0750)  BP: (!) 129/59 (03/16/18 0750)  SpO2: 98 % (03/16/18 0502) Vital Signs (24h Range):  Temp:  [96.7 °F (35.9 °C)-98.7 °F (37.1 °C)] 97.8 °F (36.6 °C)  Pulse:  [] 60  Resp:  [12-20] 20  SpO2:  [95 %-100 %] 98 %  BP: (114-198)/(59-95) 129/59            Urethral Catheter 03/15/18 1037 Non-latex 16 Fr. (Active)   Site Assessment Clean;Intact 3/15/2018  7:52 PM   Collection Container Urimeter 3/15/2018  7:52 PM   Securement Method secured to top of thigh w/ adhesive device 3/15/2018  7:52 PM   Catheter Care Performed yes 3/15/2018  7:52 PM   Output (mL) 1450 mL 3/16/2018  5:48 AM       Neurosurgery Physical Exam   General: well developed, well nourished, no distress.   Neurologic: Alert and oriented. Thought content appropriate.  GCS: Motor: 6/Verbal: 5/Eyes: 4 GCS Total: 15  Mental Status: Awake, Alert, Oriented x 4  Language: No aphasia  Speech: No dysarthria  Cranial nerves: face symmetric, tongue midline, CN II-XII grossly intact.   Head: normocephalic, atraumatic  Eyes: EOMI.  Neck: trachea midline. No JVD  Cardiovascular: No LE edema   Pulmonary: normal respirations, no signs of respiratory distress  Sensory: intact to light touch throughout  Skin: Skin is warm, dry and intact.  Motor Strength: Moves all extremities spontaneously with good tone.  5/5 BLUE  Wound: dressings clean, dry, intact     Significant Labs:  No results for input(s): GLU, NA, K, CL, CO2, BUN, CREATININE, CALCIUM, MG in the last 48 hours.  No results for input(s): WBC, HGB, HCT, PLT in the last 48 hours.  No results for input(s): LABPT, INR, APTT in the last 48 hours.  Microbiology Results (last 7 days)     ** No results found for the last 168 hours. **          Significant Diagnostics:  No new imaging     Assessment/Plan:     Active Diagnoses:    Diagnosis Date Noted POA    PRINCIPAL PROBLEM:  Lumbar  radiculopathy [M54.16] 03/15/2018 Yes    Ackerman catheter problem [T83.9XXA] 03/15/2018 No      Problems Resolved During this Admission:    Diagnosis Date Noted Date Resolved POA     -POD 1 s/p L2-3 bilateral laminectomy, medial facetectomy and a bilateral L4-5  laminectomy, medial facetectomy and contralateral foraminotomy  -Neurologically intact  -Pain controlled  -Discontinue ackerman this morning. Monitor urine output   -PT. Appreciate recs     DISPO: discharge home today pending PT recs and voiding independently       Elizabeth Matta PA-C  Neurosurgery  [facility]

## 2018-03-16 NOTE — PLAN OF CARE
Dressing to back incisions noted dry and intact. Old dried drainage noted x1 dressing. No active bleeding noted.

## 2018-04-02 ENCOUNTER — OFFICE VISIT (OUTPATIENT)
Dept: NEUROSURGERY | Facility: CLINIC | Age: 73
End: 2018-04-02
Payer: MEDICARE

## 2018-04-02 VITALS
SYSTOLIC BLOOD PRESSURE: 159 MMHG | WEIGHT: 235 LBS | DIASTOLIC BLOOD PRESSURE: 90 MMHG | HEART RATE: 86 BPM | BODY MASS INDEX: 35.73 KG/M2

## 2018-04-02 DIAGNOSIS — Z98.890 S/P LAMINECTOMY: Primary | ICD-10-CM

## 2018-04-02 PROCEDURE — 99024 POSTOP FOLLOW-UP VISIT: CPT | Mod: S$GLB,,, | Performed by: PHYSICIAN ASSISTANT

## 2018-04-02 PROCEDURE — 99999 PR PBB SHADOW E&M-EST. PATIENT-LVL III: CPT | Mod: PBBFAC,,, | Performed by: PHYSICIAN ASSISTANT

## 2018-04-02 RX ORDER — TIZANIDINE 2 MG/1
2 TABLET ORAL EVERY 8 HOURS PRN
Qty: 90 TABLET | Refills: 0 | Status: SHIPPED | OUTPATIENT
Start: 2018-04-02 | End: 2018-04-12

## 2018-04-02 RX ORDER — TRAMADOL HYDROCHLORIDE 50 MG/1
50 TABLET ORAL EVERY 6 HOURS PRN
Qty: 60 TABLET | Refills: 0 | Status: SHIPPED | OUTPATIENT
Start: 2018-04-02 | End: 2018-04-12

## 2018-04-02 NOTE — PROGRESS NOTES
[facility] - Neurosurgery  Progress Note      SUBJECTIVE:     Chief Complaint/Reason for Visit: 2 week post op follow up     History of Present Illness:  Philip Kam Sr. is a 72 y.o. male who is 2 weeks status post Bilateral L2-3,L4-5 Contralateral Medial Facetectomy Laminectomy and Foraminotomy for treatment of worsening difficulty walking and bilateral leg pain with bilateral foot dorsiflexion weakness that were refractory to conservative management. Patient reports improvement in leg pain. His walking is improving. He reports low back pain at site of surgery. This is controlled with pain medication. He ran out at the end of last week.       Low Back Pain Scale  R Low Back-Pain Score: 5  R Low Back-Pain Intensity: Pain killers give moderate relief from pain  R Low Back-Pain Score: I need help every day in most aspects of self care  Low Back-Lifting: Pain prevents me from lifting heavy weights off the floor, but I can manage if they are conveniently positioned for example on a table   Low Back-Walking: Pain prevents me walking more than 1 mile   Low Back-Sitting: I can sit only in my favorite chair as long as I like   Low Back-Standing: I cannot stand for longer than 10 minutes with increasing pain   Low Back-Sleeping: Because of pain my normal nights sleep is reduced by less than one quarter   Low Back-Social Life: Pain has no significant effect on my social life apart from limiting my more en   Low Back-Traveling: I have some pain when traveling but rene of my usual forms of travel make it any worse   Low Back-Changing Degree of Pain: My pain seems to be getting better but improvement is slow           Review of patient's allergies indicates:   Allergen Reactions    Codeine Itching         OBJECTIVE:     Vital Signs (Most Recent):      Vitals:    04/02/18 1101   BP: (!) 159/90   Pulse: 86         Physical Exam:  General: well developed, well nourished, no distress  Neurologic: Alert and oriented. Thought  content appropriate.   GCS: Motor: 6/Verbal: 5/Eyes: 4 GCS Total: 15   Mental Status: Awake, Alert, Oriented x3   Cranial nerves: face symmetric, tongue midline, pupils equal, round, reactive to light, EOMI.   Motor Strength: moves all extremities with good strength and tone 5-/5 left dorsiflexion, 4+/5 right dorsiflexion  Sensation: response to light touch throughout  No gait disturbances   Incision is clean, dry and intact with no signs of erythema, swelling or purulent drainage. [place]/Dissolvable sutures are intact. All skin edges are completely approximated.       Diagnostic Results:  N/A    ASSESSMENT/PLAN:     72 y.o. male 2 weeks s/p Bilateral L2-3,L4-5 Contralateral Medial Facetectomy Laminectomy and Foraminotomy      - Follow up in 4 weeks with Dr. Diaz  - Refill tramadol and zanaflex   - Keep incision open to air.  - Can shower and get incision wet, just pat dry and no vigorous scrubbing. Do not submerge incision for another 6 weeks.   - No lifting more than 10 lbs or excessive bending/twisting.   - Encouraged patient to call if they have any questions or concerns prior to next follow up appt.      Elizabeth Matta PA-C  Neurosurgery

## 2018-04-09 ENCOUNTER — OFFICE VISIT (OUTPATIENT)
Dept: CARDIOLOGY | Facility: CLINIC | Age: 73
End: 2018-04-09
Payer: MEDICARE

## 2018-04-09 VITALS — SYSTOLIC BLOOD PRESSURE: 146 MMHG | OXYGEN SATURATION: 98 % | DIASTOLIC BLOOD PRESSURE: 82 MMHG | HEART RATE: 90 BPM

## 2018-04-09 DIAGNOSIS — I51.89 DIASTOLIC DYSFUNCTION WITHOUT HEART FAILURE: Primary | ICD-10-CM

## 2018-04-09 DIAGNOSIS — E78.2 MIXED HYPERLIPIDEMIA: ICD-10-CM

## 2018-04-09 DIAGNOSIS — I49.9 CARDIAC ARRHYTHMIA, UNSPECIFIED CARDIAC ARRHYTHMIA TYPE: ICD-10-CM

## 2018-04-09 DIAGNOSIS — I10 HTN (HYPERTENSION), BENIGN: ICD-10-CM

## 2018-04-09 PROCEDURE — 99999 PR PBB SHADOW E&M-EST. PATIENT-LVL III: CPT | Mod: PBBFAC,,, | Performed by: INTERNAL MEDICINE

## 2018-04-09 PROCEDURE — 3077F SYST BP >= 140 MM HG: CPT | Mod: CPTII,S$GLB,, | Performed by: INTERNAL MEDICINE

## 2018-04-09 PROCEDURE — 99214 OFFICE O/P EST MOD 30 MIN: CPT | Mod: S$GLB,,, | Performed by: INTERNAL MEDICINE

## 2018-04-09 PROCEDURE — 3079F DIAST BP 80-89 MM HG: CPT | Mod: CPTII,S$GLB,, | Performed by: INTERNAL MEDICINE

## 2018-04-09 NOTE — PROGRESS NOTES
Subjective:   Patient ID:  Philip Kam Sr. is a 72 y.o. male who presents for follow-up of No chief complaint on file.      Problem List Items Addressed This Visit        Cardiac/Vascular    Mixed hyperlipidemia    HTN (hypertension), benign    Diastolic dysfunction without heart failure - Primary    Arrhythmia    Relevant Orders    Holter monitor - 48 hour       Endocrine    BMI 36.0-36.9,adult          HPI: Patient with HTN controlled on diet, HLD and DD present as f/u after lumbar back surgery. On previous visit there was concern for Atrial fib. ECG from PCP office had multiple artifacts however. Repeat ECG showed sinus rhythm. No chest pain, dyspnea or palpitations. Repeat /82.     Review of Systems   Constitution: Negative.   HENT: Negative.    Eyes: Negative.    Cardiovascular: Negative.    Respiratory: Negative.    Endocrine: Negative.    Hematologic/Lymphatic: Negative.    Skin: Negative.    Musculoskeletal: Negative.    Gastrointestinal: Negative.    Neurological: Negative.    Psychiatric/Behavioral: Negative.        Patient's Medications   New Prescriptions    No medications on file   Previous Medications    ALLOPURINOL (ZYLOPRIM) 300 MG TABLET    Take 300 mg by mouth once daily.     CLONAZEPAM (KLONOPIN) 0.5 MG TABLET    Take 0.5 mg by mouth as needed for Anxiety. Once    FUROSEMIDE (LASIX) 40 MG TABLET    40 mg once daily.     GABAPENTIN (NEURONTIN) 300 MG CAPSULE    Take 1 capsule (300 mg total) by mouth 3 (three) times daily. Take 1 cap QHS x 3 days. Increase by 1 cap every 3 days until taking TID.    LEVOCETIRIZINE (XYZAL) 5 MG TABLET    Take 5 mg by mouth as needed.     LEVOTHYROXINE (SYNTHROID) 100 MCG TABLET    Take 100 mcg by mouth once daily.     METFORMIN (GLUCOPHAGE) 500 MG TABLET    Take 500 mg by mouth 2 (two) times daily with meals.    MULTIVITAMIN (ONE DAILY MULTIVITAMIN) PER TABLET    Take 1 tablet by mouth once daily.    PANTOPRAZOLE (PROTONIX) 40 MG TABLET    Take 40 mg by  mouth once daily.     SENNA-DOCUSATE 8.6-50 MG (PERICOLACE) 8.6-50 MG PER TABLET    Take 2 tablets by mouth nightly as needed for Constipation.    SIMVASTATIN (ZOCOR) 40 MG TABLET    Take 40 mg by mouth every evening.    SITAGLIPTIN (JANUVIA) 100 MG TAB    Take 100 mg by mouth once daily.    TIZANIDINE (ZANAFLEX) 2 MG TABLET    Take 1 tablet (2 mg total) by mouth every 8 (eight) hours as needed (muscle spasms).    TRAMADOL (ULTRAM) 50 MG TABLET    Take 1 tablet (50 mg total) by mouth every 6 (six) hours as needed for Pain.   Modified Medications    No medications on file   Discontinued Medications    No medications on file       Objective:   Physical Exam   Constitutional: He is oriented to person, place, and time. He appears well-developed and well-nourished. No distress.   Examination of the digits showed no clubbing or cyanosis   HENT:   Head: Normocephalic and atraumatic.   Eyes: Conjunctivae are normal. Pupils are equal, round, and reactive to light. Right eye exhibits no discharge.   Neck: Normal range of motion. Neck supple. No JVD present. No thyromegaly present.   No carotid bruits   Cardiovascular: Normal rate, regular rhythm, S1 normal, S2 normal, normal heart sounds, intact distal pulses and normal pulses.  PMI is not displaced.  Exam reveals no gallop, no friction rub and no opening snap.    No murmur heard.  Pulmonary/Chest: Effort normal and breath sounds normal. No respiratory distress. He has no wheezes. He has no rales. He exhibits no tenderness.   Abdominal: Soft. Bowel sounds are normal. He exhibits no distension and no mass. There is no tenderness. There is no guarding.   No hepatosplenomegaly   Musculoskeletal: Normal range of motion. He exhibits no edema or tenderness.   Lymphadenopathy:     He has no cervical adenopathy.   Neurological: He is alert and oriented to person, place, and time.   Skin: Skin is warm. No rash noted. He is not diaphoretic. No erythema.   Psychiatric: He has a normal  mood and affect.   Nursing note and vitals reviewed.      ECGs reviewed-NSR  LABS reviewed  Imaging including Echoes reviewed-normal ef with DD    Assessment:     1. Diastolic dysfunction without heart failure    2. Mixed hyperlipidemia    3. HTN (hypertension), benign    4. BMI 36.0-36.9,adult    5. Cardiac arrhythmia, unspecified cardiac arrhythmia type        Plan:     Holter monitor to rule out atrial fib. No signs of atrial fib  Continue current medication  Weight loss  Pain control  F/u in 6 months. Phone review of results.       Clinic time spent with patient discussing and treating medical condition including reviewing images, ECG, labs and medications > 20 minutes.

## 2018-04-12 ENCOUNTER — HOSPITAL ENCOUNTER (EMERGENCY)
Facility: HOSPITAL | Age: 73
Discharge: HOME | End: 2018-04-12
Attending: EMERGENCY MEDICINE
Payer: MEDICARE

## 2018-04-12 VITALS
TEMPERATURE: 98 F | DIASTOLIC BLOOD PRESSURE: 72 MMHG | RESPIRATION RATE: 15 BRPM | OXYGEN SATURATION: 97 % | SYSTOLIC BLOOD PRESSURE: 157 MMHG | BODY MASS INDEX: 35.57 KG/M2 | HEART RATE: 73 BPM | HEIGHT: 68 IN | WEIGHT: 234.69 LBS

## 2018-04-12 DIAGNOSIS — I10 HYPERTENSION, UNSPECIFIED TYPE: Primary | ICD-10-CM

## 2018-04-12 PROCEDURE — 25000003 PHARM REV CODE 250: Performed by: EMERGENCY MEDICINE

## 2018-04-12 PROCEDURE — 99283 EMERGENCY DEPT VISIT LOW MDM: CPT

## 2018-04-12 RX ORDER — CLONIDINE HYDROCHLORIDE 0.1 MG/1
0.1 TABLET ORAL
Status: COMPLETED | OUTPATIENT
Start: 2018-04-12 | End: 2018-04-12

## 2018-04-12 RX ADMIN — CLONIDINE HYDROCHLORIDE 0.1 MG: 0.1 TABLET ORAL at 06:04

## 2018-04-13 NOTE — ED PROVIDER NOTES
"Encounter Date: 4/12/2018       History     Chief Complaint   Patient presents with    Hypertension     c/o high blood pressure for past 2 days with headache. States BP at home 199/119. Denies any dizziness or chest pain.      The history is provided by the patient.   Hypertension    This is a new problem. The current episode started today. The problem has been unchanged. Associated symptoms include headaches. Pertinent negatives include no chest pain, no orthopnea, no palpitations, no PND, no anxiety, no confusion, no malaise/fatigue, no blurred vision, no tinnitus, no neck pain, no peripheral edema, no dizziness and no shortness of breath. There are no associated agents to hypertension. Risk factors include being male.     Review of patient's allergies indicates:   Allergen Reactions    Codeine Itching     Past Medical History:   Diagnosis Date    Anxiety     High cholesterol     Hypertension     Hypothyroidism     Type 2 diabetes mellitus, uncontrolled      Past Surgical History:   Procedure Laterality Date    COLONOSCOPY      ESOPHAGUS SURGERY  2012    EYE SURGERY      LUMBAR EPIDURAL INJECTION  01/2018    x2    TONSILLECTOMY       Family History   Problem Relation Age of Onset    Heart attack Son      Social History   Substance Use Topics    Smoking status: Never Smoker    Smokeless tobacco: Never Used    Alcohol use 0.6 oz/week     1 Cans of beer per week      Comment: Everyother day_Pt stated,"I drink a few beers."     Review of Systems   Constitutional: Negative for malaise/fatigue.   HENT: Negative for tinnitus.    Eyes: Negative for blurred vision.   Respiratory: Negative for shortness of breath.    Cardiovascular: Negative for chest pain, palpitations, orthopnea and PND.   Musculoskeletal: Negative for neck pain.   Neurological: Positive for headaches. Negative for dizziness.   Psychiatric/Behavioral: Negative for confusion.   All other systems reviewed and are negative.      Physical Exam "     Initial Vitals [04/12/18 1833]   BP Pulse Resp Temp SpO2   (!) 225/100 86 18 98.4 °F (36.9 °C) 98 %      MAP       141.67         Physical Exam    Nursing note and vitals reviewed.  Constitutional: He appears well-developed and well-nourished.   HENT:   Head: Normocephalic and atraumatic.   Eyes: Conjunctivae and EOM are normal.   Neck: Normal range of motion. Neck supple.   Cardiovascular: Normal rate, regular rhythm, normal heart sounds and intact distal pulses. Exam reveals no gallop.    No murmur heard.  Pulmonary/Chest: Breath sounds normal. No respiratory distress. He has no wheezes. He has no rhonchi. He has no rales. He exhibits no tenderness.   Abdominal: Soft. He exhibits no distension and no mass. There is no tenderness. There is no rebound and no guarding.   Musculoskeletal: Normal range of motion.   Neurological: He is alert and oriented to person, place, and time.   Skin: Skin is warm and dry. Capillary refill takes less than 2 seconds.   Psychiatric: He has a normal mood and affect. His behavior is normal. Judgment and thought content normal.         ED Course   Procedures  Labs Reviewed - No data to display          Medical Decision Making:   ED Management:  Patient responded well to clonidine by mouth                      Clinical Impression:   The encounter diagnosis was Hypertension, unspecified type.    Disposition:   Disposition: Discharged  Condition: Stable                        Xochitl Rondon MD  04/12/18 2037

## 2018-04-13 NOTE — ED NOTES
Patient and spouse provided D/C instructions on lowering BP.  Patient instructed to follow up with PCP in regards to establishing a medication if BP continues to run high.  Patient and spouse educated on application and use of halter monitor.  Patient acknowledge D/C teachings.  All questions and concerns addressed. Patient ready for D/C home.  Steady gait noted on D/C.

## 2018-04-19 ENCOUNTER — HOSPITAL ENCOUNTER (OUTPATIENT)
Dept: CARDIOLOGY | Facility: HOSPITAL | Age: 73
Discharge: HOME | End: 2018-04-19
Attending: INTERNAL MEDICINE
Payer: MEDICARE

## 2018-04-19 DIAGNOSIS — I49.9 CARDIAC ARRHYTHMIA, UNSPECIFIED CARDIAC ARRHYTHMIA TYPE: ICD-10-CM

## 2018-04-19 PROCEDURE — 93225 XTRNL ECG REC<48 HRS REC: CPT

## 2018-04-19 PROCEDURE — 93227 XTRNL ECG REC<48 HR R&I: CPT | Mod: ,,, | Performed by: INTERNAL MEDICINE

## 2018-04-24 ENCOUNTER — OFFICE VISIT (OUTPATIENT)
Dept: NEUROSURGERY | Facility: CLINIC | Age: 73
End: 2018-04-24
Payer: MEDICARE

## 2018-04-24 VITALS — DIASTOLIC BLOOD PRESSURE: 84 MMHG | SYSTOLIC BLOOD PRESSURE: 139 MMHG | HEART RATE: 74 BPM

## 2018-04-24 DIAGNOSIS — Z98.890 S/P LAMINECTOMY: Primary | ICD-10-CM

## 2018-04-24 PROCEDURE — 99024 POSTOP FOLLOW-UP VISIT: CPT | Mod: S$GLB,,, | Performed by: NEUROLOGICAL SURGERY

## 2018-04-24 PROCEDURE — 99999 PR PBB SHADOW E&M-EST. PATIENT-LVL III: CPT | Mod: PBBFAC,,, | Performed by: NEUROLOGICAL SURGERY

## 2018-04-24 NOTE — PROGRESS NOTES
NEUROSURGICAL POST-OPERATIVE PROGRESS NOTE    DATE OF SERVICE:  04/24/2018      ATTENDING PHYSICIAN:  Riley Diaz MD    SUBJECTIVE:    INTERIM HISTORY:    This is a very pleasant 72 y.o. y.o. male, who is status L2-3 laminectomy, and bilateral L4-5 foraminotomy 6 weeks ago. Reports complete resolution of his leg pain. Can walk more straight. Has become more active. Still has slight dorsiflexion weakness bilaterally.  Takes tylenol and occasionally muscle relaxants.     Low Back Pain Scale  R Low Back-Pain Score: 2  R Low Back-Pain Intensity: The pain is bad, but I manage without taking pain killers  R Low Back-Pain Score: I can look after myself normally but it causes extra pain  Low Back-Lifting: I can only lift very light weights   Low Back-Walking: Pain prevents me walking more than 1 mile   Low Back-Sitting: I can sit in any chair as long as I like   Low Back-Standing: I cannot stand for longer than 1 hour without increasing pain   Low Back-Sleeping: Because of pain my normal nights sleep is reduced by less than one quarter   Low Back-Social Life: My social life is normal but it increases the degree of pain   Low Back-Traveling: I have no pain when traveling   Low Back-Changing Degree of Pain: My pain is gradually worsening         OBJECTIVE:    PHYSICAL EXAMINATION:   Vitals:    04/24/18 1214   BP: 139/84   Pulse: 74       Neurosurgery Physical Exam    Ortho Exam    Neurologic Exam     Motor Exam     Strength   Right anterior tibial: 4/5  Left anterior tibial: 4/5      Wound has healed.    DIAGNOSTIC DATA:    na    ASSESMENT:    This is a 72 y.o. male who is s/p L2-3 laminectomy and bilateral L4-5 microforaminotomy with complete resolution of the L4 radiculopathy.     PLAN:    OK to resume gym activities  FU in 6 weeks        Riley Diaz MD  Pager: [phone]

## 2018-04-26 ENCOUNTER — TELEPHONE (OUTPATIENT)
Dept: CARDIOLOGY | Facility: CLINIC | Age: 73
End: 2018-04-26

## 2018-04-26 NOTE — TELEPHONE ENCOUNTER
Message left Informing patient that no atrial fib was seen on holter. He has a lot of extra beats that we will observe for now.

## 2018-10-29 ENCOUNTER — OFFICE VISIT (OUTPATIENT)
Dept: CARDIOLOGY | Facility: CLINIC | Age: 73
End: 2018-10-29
Payer: MEDICARE

## 2018-10-29 VITALS
DIASTOLIC BLOOD PRESSURE: 84 MMHG | BODY MASS INDEX: 36.39 KG/M2 | HEART RATE: 83 BPM | SYSTOLIC BLOOD PRESSURE: 129 MMHG | HEIGHT: 68 IN | OXYGEN SATURATION: 97 % | WEIGHT: 240.13 LBS

## 2018-10-29 DIAGNOSIS — E78.2 MIXED HYPERLIPIDEMIA: ICD-10-CM

## 2018-10-29 DIAGNOSIS — I10 HTN (HYPERTENSION), BENIGN: Primary | ICD-10-CM

## 2018-10-29 DIAGNOSIS — I51.89 DIASTOLIC DYSFUNCTION WITHOUT HEART FAILURE: ICD-10-CM

## 2018-10-29 PROCEDURE — 99213 OFFICE O/P EST LOW 20 MIN: CPT | Mod: PBBFAC,PO | Performed by: INTERNAL MEDICINE

## 2018-10-29 PROCEDURE — 99214 OFFICE O/P EST MOD 30 MIN: CPT | Mod: S$PBB,,, | Performed by: INTERNAL MEDICINE

## 2018-10-29 PROCEDURE — 3074F SYST BP LT 130 MM HG: CPT | Mod: CPTII,,, | Performed by: INTERNAL MEDICINE

## 2018-10-29 PROCEDURE — 3079F DIAST BP 80-89 MM HG: CPT | Mod: CPTII,,, | Performed by: INTERNAL MEDICINE

## 2018-10-29 PROCEDURE — 1101F PT FALLS ASSESS-DOCD LE1/YR: CPT | Mod: CPTII,,, | Performed by: INTERNAL MEDICINE

## 2018-10-29 PROCEDURE — 99999 PR PBB SHADOW E&M-EST. PATIENT-LVL III: CPT | Mod: PBBFAC,,, | Performed by: INTERNAL MEDICINE

## 2018-10-29 RX ORDER — TEMAZEPAM 22.5 MG/1
30 CAPSULE ORAL NIGHTLY PRN
Status: ON HOLD | COMMUNITY
End: 2021-03-14

## 2018-10-29 RX ORDER — LORAZEPAM 0.5 MG/1
0.5 TABLET ORAL EVERY 6 HOURS PRN
COMMUNITY

## 2018-10-29 NOTE — PROGRESS NOTES
Subjective:   Patient ID:  Philip Kam Sr. is a 73 y.o. male who presents for follow-up of Follow-up      Problem List Items Addressed This Visit        Cardiac/Vascular    Mixed hyperlipidemia    HTN (hypertension), benign - Primary    Diastolic dysfunction without heart failure       Endocrine    BMI 36.0-36.9,adult          HPI: Patient with HTN controlled on diet, HLD and DD present as f/uHe is doing well since previous visit and remain back pain free after surgery. Holter showed no Atrial fib. He is denies chest pain, dyspnea, orthopnea or PND. BP and HR is controlled. He is compliant with medications.   He is trying to return to being active.     Review of Systems   Constitution: Negative.   HENT: Negative.    Eyes: Negative.    Cardiovascular: Negative.    Respiratory: Negative.    Endocrine: Negative.    Hematologic/Lymphatic: Negative.    Skin: Negative.    Musculoskeletal: Negative.    Gastrointestinal: Negative.    Neurological: Negative.    Psychiatric/Behavioral: Negative.           Medication List           Accurate as of 10/29/18  2:53 PM. If you have any questions, ask your nurse or doctor.               CONTINUE taking these medications    furosemide 40 MG tablet  Commonly known as:  LASIX     levothyroxine 100 MCG tablet  Commonly known as:  SYNTHROID     LORazepam 0.5 MG tablet  Commonly known as:  ATIVAN     metFORMIN 500 MG tablet  Commonly known as:  GLUCOPHAGE     ONE DAILY MULTIVITAMIN per tablet  Generic drug:  multivitamin     pantoprazole 40 MG tablet  Commonly known as:  PROTONIX     senna-docusate 8.6-50 mg 8.6-50 mg per tablet  Commonly known as:  PERICOLACE  Take 2 tablets by mouth nightly as needed for Constipation.     simvastatin 40 MG tablet  Commonly known as:  ZOCOR     SITagliptin 100 MG Tab  Commonly known as:  JANUVIA     temazepam 22.5 MG capsule  Commonly known as:  RESTORIL        STOP taking these medications    allopurinol 300 MG tablet  Commonly known as:   ZYLOPRIM  Stopped by:  Murray Schmidt MD     clonazePAM 0.5 MG tablet  Commonly known as:  KLONOPIN  Stopped by:  Murray Schmidt MD     gabapentin 300 MG capsule  Commonly known as:  NEURONTIN  Stopped by:  Murray Schmidt MD     levocetirizine 5 MG tablet  Commonly known as:  XYZAL  Stopped by:  Murray Schmidt MD            Objective:   Physical Exam   Constitutional: He is oriented to person, place, and time. He appears well-developed and well-nourished. No distress.   Examination of the digits showed no clubbing or cyanosis   HENT:   Head: Normocephalic and atraumatic.   Eyes: Conjunctivae are normal. Pupils are equal, round, and reactive to light. Right eye exhibits no discharge.   Neck: Normal range of motion. Neck supple. No JVD present. No thyromegaly present.   No carotid bruits   Cardiovascular: Normal rate, regular rhythm, S1 normal, S2 normal, normal heart sounds, intact distal pulses and normal pulses. PMI is not displaced. Exam reveals no gallop, no friction rub and no opening snap.   No murmur heard.  Pulmonary/Chest: Effort normal and breath sounds normal. No respiratory distress. He has no wheezes. He has no rales. He exhibits no tenderness.   Abdominal: Soft. Bowel sounds are normal. He exhibits no distension and no mass. There is no tenderness. There is no guarding.   No hepatosplenomegaly   Musculoskeletal: Normal range of motion. He exhibits no edema or tenderness.   Lymphadenopathy:     He has no cervical adenopathy.   Neurological: He is alert and oriented to person, place, and time.   Skin: Skin is warm. No rash noted. He is not diaphoretic. No erythema.   Psychiatric: He has a normal mood and affect.   Nursing note and vitals reviewed.      ECGs reviewed-NSR  LABS reviewed  Imaging including Echoes reviewed-normal ef with DD    Assessment:     1. HTN (hypertension), benign    2. Diastolic dysfunction without heart failure    3. Mixed hyperlipidemia    4. BMI 36.0-36.9,adult        Plan:      Continue current medication  Activity as tolerate  Weight loss  Pain control  F/u in 6 months.

## 2018-10-31 ENCOUNTER — HOSPITAL ENCOUNTER (EMERGENCY)
Facility: HOSPITAL | Age: 73
Discharge: HOME | End: 2018-11-01
Attending: EMERGENCY MEDICINE
Payer: MEDICARE

## 2018-10-31 DIAGNOSIS — R05.9 COUGH: ICD-10-CM

## 2018-10-31 DIAGNOSIS — J18.9 PNEUMONIA OF RIGHT MIDDLE LOBE DUE TO INFECTIOUS ORGANISM: Primary | ICD-10-CM

## 2018-10-31 PROCEDURE — 99284 EMERGENCY DEPT VISIT MOD MDM: CPT | Mod: 25

## 2018-10-31 PROCEDURE — 96365 THER/PROPH/DIAG IV INF INIT: CPT

## 2018-11-01 VITALS
OXYGEN SATURATION: 98 % | TEMPERATURE: 99 F | WEIGHT: 240 LBS | HEIGHT: 68 IN | RESPIRATION RATE: 19 BRPM | DIASTOLIC BLOOD PRESSURE: 91 MMHG | BODY MASS INDEX: 36.37 KG/M2 | HEART RATE: 90 BPM | SYSTOLIC BLOOD PRESSURE: 162 MMHG

## 2018-11-01 LAB
ALBUMIN SERPL BCP-MCNC: 3.8 G/DL
ALP SERPL-CCNC: 147 U/L
ALT SERPL W/O P-5'-P-CCNC: 65 U/L
ANION GAP SERPL CALC-SCNC: 10 MMOL/L
AST SERPL-CCNC: 110 U/L
BASOPHILS # BLD AUTO: 0.02 K/UL
BASOPHILS NFR BLD: 0.3 %
BILIRUB SERPL-MCNC: 2.6 MG/DL
BUN SERPL-MCNC: 6 MG/DL
CALCIUM SERPL-MCNC: 8.2 MG/DL
CHLORIDE SERPL-SCNC: 103 MMOL/L
CO2 SERPL-SCNC: 22 MMOL/L
CREAT SERPL-MCNC: 0.77 MG/DL
DIFFERENTIAL METHOD: ABNORMAL
EOSINOPHIL # BLD AUTO: 0.1 K/UL
EOSINOPHIL NFR BLD: 0.8 %
ERYTHROCYTE [DISTWIDTH] IN BLOOD BY AUTOMATED COUNT: 12.4 %
EST. GFR  (AFRICAN AMERICAN): >60 ML/MIN/1.73 M^2
EST. GFR  (NON AFRICAN AMERICAN): >60 ML/MIN/1.73 M^2
GLUCOSE SERPL-MCNC: 167 MG/DL
HCT VFR BLD AUTO: 36.9 %
HGB BLD-MCNC: 12.8 G/DL
LYMPHOCYTES # BLD AUTO: 1.1 K/UL
LYMPHOCYTES NFR BLD: 17.2 %
MCH RBC QN AUTO: 35.8 PG
MCHC RBC AUTO-ENTMCNC: 34.7 G/DL
MCV RBC AUTO: 103 FL
MONOCYTES # BLD AUTO: 0.7 K/UL
MONOCYTES NFR BLD: 11.8 %
NEUTROPHILS # BLD AUTO: 4.3 K/UL
NEUTROPHILS NFR BLD: 69.6 %
PLATELET # BLD AUTO: 126 K/UL
PMV BLD AUTO: 9.7 FL
POTASSIUM SERPL-SCNC: 3.5 MMOL/L
PROT SERPL-MCNC: 7.7 G/DL
RBC # BLD AUTO: 3.58 M/UL
SODIUM SERPL-SCNC: 135 MMOL/L
WBC # BLD AUTO: 6.17 K/UL

## 2018-11-01 PROCEDURE — 63600175 PHARM REV CODE 636 W HCPCS: Performed by: EMERGENCY MEDICINE

## 2018-11-01 PROCEDURE — 25000242 PHARM REV CODE 250 ALT 637 W/ HCPCS: Performed by: EMERGENCY MEDICINE

## 2018-11-01 PROCEDURE — 87040 BLOOD CULTURE FOR BACTERIA: CPT

## 2018-11-01 PROCEDURE — 85025 COMPLETE CBC W/AUTO DIFF WBC: CPT

## 2018-11-01 PROCEDURE — 80053 COMPREHEN METABOLIC PANEL: CPT

## 2018-11-01 PROCEDURE — 94760 N-INVAS EAR/PLS OXIMETRY 1: CPT

## 2018-11-01 PROCEDURE — 94640 AIRWAY INHALATION TREATMENT: CPT

## 2018-11-01 RX ORDER — IPRATROPIUM BROMIDE AND ALBUTEROL SULFATE 2.5; .5 MG/3ML; MG/3ML
3 SOLUTION RESPIRATORY (INHALATION)
Status: COMPLETED | OUTPATIENT
Start: 2018-11-01 | End: 2018-11-01

## 2018-11-01 RX ORDER — LEVOFLOXACIN 500 MG/1
500 TABLET, FILM COATED ORAL DAILY
Qty: 10 TABLET | Refills: 0 | Status: SHIPPED | OUTPATIENT
Start: 2018-11-01 | End: 2018-11-11

## 2018-11-01 RX ADMIN — CEFTRIAXONE 1 G: 1 INJECTION, SOLUTION INTRAVENOUS at 02:11

## 2018-11-01 RX ADMIN — IPRATROPIUM BROMIDE AND ALBUTEROL SULFATE 3 ML: .5; 3 SOLUTION RESPIRATORY (INHALATION) at 12:11

## 2018-11-01 NOTE — ED PROVIDER NOTES
Encounter Date: 10/31/2018       History     Chief Complaint   Patient presents with    Cough     green phlegm that started yesterday with a head cold. Increase in severity of symptoms with SOB. Patient also has post nasal drip and congestion     The history is provided by the patient.   Cough   This is a new problem. The current episode started yesterday. The problem has been unchanged. The cough is productive of sputum. There has been no fever. Pertinent negatives include no chest pain, no chills, no sweats, no weight loss, no ear congestion, no headaches, no rhinorrhea, no sore throat, no myalgias, no shortness of breath and no wheezing. He has tried nothing for the symptoms. He is not a smoker. His past medical history does not include bronchitis, pneumonia, bronchiectasis, COPD, emphysema or asthma.     Review of patient's allergies indicates:   Allergen Reactions    Codeine Itching     Past Medical History:   Diagnosis Date    Anxiety     High cholesterol     Hypertension     Hypothyroidism     Type 2 diabetes mellitus, uncontrolled      Past Surgical History:   Procedure Laterality Date    BLOCK-NERVE-MEDIAL BRANCH-LUMBAR Bilateral 1/23/2018    Performed by Vicente [facility]domo III, [facility] at [facility] OR    COLONOSCOPY      MICHELLE-TRANSFORAMINAL Bilateral 12/19/2017    Performed [facility] Vicente [facility] III[facility] at [facility] OR    ESOPHAGUS SURGERY  2012    EYE SURGERY      LAMINECTOMY-LUMBAR Bilateral L2-3,L4-5 Contralateral Medial Facetectomy Laminectomy and Foraminotomy/ Bilateral 3/15/2018    Performed by Riley Diaz [facility] at [facility] OR    LUMBAR EPIDURAL INJECTION  01/2018    x2    TONSILLECTOMY       Family History   Problem Relation Age of Onset    Heart attack Son      Social History     Tobacco Use    Smoking status: Never Smoker    Smokeless tobacco: Never Used   Substance Use Topics    Alcohol use: Yes     Alcohol/week: 0.6 oz     Types: 1 Cans of beer per week     Comment: Everyother day_Pt  "stated,"I drink a few beers."    Drug use: No     Review of Systems   Constitutional: Negative for chills and weight loss.   HENT: Negative for rhinorrhea and sore throat.    Respiratory: Positive for cough. Negative for shortness of breath and wheezing.    Cardiovascular: Negative for chest pain.   Musculoskeletal: Negative for myalgias.   Neurological: Negative for headaches.   All other systems reviewed and are negative.      Physical Exam     Initial Vitals [10/31/18 2345]   BP Pulse Resp Temp SpO2   (!) 158/104 (!) 112 18 98.7 °F (37.1 °C) 97 %      MAP       --         Physical Exam    Nursing note and vitals reviewed.  Constitutional: He appears well-developed and well-nourished.   HENT:   Head: Normocephalic and atraumatic.   Eyes: EOM are normal.   Neck: Normal range of motion. Neck supple.   Cardiovascular: Normal rate, regular rhythm and normal heart sounds.   Pulmonary/Chest: No respiratory distress. He has wheezes. He has no rhonchi. He has no rales.   Abdominal: Soft. There is no tenderness. There is no rebound and no guarding.   Musculoskeletal: Normal range of motion.   Neurological: He is alert and oriented to person, place, and time. GCS score is 15. GCS eye subscore is 4. GCS verbal subscore is 5. GCS motor subscore is 6.   Skin: Skin is warm and dry. Capillary refill takes less than 2 seconds.   Psychiatric: He has a normal mood and affect. His behavior is normal. Judgment and thought content normal.         ED Course   Procedures  Labs Reviewed - No data to display       Imaging Results    None       X-Rays:   Independently Interpreted Readings:   Chest X-Ray: Normal heart size. There is an infiltrate in the RML.     Medical Decision Making:   Clinical Tests:   Lab Tests: Ordered and Reviewed  Radiological Study: Ordered and Reviewed                      Clinical Impression:   The primary encounter diagnosis was Pneumonia of right middle lobe due to infectious organism. A diagnosis of Cough was " also pertinent to this visit.      Disposition:   Disposition: Discharged  Condition: Stable                        Xochitl Rondon MD  11/01/18 0239

## 2018-11-06 LAB
BACTERIA BLD CULT: NORMAL
BACTERIA BLD CULT: NORMAL

## 2020-12-04 ENCOUNTER — LAB VISIT (OUTPATIENT)
Dept: PRIMARY CARE CLINIC | Facility: OTHER | Age: 75
End: 2020-12-04
Attending: INTERNAL MEDICINE
Payer: MEDICARE

## 2020-12-04 DIAGNOSIS — R05.9 COUGH: ICD-10-CM

## 2020-12-04 DIAGNOSIS — Z03.818 ENCOUNTER FOR OBSERVATION FOR SUSPECTED EXPOSURE TO OTHER BIOLOGICAL AGENTS RULED OUT: ICD-10-CM

## 2020-12-04 PROCEDURE — U0003 INFECTIOUS AGENT DETECTION BY NUCLEIC ACID (DNA OR RNA); SEVERE ACUTE RESPIRATORY SYNDROME CORONAVIRUS 2 (SARS-COV-2) (CORONAVIRUS DISEASE [COVID-19]), AMPLIFIED PROBE TECHNIQUE, MAKING USE OF HIGH THROUGHPUT TECHNOLOGIES AS DESCRIBED BY CMS-2020-01-R: HCPCS

## 2020-12-07 LAB — SARS-COV-2 RNA RESP QL NAA+PROBE: NOT DETECTED

## 2021-03-13 ENCOUNTER — HOSPITAL ENCOUNTER (EMERGENCY)
Facility: HOSPITAL | Age: 76
Discharge: ANOTHER HOSPITAL | End: 2021-03-13
Attending: EMERGENCY MEDICINE
Payer: MEDICARE

## 2021-03-13 ENCOUNTER — HOSPITAL ENCOUNTER (OUTPATIENT)
Facility: OTHER | Age: 76
Discharge: HOME | End: 2021-03-14
Attending: HOSPITALIST | Admitting: HOSPITALIST
Payer: MEDICARE

## 2021-03-13 VITALS
HEART RATE: 108 BPM | DIASTOLIC BLOOD PRESSURE: 79 MMHG | OXYGEN SATURATION: 97 % | SYSTOLIC BLOOD PRESSURE: 136 MMHG | RESPIRATION RATE: 20 BRPM | WEIGHT: 250 LBS | BODY MASS INDEX: 37.03 KG/M2 | TEMPERATURE: 99 F | HEIGHT: 69 IN

## 2021-03-13 DIAGNOSIS — E03.9 ACQUIRED HYPOTHYROIDISM: ICD-10-CM

## 2021-03-13 DIAGNOSIS — I48.91 ATRIAL FIBRILLATION WITH RVR: Primary | ICD-10-CM

## 2021-03-13 DIAGNOSIS — I48.91 ATRIAL FIBRILLATION: ICD-10-CM

## 2021-03-13 DIAGNOSIS — R07.9 CHEST PAIN: ICD-10-CM

## 2021-03-13 DIAGNOSIS — I48.91 ATRIAL FIBRILLATION WITH RAPID VENTRICULAR RESPONSE: Primary | ICD-10-CM

## 2021-03-13 DIAGNOSIS — S39.012A STRAIN OF LUMBAR REGION, INITIAL ENCOUNTER: ICD-10-CM

## 2021-03-13 DIAGNOSIS — I48.91 AFIB: ICD-10-CM

## 2021-03-13 DIAGNOSIS — I50.33 ACUTE ON CHRONIC DIASTOLIC HEART FAILURE: ICD-10-CM

## 2021-03-13 DIAGNOSIS — E11.65 TYPE 2 DIABETES MELLITUS WITH HYPERGLYCEMIA, WITHOUT LONG-TERM CURRENT USE OF INSULIN: ICD-10-CM

## 2021-03-13 DIAGNOSIS — R52 PAIN: ICD-10-CM

## 2021-03-13 LAB
ALBUMIN SERPL BCP-MCNC: 3.2 G/DL (ref 3.5–5.2)
ALBUMIN SERPL BCP-MCNC: 3.7 G/DL (ref 3.5–5.2)
ALP SERPL-CCNC: 101 U/L (ref 55–135)
ALP SERPL-CCNC: 112 U/L (ref 38–126)
ALT SERPL W/O P-5'-P-CCNC: 39 U/L (ref 10–44)
ALT SERPL W/O P-5'-P-CCNC: 44 U/L (ref 10–44)
ANION GAP SERPL CALC-SCNC: 14 MMOL/L (ref 8–16)
ANION GAP SERPL CALC-SCNC: 8 MMOL/L (ref 8–16)
AST SERPL-CCNC: 46 U/L (ref 10–40)
AST SERPL-CCNC: 59 U/L (ref 15–46)
BASOPHILS # BLD AUTO: 0.02 K/UL (ref 0–0.2)
BASOPHILS NFR BLD: 0.4 % (ref 0–1.9)
BILIRUB SERPL-MCNC: 2 MG/DL (ref 0.1–1)
BILIRUB SERPL-MCNC: 2 MG/DL (ref 0.1–1)
BILIRUB UR QL STRIP: NEGATIVE
BNP SERPL-MCNC: 183 PG/ML (ref 0–99)
BUN SERPL-MCNC: 7 MG/DL (ref 8–23)
CALCIUM SERPL-MCNC: 8.7 MG/DL (ref 8.7–10.5)
CALCIUM SERPL-MCNC: 8.8 MG/DL (ref 8.7–10.5)
CHLORIDE SERPL-SCNC: 100 MMOL/L (ref 95–110)
CHLORIDE SERPL-SCNC: 102 MMOL/L (ref 95–110)
CLARITY UR REFRACT.AUTO: CLEAR
CO2 SERPL-SCNC: 26 MMOL/L (ref 23–29)
CO2 SERPL-SCNC: 30 MMOL/L (ref 23–29)
COLOR UR AUTO: YELLOW
CREAT SERPL-MCNC: 0.78 MG/DL (ref 0.5–1.4)
CREAT SERPL-MCNC: 0.9 MG/DL (ref 0.5–1.4)
DIFFERENTIAL METHOD: ABNORMAL
EOSINOPHIL # BLD AUTO: 0.1 K/UL (ref 0–0.5)
EOSINOPHIL NFR BLD: 1.4 % (ref 0–8)
ERYTHROCYTE [DISTWIDTH] IN BLOOD BY AUTOMATED COUNT: 11.8 % (ref 11.5–14.5)
EST. GFR  (AFRICAN AMERICAN): >60 ML/MIN/1.73 M^2
EST. GFR  (AFRICAN AMERICAN): >60 ML/MIN/1.73 M^2
EST. GFR  (NON AFRICAN AMERICAN): >60 ML/MIN/1.73 M^2
EST. GFR  (NON AFRICAN AMERICAN): >60 ML/MIN/1.73 M^2
EXTRA BLUE TOP RAINBOW: NORMAL
EXTRA RED TOP RAINBOW: NORMAL
GLUCOSE SERPL-MCNC: 106 MG/DL (ref 70–110)
GLUCOSE SERPL-MCNC: 142 MG/DL (ref 70–110)
GLUCOSE UR QL STRIP: NEGATIVE
HCT VFR BLD AUTO: 37.3 % (ref 40–54)
HGB BLD-MCNC: 12.8 G/DL (ref 14–18)
HGB UR QL STRIP: NEGATIVE
IMM GRANULOCYTES # BLD AUTO: 0.01 K/UL (ref 0–0.04)
IMM GRANULOCYTES NFR BLD AUTO: 0.2 % (ref 0–0.5)
KETONES UR QL STRIP: NEGATIVE
LEUKOCYTE ESTERASE UR QL STRIP: NEGATIVE
LYMPHOCYTES # BLD AUTO: 1.3 K/UL (ref 1–4.8)
LYMPHOCYTES NFR BLD: 27.4 % (ref 18–48)
MAGNESIUM SERPL-MCNC: 1 MG/DL (ref 1.6–2.6)
MCH RBC QN AUTO: 35.5 PG (ref 27–31)
MCHC RBC AUTO-ENTMCNC: 34.3 G/DL (ref 32–36)
MCV RBC AUTO: 103 FL (ref 82–98)
MONOCYTES # BLD AUTO: 0.6 K/UL (ref 0.3–1)
MONOCYTES NFR BLD: 11.7 % (ref 4–15)
NEUTROPHILS # BLD AUTO: 2.9 K/UL (ref 1.8–7.7)
NEUTROPHILS NFR BLD: 58.9 % (ref 38–73)
NITRITE UR QL STRIP: NEGATIVE
NRBC BLD-RTO: 0 /100 WBC
NT-PROBNP SERPL-MCNC: 1070 PG/ML (ref 5–900)
PH UR STRIP: 8 [PH] (ref 5–8)
PHOSPHATE SERPL-MCNC: 4 MG/DL (ref 2.7–4.5)
PLATELET # BLD AUTO: 203 K/UL (ref 150–350)
PMV BLD AUTO: 9.7 FL (ref 9.2–12.9)
POCT GLUCOSE: 192 MG/DL (ref 70–110)
POTASSIUM SERPL-SCNC: 3.8 MMOL/L (ref 3.5–5.1)
POTASSIUM SERPL-SCNC: 4 MMOL/L (ref 3.5–5.1)
PROT SERPL-MCNC: 7.8 G/DL (ref 6–8.4)
PROT SERPL-MCNC: 7.8 G/DL (ref 6–8.4)
PROT UR QL STRIP: NEGATIVE
RBC # BLD AUTO: 3.61 M/UL (ref 4.6–6.2)
SARS-COV-2 RDRP RESP QL NAA+PROBE: NEGATIVE
SODIUM SERPL-SCNC: 140 MMOL/L (ref 136–145)
SODIUM SERPL-SCNC: 140 MMOL/L (ref 136–145)
SP GR UR STRIP: 1.01 (ref 1–1.03)
TROPONIN I SERPL DL<=0.01 NG/ML-MCNC: 0.03 NG/ML (ref 0–0.03)
TROPONIN I SERPL-MCNC: 0.02 NG/ML (ref 0.01–0.03)
URN SPEC COLLECT METH UR: NORMAL
UROBILINOGEN UR STRIP-ACNC: NEGATIVE EU/DL
UUN UR-MCNC: 7 MG/DL (ref 2–20)
WBC # BLD AUTO: 4.89 K/UL (ref 3.9–12.7)

## 2021-03-13 PROCEDURE — 83880 ASSAY OF NATRIURETIC PEPTIDE: CPT | Mod: ER | Performed by: EMERGENCY MEDICINE

## 2021-03-13 PROCEDURE — 96372 THER/PROPH/DIAG INJ SC/IM: CPT | Mod: 59,ER

## 2021-03-13 PROCEDURE — U0002 COVID-19 LAB TEST NON-CDC: HCPCS | Mod: ER | Performed by: EMERGENCY MEDICINE

## 2021-03-13 PROCEDURE — 93005 ELECTROCARDIOGRAM TRACING: CPT | Mod: ER

## 2021-03-13 PROCEDURE — 81003 URINALYSIS AUTO W/O SCOPE: CPT | Mod: ER | Performed by: EMERGENCY MEDICINE

## 2021-03-13 PROCEDURE — 80053 COMPREHEN METABOLIC PANEL: CPT | Mod: 91 | Performed by: NURSE PRACTITIONER

## 2021-03-13 PROCEDURE — 96374 THER/PROPH/DIAG INJ IV PUSH: CPT

## 2021-03-13 PROCEDURE — 96374 THER/PROPH/DIAG INJ IV PUSH: CPT | Mod: ER

## 2021-03-13 PROCEDURE — 96375 TX/PRO/DX INJ NEW DRUG ADDON: CPT

## 2021-03-13 PROCEDURE — 63600175 PHARM REV CODE 636 W HCPCS: Performed by: NURSE PRACTITIONER

## 2021-03-13 PROCEDURE — 99220 PR INITIAL OBSERVATION CARE,LEVL III: ICD-10-PCS | Mod: ,,, | Performed by: NURSE PRACTITIONER

## 2021-03-13 PROCEDURE — 93010 EKG 12-LEAD: ICD-10-PCS | Mod: ,,, | Performed by: INTERNAL MEDICINE

## 2021-03-13 PROCEDURE — 25000003 PHARM REV CODE 250: Mod: ER | Performed by: EMERGENCY MEDICINE

## 2021-03-13 PROCEDURE — 36415 COLL VENOUS BLD VENIPUNCTURE: CPT | Performed by: NURSE PRACTITIONER

## 2021-03-13 PROCEDURE — 99285 EMERGENCY DEPT VISIT HI MDM: CPT | Mod: 25,ER

## 2021-03-13 PROCEDURE — G0379 DIRECT REFER HOSPITAL OBSERV: HCPCS

## 2021-03-13 PROCEDURE — G0378 HOSPITAL OBSERVATION PER HR: HCPCS | Mod: 27

## 2021-03-13 PROCEDURE — 96375 TX/PRO/DX INJ NEW DRUG ADDON: CPT | Mod: ER

## 2021-03-13 PROCEDURE — 84484 ASSAY OF TROPONIN QUANT: CPT | Mod: ER | Performed by: EMERGENCY MEDICINE

## 2021-03-13 PROCEDURE — 84100 ASSAY OF PHOSPHORUS: CPT | Performed by: NURSE PRACTITIONER

## 2021-03-13 PROCEDURE — 85025 COMPLETE CBC W/AUTO DIFF WBC: CPT | Mod: ER | Performed by: EMERGENCY MEDICINE

## 2021-03-13 PROCEDURE — 25000003 PHARM REV CODE 250: Performed by: NURSE PRACTITIONER

## 2021-03-13 PROCEDURE — 84484 ASSAY OF TROPONIN QUANT: CPT | Mod: 91 | Performed by: NURSE PRACTITIONER

## 2021-03-13 PROCEDURE — 99220 PR INITIAL OBSERVATION CARE,LEVL III: CPT | Mod: ,,, | Performed by: NURSE PRACTITIONER

## 2021-03-13 PROCEDURE — 96376 TX/PRO/DX INJ SAME DRUG ADON: CPT | Mod: ER

## 2021-03-13 PROCEDURE — 83735 ASSAY OF MAGNESIUM: CPT | Performed by: NURSE PRACTITIONER

## 2021-03-13 PROCEDURE — 63600175 PHARM REV CODE 636 W HCPCS: Mod: ER | Performed by: EMERGENCY MEDICINE

## 2021-03-13 PROCEDURE — 83036 HEMOGLOBIN GLYCOSYLATED A1C: CPT | Performed by: NURSE PRACTITIONER

## 2021-03-13 PROCEDURE — 93010 ELECTROCARDIOGRAM REPORT: CPT | Mod: ,,, | Performed by: INTERNAL MEDICINE

## 2021-03-13 PROCEDURE — 80053 COMPREHEN METABOLIC PANEL: CPT | Mod: ER | Performed by: EMERGENCY MEDICINE

## 2021-03-13 PROCEDURE — 83880 ASSAY OF NATRIURETIC PEPTIDE: CPT | Mod: 91 | Performed by: NURSE PRACTITIONER

## 2021-03-13 RX ORDER — DILTIAZEM HYDROCHLORIDE 5 MG/ML
10 INJECTION INTRAVENOUS
Status: COMPLETED | OUTPATIENT
Start: 2021-03-13 | End: 2021-03-13

## 2021-03-13 RX ORDER — ONDANSETRON 8 MG/1
8 TABLET, ORALLY DISINTEGRATING ORAL EVERY 8 HOURS PRN
Status: DISCONTINUED | OUTPATIENT
Start: 2021-03-13 | End: 2021-03-14 | Stop reason: HOSPADM

## 2021-03-13 RX ORDER — SIMVASTATIN 10 MG/1
40 TABLET, FILM COATED ORAL NIGHTLY
Status: DISCONTINUED | OUTPATIENT
Start: 2021-03-13 | End: 2021-03-14 | Stop reason: HOSPADM

## 2021-03-13 RX ORDER — MAGNESIUM SULFATE HEPTAHYDRATE 40 MG/ML
2 INJECTION, SOLUTION INTRAVENOUS ONCE
Status: COMPLETED | OUTPATIENT
Start: 2021-03-13 | End: 2021-03-13

## 2021-03-13 RX ORDER — SODIUM CHLORIDE 0.9 % (FLUSH) 0.9 %
10 SYRINGE (ML) INJECTION
Status: DISCONTINUED | OUTPATIENT
Start: 2021-03-13 | End: 2021-03-14 | Stop reason: HOSPADM

## 2021-03-13 RX ORDER — IBUPROFEN 200 MG
24 TABLET ORAL
Status: DISCONTINUED | OUTPATIENT
Start: 2021-03-13 | End: 2021-03-14 | Stop reason: HOSPADM

## 2021-03-13 RX ORDER — METOPROLOL TARTRATE 25 MG/1
25 TABLET, FILM COATED ORAL EVERY 6 HOURS
Status: DISCONTINUED | OUTPATIENT
Start: 2021-03-14 | End: 2021-03-14

## 2021-03-13 RX ORDER — LEVOTHYROXINE SODIUM 100 UG/1
100 TABLET ORAL DAILY
Status: DISCONTINUED | OUTPATIENT
Start: 2021-03-14 | End: 2021-03-14 | Stop reason: HOSPADM

## 2021-03-13 RX ORDER — INSULIN ASPART 100 [IU]/ML
1-10 INJECTION, SOLUTION INTRAVENOUS; SUBCUTANEOUS
Status: DISCONTINUED | OUTPATIENT
Start: 2021-03-13 | End: 2021-03-14 | Stop reason: HOSPADM

## 2021-03-13 RX ORDER — ENOXAPARIN SODIUM 100 MG/ML
1 INJECTION SUBCUTANEOUS
Status: COMPLETED | OUTPATIENT
Start: 2021-03-13 | End: 2021-03-13

## 2021-03-13 RX ORDER — ACETAMINOPHEN 325 MG/1
650 TABLET ORAL EVERY 4 HOURS PRN
Status: DISCONTINUED | OUTPATIENT
Start: 2021-03-13 | End: 2021-03-14 | Stop reason: HOSPADM

## 2021-03-13 RX ORDER — ENOXAPARIN SODIUM 150 MG/ML
1 INJECTION SUBCUTANEOUS EVERY 12 HOURS
Status: DISCONTINUED | OUTPATIENT
Start: 2021-03-14 | End: 2021-03-14

## 2021-03-13 RX ORDER — DILTIAZEM HYDROCHLORIDE 5 MG/ML
20 INJECTION INTRAVENOUS
Status: COMPLETED | OUTPATIENT
Start: 2021-03-13 | End: 2021-03-13

## 2021-03-13 RX ORDER — IBUPROFEN 200 MG
16 TABLET ORAL
Status: DISCONTINUED | OUTPATIENT
Start: 2021-03-13 | End: 2021-03-14 | Stop reason: HOSPADM

## 2021-03-13 RX ORDER — FUROSEMIDE 10 MG/ML
40 INJECTION INTRAMUSCULAR; INTRAVENOUS
Status: DISCONTINUED | OUTPATIENT
Start: 2021-03-13 | End: 2021-03-14 | Stop reason: HOSPADM

## 2021-03-13 RX ORDER — GLUCAGON 1 MG
1 KIT INJECTION
Status: DISCONTINUED | OUTPATIENT
Start: 2021-03-13 | End: 2021-03-14 | Stop reason: HOSPADM

## 2021-03-13 RX ORDER — FUROSEMIDE 10 MG/ML
80 INJECTION INTRAMUSCULAR; INTRAVENOUS
Status: COMPLETED | OUTPATIENT
Start: 2021-03-13 | End: 2021-03-13

## 2021-03-13 RX ADMIN — METOPROLOL TARTRATE 25 MG: 25 TABLET, FILM COATED ORAL at 11:03

## 2021-03-13 RX ADMIN — FUROSEMIDE 80 MG: 10 INJECTION, SOLUTION INTRAMUSCULAR; INTRAVENOUS at 11:03

## 2021-03-13 RX ADMIN — FUROSEMIDE 40 MG: 10 INJECTION, SOLUTION INTRAMUSCULAR; INTRAVENOUS at 08:03

## 2021-03-13 RX ADMIN — MAGNESIUM SULFATE 2 G: 2 INJECTION INTRAVENOUS at 10:03

## 2021-03-13 RX ADMIN — ENOXAPARIN SODIUM 110 MG: 100 INJECTION SUBCUTANEOUS at 03:03

## 2021-03-13 RX ADMIN — DILTIAZEM HYDROCHLORIDE 10 MG: 5 INJECTION INTRAVENOUS at 12:03

## 2021-03-13 RX ADMIN — SIMVASTATIN 40 MG: 10 TABLET, FILM COATED ORAL at 08:03

## 2021-03-13 RX ADMIN — DILTIAZEM HYDROCHLORIDE 20 MG: 5 INJECTION INTRAVENOUS at 10:03

## 2021-03-14 VITALS
WEIGHT: 244.69 LBS | HEART RATE: 101 BPM | OXYGEN SATURATION: 95 % | RESPIRATION RATE: 18 BRPM | HEIGHT: 69 IN | DIASTOLIC BLOOD PRESSURE: 82 MMHG | TEMPERATURE: 97 F | SYSTOLIC BLOOD PRESSURE: 127 MMHG | BODY MASS INDEX: 36.24 KG/M2

## 2021-03-14 PROBLEM — I50.33 ACUTE ON CHRONIC DIASTOLIC HEART FAILURE: Status: ACTIVE | Noted: 2018-03-09

## 2021-03-14 LAB
ALBUMIN SERPL BCP-MCNC: 3 G/DL (ref 3.5–5.2)
ALP SERPL-CCNC: 92 U/L (ref 55–135)
ALT SERPL W/O P-5'-P-CCNC: 36 U/L (ref 10–44)
ANION GAP SERPL CALC-SCNC: 11 MMOL/L (ref 8–16)
AST SERPL-CCNC: 42 U/L (ref 10–40)
BILIRUB SERPL-MCNC: 1.8 MG/DL (ref 0.1–1)
BUN SERPL-MCNC: 7 MG/DL (ref 8–23)
CALCIUM SERPL-MCNC: 8.9 MG/DL (ref 8.7–10.5)
CHLORIDE SERPL-SCNC: 99 MMOL/L (ref 95–110)
CHOLEST SERPL-MCNC: 138 MG/DL (ref 120–199)
CHOLEST/HDLC SERPL: 2.2 {RATIO} (ref 2–5)
CO2 SERPL-SCNC: 28 MMOL/L (ref 23–29)
CREAT SERPL-MCNC: 1 MG/DL (ref 0.5–1.4)
ERYTHROCYTE [DISTWIDTH] IN BLOOD BY AUTOMATED COUNT: 12 % (ref 11.5–14.5)
EST. GFR  (AFRICAN AMERICAN): >60 ML/MIN/1.73 M^2
EST. GFR  (NON AFRICAN AMERICAN): >60 ML/MIN/1.73 M^2
ESTIMATED AVG GLUCOSE: 111 MG/DL (ref 68–131)
GLUCOSE SERPL-MCNC: 123 MG/DL (ref 70–110)
HBA1C MFR BLD: 5.5 % (ref 4–5.6)
HCT VFR BLD AUTO: 36.5 % (ref 40–54)
HDLC SERPL-MCNC: 62 MG/DL (ref 40–75)
HDLC SERPL: 44.9 % (ref 20–50)
HGB BLD-MCNC: 12.5 G/DL (ref 14–18)
LDLC SERPL CALC-MCNC: 61.4 MG/DL (ref 63–159)
MAGNESIUM SERPL-MCNC: 1.5 MG/DL (ref 1.6–2.6)
MCH RBC QN AUTO: 35.3 PG (ref 27–31)
MCHC RBC AUTO-ENTMCNC: 34.2 G/DL (ref 32–36)
MCV RBC AUTO: 103 FL (ref 82–98)
NONHDLC SERPL-MCNC: 76 MG/DL
PHOSPHATE SERPL-MCNC: 4.3 MG/DL (ref 2.7–4.5)
PLATELET # BLD AUTO: 191 K/UL (ref 150–350)
PMV BLD AUTO: 9.6 FL (ref 9.2–12.9)
POCT GLUCOSE: 123 MG/DL (ref 70–110)
POTASSIUM SERPL-SCNC: 4.7 MMOL/L (ref 3.5–5.1)
PROT SERPL-MCNC: 7.5 G/DL (ref 6–8.4)
RBC # BLD AUTO: 3.54 M/UL (ref 4.6–6.2)
SODIUM SERPL-SCNC: 138 MMOL/L (ref 136–145)
TRIGL SERPL-MCNC: 73 MG/DL (ref 30–150)
TROPONIN I SERPL DL<=0.01 NG/ML-MCNC: 0.02 NG/ML (ref 0–0.03)
TSH SERPL DL<=0.005 MIU/L-ACNC: 0.62 UIU/ML (ref 0.4–4)
WBC # BLD AUTO: 4.75 K/UL (ref 3.9–12.7)

## 2021-03-14 PROCEDURE — 80061 LIPID PANEL: CPT | Performed by: INTERNAL MEDICINE

## 2021-03-14 PROCEDURE — 63600175 PHARM REV CODE 636 W HCPCS: Performed by: NURSE PRACTITIONER

## 2021-03-14 PROCEDURE — 84484 ASSAY OF TROPONIN QUANT: CPT | Performed by: NURSE PRACTITIONER

## 2021-03-14 PROCEDURE — 84443 ASSAY THYROID STIM HORMONE: CPT | Performed by: INTERNAL MEDICINE

## 2021-03-14 PROCEDURE — 96372 THER/PROPH/DIAG INJ SC/IM: CPT

## 2021-03-14 PROCEDURE — G0378 HOSPITAL OBSERVATION PER HR: HCPCS

## 2021-03-14 PROCEDURE — 36415 COLL VENOUS BLD VENIPUNCTURE: CPT | Performed by: NURSE PRACTITIONER

## 2021-03-14 PROCEDURE — 25000003 PHARM REV CODE 250: Performed by: INTERNAL MEDICINE

## 2021-03-14 PROCEDURE — 99219 PR INITIAL OBSERVATION CARE,LEVL II: CPT | Mod: ,,, | Performed by: INTERNAL MEDICINE

## 2021-03-14 PROCEDURE — 25000003 PHARM REV CODE 250: Performed by: NURSE PRACTITIONER

## 2021-03-14 PROCEDURE — 99217 PR OBSERVATION CARE DISCHARGE: ICD-10-PCS | Mod: ,,, | Performed by: HOSPITALIST

## 2021-03-14 PROCEDURE — 99217 PR OBSERVATION CARE DISCHARGE: CPT | Mod: ,,, | Performed by: HOSPITALIST

## 2021-03-14 PROCEDURE — 85027 COMPLETE CBC AUTOMATED: CPT | Performed by: NURSE PRACTITIONER

## 2021-03-14 PROCEDURE — 83735 ASSAY OF MAGNESIUM: CPT | Performed by: NURSE PRACTITIONER

## 2021-03-14 PROCEDURE — 96376 TX/PRO/DX INJ SAME DRUG ADON: CPT

## 2021-03-14 PROCEDURE — 80053 COMPREHEN METABOLIC PANEL: CPT | Performed by: NURSE PRACTITIONER

## 2021-03-14 PROCEDURE — 84100 ASSAY OF PHOSPHORUS: CPT | Performed by: NURSE PRACTITIONER

## 2021-03-14 PROCEDURE — 99219 PR INITIAL OBSERVATION CARE,LEVL II: ICD-10-PCS | Mod: ,,, | Performed by: INTERNAL MEDICINE

## 2021-03-14 RX ORDER — METOPROLOL SUCCINATE 25 MG/1
25 TABLET, EXTENDED RELEASE ORAL DAILY
Status: ON HOLD | COMMUNITY
End: 2021-03-14 | Stop reason: SDUPTHER

## 2021-03-14 RX ORDER — METOPROLOL SUCCINATE 50 MG/1
50 TABLET, EXTENDED RELEASE ORAL 2 TIMES DAILY
Status: DISCONTINUED | OUTPATIENT
Start: 2021-03-14 | End: 2021-03-14 | Stop reason: HOSPADM

## 2021-03-14 RX ORDER — FUROSEMIDE 40 MG/1
40 TABLET ORAL 2 TIMES DAILY
Qty: 60 TABLET | Refills: 0 | Status: SHIPPED | OUTPATIENT
Start: 2021-03-14

## 2021-03-14 RX ORDER — METOPROLOL SUCCINATE 100 MG/1
100 TABLET, EXTENDED RELEASE ORAL DAILY
Qty: 30 TABLET | Refills: 0 | Status: ON HOLD | OUTPATIENT
Start: 2021-03-14 | End: 2021-04-08 | Stop reason: SDUPTHER

## 2021-03-14 RX ADMIN — FUROSEMIDE 40 MG: 10 INJECTION, SOLUTION INTRAMUSCULAR; INTRAVENOUS at 09:03

## 2021-03-14 RX ADMIN — ENOXAPARIN SODIUM 120 MG: 120 INJECTION SUBCUTANEOUS at 09:03

## 2021-03-14 RX ADMIN — METOPROLOL TARTRATE 25 MG: 25 TABLET, FILM COATED ORAL at 05:03

## 2021-03-14 RX ADMIN — THERA TABS 1 TABLET: TAB at 09:03

## 2021-03-14 RX ADMIN — LEVOTHYROXINE SODIUM 100 MCG: 100 TABLET ORAL at 09:03

## 2021-03-15 ENCOUNTER — TELEPHONE (OUTPATIENT)
Dept: ELECTROPHYSIOLOGY | Facility: CLINIC | Age: 76
End: 2021-03-15

## 2021-03-22 ENCOUNTER — HOSPITAL ENCOUNTER (OUTPATIENT)
Dept: CARDIOLOGY | Facility: HOSPITAL | Age: 76
Discharge: HOME | End: 2021-03-22
Attending: FAMILY MEDICINE
Payer: MEDICARE

## 2021-03-22 VITALS — BODY MASS INDEX: 36.14 KG/M2 | HEIGHT: 69 IN | WEIGHT: 244 LBS

## 2021-03-22 DIAGNOSIS — I48.91 ATRIAL FIBRILLATION: ICD-10-CM

## 2021-03-22 DIAGNOSIS — I50.9 HEART FAILURE, UNSPECIFIED: ICD-10-CM

## 2021-03-22 PROCEDURE — 93306 ECHO (CUPID ONLY): ICD-10-PCS | Mod: 26,,, | Performed by: INTERNAL MEDICINE

## 2021-03-22 PROCEDURE — 93306 TTE W/DOPPLER COMPLETE: CPT | Mod: PO

## 2021-03-22 PROCEDURE — 93306 TTE W/DOPPLER COMPLETE: CPT | Mod: 26,,, | Performed by: INTERNAL MEDICINE

## 2021-03-23 LAB
AORTIC ROOT ANNULUS: 2.93 CM
AORTIC VALVE CUSP SEPERATION: 1.8 CM
AV INDEX (PROSTH): 0.3
AV MEAN GRADIENT: 11 MMHG
AV PEAK GRADIENT: 18 MMHG
AV VALVE AREA: 1.08 CM2
AV VELOCITY RATIO: 0.31
BSA FOR ECHO PROCEDURE: 2.32 M2
CV ECHO LV RWT: 0.91 CM
DOP CALC AO PEAK VEL: 2.13 M/S
DOP CALC AO VTI: 42.89 CM
DOP CALC LVOT AREA: 3.6 CM2
DOP CALC LVOT DIAMETER: 2.14 CM
DOP CALC LVOT PEAK VEL: 0.66 M/S
DOP CALC LVOT STROKE VOLUME: 46.45 CM3
DOP CALCLVOT PEAK VEL VTI: 12.92 CM
E WAVE DECELERATION TIME: 251.99 MSEC
E/A RATIO: 2.38
E/E' RATIO: 29 M/S
ECHO LV POSTERIOR WALL: 1.71 CM (ref 0.6–1.1)
FRACTIONAL SHORTENING: 37 % (ref 28–44)
INTERVENTRICULAR SEPTUM: 1.61 CM (ref 0.6–1.1)
LA MAJOR: 5.56 CM
LA MINOR: 5.45 CM
LA WIDTH: 3.69 CM
LEFT ATRIUM SIZE: 5.23 CM
LEFT ATRIUM VOLUME INDEX: 40.1 ML/M2
LEFT ATRIUM VOLUME: 90.29 CM3
LEFT INTERNAL DIMENSION IN SYSTOLE: 2.37 CM (ref 2.1–4)
LEFT VENTRICLE DIASTOLIC VOLUME INDEX: 26.96 ML/M2
LEFT VENTRICLE DIASTOLIC VOLUME: 60.66 ML
LEFT VENTRICLE MASS INDEX: 112 G/M2
LEFT VENTRICLE SYSTOLIC VOLUME INDEX: 8.7 ML/M2
LEFT VENTRICLE SYSTOLIC VOLUME: 19.54 ML
LEFT VENTRICULAR INTERNAL DIMENSION IN DIASTOLE: 3.77 CM (ref 3.5–6)
LEFT VENTRICULAR MASS: 252.54 G
LV LATERAL E/E' RATIO: 36.25 M/S
LV SEPTAL E/E' RATIO: 24.17 M/S
MV MEAN GRADIENT: 1 MMHG
MV PEAK A VEL: 0.61 M/S
MV PEAK E VEL: 1.45 M/S
MV PEAK GRADIENT: 12 MMHG
MV STENOSIS PRESSURE HALF TIME: 73.08 MS
MV VALVE AREA P 1/2 METHOD: 3.01 CM2
PISA MRMAX VEL: 0.04 M/S
PISA TR MAX VEL: 2.45 M/S
PV PEAK VELOCITY: 0.77 CM/S
RA MAJOR: 5.53 CM
RA PRESSURE: 3 MMHG
RA WIDTH: 4.19 CM
RIGHT VENTRICULAR END-DIASTOLIC DIMENSION: 3.11 CM
SINUS: 2.95 CM
TDI LATERAL: 0.04 M/S
TDI SEPTAL: 0.06 M/S
TDI: 0.05 M/S
TR MAX PG: 24 MMHG
TV REST PULMONARY ARTERY PRESSURE: 27 MMHG

## 2021-03-25 ENCOUNTER — TELEPHONE (OUTPATIENT)
Dept: ELECTROPHYSIOLOGY | Facility: CLINIC | Age: 76
End: 2021-03-25

## 2021-03-25 DIAGNOSIS — I48.91 ATRIAL FIBRILLATION, UNSPECIFIED TYPE: Primary | ICD-10-CM

## 2021-03-29 ENCOUNTER — TELEPHONE (OUTPATIENT)
Dept: ELECTROPHYSIOLOGY | Facility: CLINIC | Age: 76
End: 2021-03-29

## 2021-03-30 ENCOUNTER — TELEPHONE (OUTPATIENT)
Dept: ELECTROPHYSIOLOGY | Facility: CLINIC | Age: 76
End: 2021-03-30

## 2021-03-30 ENCOUNTER — HOSPITAL ENCOUNTER (OUTPATIENT)
Dept: CARDIOLOGY | Facility: CLINIC | Age: 76
Discharge: HOME | End: 2021-03-30
Payer: MEDICARE

## 2021-03-30 ENCOUNTER — OFFICE VISIT (OUTPATIENT)
Dept: ELECTROPHYSIOLOGY | Facility: CLINIC | Age: 76
End: 2021-03-30
Payer: MEDICARE

## 2021-03-30 VITALS
BODY MASS INDEX: 35.4 KG/M2 | WEIGHT: 239 LBS | DIASTOLIC BLOOD PRESSURE: 72 MMHG | HEIGHT: 69 IN | SYSTOLIC BLOOD PRESSURE: 134 MMHG | HEART RATE: 99 BPM

## 2021-03-30 DIAGNOSIS — I48.91 ATRIAL FIBRILLATION WITH RVR: Primary | ICD-10-CM

## 2021-03-30 DIAGNOSIS — I48.91 ATRIAL FIBRILLATION, UNSPECIFIED TYPE: ICD-10-CM

## 2021-03-30 DIAGNOSIS — E11.65 TYPE 2 DIABETES MELLITUS WITH HYPERGLYCEMIA, WITHOUT LONG-TERM CURRENT USE OF INSULIN: ICD-10-CM

## 2021-03-30 DIAGNOSIS — Z01.89 ENCOUNTER FOR OTHER SPECIFIED SPECIAL EXAMINATIONS: ICD-10-CM

## 2021-03-30 DIAGNOSIS — I10 ESSENTIAL HYPERTENSION: ICD-10-CM

## 2021-03-30 PROCEDURE — 99999 PR PBB SHADOW E&M-EST. PATIENT-LVL III: ICD-10-PCS | Mod: PBBFAC,,, | Performed by: INTERNAL MEDICINE

## 2021-03-30 PROCEDURE — 3044F PR MOST RECENT HEMOGLOBIN A1C LEVEL <7.0%: ICD-10-PCS | Mod: CPTII,S$GLB,, | Performed by: INTERNAL MEDICINE

## 2021-03-30 PROCEDURE — 3288F PR FALLS RISK ASSESSMENT DOCUMENTED: ICD-10-PCS | Mod: CPTII,S$GLB,, | Performed by: INTERNAL MEDICINE

## 2021-03-30 PROCEDURE — 3288F FALL RISK ASSESSMENT DOCD: CPT | Mod: CPTII,S$GLB,, | Performed by: INTERNAL MEDICINE

## 2021-03-30 PROCEDURE — 93010 RHYTHM STRIP: ICD-10-PCS | Mod: S$GLB,,, | Performed by: INTERNAL MEDICINE

## 2021-03-30 PROCEDURE — 1100F PTFALLS ASSESS-DOCD GE2>/YR: CPT | Mod: CPTII,S$GLB,, | Performed by: INTERNAL MEDICINE

## 2021-03-30 PROCEDURE — 3078F PR MOST RECENT DIASTOLIC BLOOD PRESSURE < 80 MM HG: ICD-10-PCS | Mod: CPTII,S$GLB,, | Performed by: INTERNAL MEDICINE

## 2021-03-30 PROCEDURE — 3044F HG A1C LEVEL LT 7.0%: CPT | Mod: CPTII,S$GLB,, | Performed by: INTERNAL MEDICINE

## 2021-03-30 PROCEDURE — 1159F PR MEDICATION LIST DOCUMENTED IN MEDICAL RECORD: ICD-10-PCS | Mod: S$GLB,,, | Performed by: INTERNAL MEDICINE

## 2021-03-30 PROCEDURE — 3075F SYST BP GE 130 - 139MM HG: CPT | Mod: CPTII,S$GLB,, | Performed by: INTERNAL MEDICINE

## 2021-03-30 PROCEDURE — 99999 PR PBB SHADOW E&M-EST. PATIENT-LVL III: CPT | Mod: PBBFAC,,, | Performed by: INTERNAL MEDICINE

## 2021-03-30 PROCEDURE — 93005 ELECTROCARDIOGRAM TRACING: CPT | Mod: S$GLB,,, | Performed by: INTERNAL MEDICINE

## 2021-03-30 PROCEDURE — 3075F PR MOST RECENT SYSTOLIC BLOOD PRESS GE 130-139MM HG: ICD-10-PCS | Mod: CPTII,S$GLB,, | Performed by: INTERNAL MEDICINE

## 2021-03-30 PROCEDURE — 1100F PR PT FALLS ASSESS DOC 2+ FALLS/FALL W/INJURY/YR: ICD-10-PCS | Mod: CPTII,S$GLB,, | Performed by: INTERNAL MEDICINE

## 2021-03-30 PROCEDURE — 99204 PR OFFICE/OUTPT VISIT, NEW, LEVL IV, 45-59 MIN: ICD-10-PCS | Mod: S$GLB,,, | Performed by: INTERNAL MEDICINE

## 2021-03-30 PROCEDURE — 1126F PR PAIN SEVERITY QUANTIFIED, NO PAIN PRESENT: ICD-10-PCS | Mod: S$GLB,,, | Performed by: INTERNAL MEDICINE

## 2021-03-30 PROCEDURE — 1126F AMNT PAIN NOTED NONE PRSNT: CPT | Mod: S$GLB,,, | Performed by: INTERNAL MEDICINE

## 2021-03-30 PROCEDURE — 99204 OFFICE O/P NEW MOD 45 MIN: CPT | Mod: S$GLB,,, | Performed by: INTERNAL MEDICINE

## 2021-03-30 PROCEDURE — 1159F MED LIST DOCD IN RCRD: CPT | Mod: S$GLB,,, | Performed by: INTERNAL MEDICINE

## 2021-03-30 PROCEDURE — 3078F DIAST BP <80 MM HG: CPT | Mod: CPTII,S$GLB,, | Performed by: INTERNAL MEDICINE

## 2021-03-30 PROCEDURE — 93010 ELECTROCARDIOGRAM REPORT: CPT | Mod: S$GLB,,, | Performed by: INTERNAL MEDICINE

## 2021-03-30 PROCEDURE — 93005 RHYTHM STRIP: ICD-10-PCS | Mod: S$GLB,,, | Performed by: INTERNAL MEDICINE

## 2021-03-30 RX ORDER — GLIPIZIDE 5 MG/1
5 TABLET, FILM COATED, EXTENDED RELEASE ORAL
COMMUNITY
End: 2023-03-14 | Stop reason: SDUPTHER

## 2021-04-05 ENCOUNTER — LAB VISIT (OUTPATIENT)
Dept: LAB | Facility: HOSPITAL | Age: 76
End: 2021-04-05
Attending: INTERNAL MEDICINE
Payer: MEDICARE

## 2021-04-05 ENCOUNTER — LAB VISIT (OUTPATIENT)
Dept: FAMILY MEDICINE | Facility: CLINIC | Age: 76
End: 2021-04-05
Payer: MEDICARE

## 2021-04-05 DIAGNOSIS — I48.91 ATRIAL FIBRILLATION WITH RVR: ICD-10-CM

## 2021-04-05 LAB
ANION GAP SERPL CALC-SCNC: 14 MMOL/L (ref 8–16)
APTT BLDCRRT: 30.6 SEC (ref 21–32)
BASOPHILS # BLD AUTO: 0.03 K/UL (ref 0–0.2)
BASOPHILS NFR BLD: 0.5 % (ref 0–1.9)
BUN SERPL-MCNC: 14 MG/DL (ref 8–23)
CALCIUM SERPL-MCNC: 8.5 MG/DL (ref 8.7–10.5)
CHLORIDE SERPL-SCNC: 101 MMOL/L (ref 95–110)
CO2 SERPL-SCNC: 24 MMOL/L (ref 23–29)
CREAT SERPL-MCNC: 0.9 MG/DL (ref 0.5–1.4)
DIFFERENTIAL METHOD: ABNORMAL
EOSINOPHIL # BLD AUTO: 0.2 K/UL (ref 0–0.5)
EOSINOPHIL NFR BLD: 2.8 % (ref 0–8)
ERYTHROCYTE [DISTWIDTH] IN BLOOD BY AUTOMATED COUNT: 11.6 % (ref 11.5–14.5)
EST. GFR  (AFRICAN AMERICAN): >60 ML/MIN/1.73 M^2
EST. GFR  (NON AFRICAN AMERICAN): >60 ML/MIN/1.73 M^2
GLUCOSE SERPL-MCNC: 164 MG/DL (ref 70–110)
HCT VFR BLD AUTO: 35.5 % (ref 40–54)
HGB BLD-MCNC: 12.4 G/DL (ref 14–18)
IMM GRANULOCYTES # BLD AUTO: 0.02 K/UL (ref 0–0.04)
IMM GRANULOCYTES NFR BLD AUTO: 0.3 % (ref 0–0.5)
INR PPP: 1.2 (ref 0.8–1.2)
LYMPHOCYTES # BLD AUTO: 1.1 K/UL (ref 1–4.8)
LYMPHOCYTES NFR BLD: 16.7 % (ref 18–48)
MCH RBC QN AUTO: 35.3 PG (ref 27–31)
MCHC RBC AUTO-ENTMCNC: 34.9 G/DL (ref 32–36)
MCV RBC AUTO: 101 FL (ref 82–98)
MONOCYTES # BLD AUTO: 0.7 K/UL (ref 0.3–1)
MONOCYTES NFR BLD: 11.2 % (ref 4–15)
NEUTROPHILS # BLD AUTO: 4.4 K/UL (ref 1.8–7.7)
NEUTROPHILS NFR BLD: 68.5 % (ref 38–73)
NRBC BLD-RTO: 0 /100 WBC
PLATELET # BLD AUTO: 232 K/UL (ref 150–450)
PMV BLD AUTO: 10.4 FL (ref 9.2–12.9)
POTASSIUM SERPL-SCNC: 4.2 MMOL/L (ref 3.5–5.1)
PROTHROMBIN TIME: 13 SEC (ref 9–12.5)
RBC # BLD AUTO: 3.51 M/UL (ref 4.6–6.2)
SODIUM SERPL-SCNC: 139 MMOL/L (ref 136–145)
WBC # BLD AUTO: 6.42 K/UL (ref 3.9–12.7)

## 2021-04-05 PROCEDURE — 85025 COMPLETE CBC W/AUTO DIFF WBC: CPT | Performed by: INTERNAL MEDICINE

## 2021-04-05 PROCEDURE — 85610 PROTHROMBIN TIME: CPT | Performed by: INTERNAL MEDICINE

## 2021-04-05 PROCEDURE — U0003 INFECTIOUS AGENT DETECTION BY NUCLEIC ACID (DNA OR RNA); SEVERE ACUTE RESPIRATORY SYNDROME CORONAVIRUS 2 (SARS-COV-2) (CORONAVIRUS DISEASE [COVID-19]), AMPLIFIED PROBE TECHNIQUE, MAKING USE OF HIGH THROUGHPUT TECHNOLOGIES AS DESCRIBED BY CMS-2020-01-R: HCPCS | Performed by: INTERNAL MEDICINE

## 2021-04-05 PROCEDURE — U0005 INFEC AGEN DETEC AMPLI PROBE: HCPCS | Performed by: INTERNAL MEDICINE

## 2021-04-05 PROCEDURE — 85730 THROMBOPLASTIN TIME PARTIAL: CPT | Performed by: INTERNAL MEDICINE

## 2021-04-05 PROCEDURE — 80048 BASIC METABOLIC PNL TOTAL CA: CPT | Performed by: INTERNAL MEDICINE

## 2021-04-05 PROCEDURE — 36415 COLL VENOUS BLD VENIPUNCTURE: CPT | Performed by: INTERNAL MEDICINE

## 2021-04-06 LAB — SARS-COV-2 RNA RESP QL NAA+PROBE: NOT DETECTED

## 2021-04-07 ENCOUNTER — TELEPHONE (OUTPATIENT)
Dept: ELECTROPHYSIOLOGY | Facility: CLINIC | Age: 76
End: 2021-04-07

## 2021-04-08 ENCOUNTER — RESEARCH ENCOUNTER (OUTPATIENT)
Dept: RESEARCH | Facility: HOSPITAL | Age: 76
End: 2021-04-08

## 2021-04-08 ENCOUNTER — ANESTHESIA EVENT (OUTPATIENT)
Dept: MEDSURG UNIT | Facility: HOSPITAL | Age: 76
End: 2021-04-08
Payer: MEDICARE

## 2021-04-08 ENCOUNTER — HOSPITAL ENCOUNTER (OUTPATIENT)
Facility: HOSPITAL | Age: 76
Discharge: HOME | End: 2021-04-08
Attending: INTERNAL MEDICINE | Admitting: INTERNAL MEDICINE
Payer: MEDICARE

## 2021-04-08 ENCOUNTER — HOSPITAL ENCOUNTER (OUTPATIENT)
Dept: CARDIOLOGY | Facility: HOSPITAL | Age: 76
Discharge: HOME | End: 2021-04-08
Attending: INTERNAL MEDICINE | Admitting: INTERNAL MEDICINE
Payer: MEDICARE

## 2021-04-08 ENCOUNTER — ANESTHESIA (OUTPATIENT)
Dept: MEDSURG UNIT | Facility: HOSPITAL | Age: 76
End: 2021-04-08
Payer: MEDICARE

## 2021-04-08 VITALS
BODY MASS INDEX: 36.25 KG/M2 | SYSTOLIC BLOOD PRESSURE: 130 MMHG | TEMPERATURE: 98 F | DIASTOLIC BLOOD PRESSURE: 79 MMHG | HEART RATE: 72 BPM | WEIGHT: 239.19 LBS | HEIGHT: 68 IN | RESPIRATION RATE: 27 BRPM | OXYGEN SATURATION: 96 %

## 2021-04-08 VITALS
WEIGHT: 239 LBS | HEIGHT: 68 IN | DIASTOLIC BLOOD PRESSURE: 56 MMHG | BODY MASS INDEX: 36.22 KG/M2 | HEART RATE: 93 BPM | SYSTOLIC BLOOD PRESSURE: 101 MMHG

## 2021-04-08 DIAGNOSIS — I48.91 ATRIAL FIBRILLATION WITH RVR: ICD-10-CM

## 2021-04-08 DIAGNOSIS — Z01.89 ENCOUNTER FOR OTHER SPECIFIED SPECIAL EXAMINATIONS: ICD-10-CM

## 2021-04-08 DIAGNOSIS — I48.91 ATRIAL FIBRILLATION, UNSPECIFIED TYPE: ICD-10-CM

## 2021-04-08 DIAGNOSIS — I48.91 ATRIAL FIBRILLATION: ICD-10-CM

## 2021-04-08 DIAGNOSIS — I48.91 ATRIAL FIBRILLATION STATUS POST CARDIOVERSION: ICD-10-CM

## 2021-04-08 PROBLEM — I48.19 PERSISTENT ATRIAL FIBRILLATION: Status: ACTIVE | Noted: 2021-03-13

## 2021-04-08 LAB
AV MEAN GRADIENT: 11 MMHG
AV PEAK GRADIENT: 19 MMHG
BSA FOR ECHO PROCEDURE: 2.28 M2
DOP CALC AO PEAK VEL: 2.2 M/S
DOP CALC AO VTI: 52.6 CM
DOP CALC LVOT AREA: 2.8 CM2
DOP CALC LVOT DIAMETER: 1.9 CM
DOP CALC MV VTI: 25 CM
EJECTION FRACTION: 55 %
HR MV ECHO: 89 BPM
MV MEAN GRADIENT: 4 MMHG
MV PEAK GRADIENT: 7 MMHG
POCT GLUCOSE: 171 MG/DL (ref 70–110)
RA PRESSURE: 3 MMHG
SINUS: 3.5 CM

## 2021-04-08 PROCEDURE — 93320 DOPPLER ECHO COMPLETE: CPT | Mod: 26,,, | Performed by: INTERNAL MEDICINE

## 2021-04-08 PROCEDURE — D9220A PRA ANESTHESIA: Mod: CRNA,,, | Performed by: STUDENT IN AN ORGANIZED HEALTH CARE EDUCATION/TRAINING PROGRAM

## 2021-04-08 PROCEDURE — D9220A PRA ANESTHESIA: Mod: ANES,,, | Performed by: ANESTHESIOLOGY

## 2021-04-08 PROCEDURE — 93325 DOPPLER ECHO COLOR FLOW MAPG: CPT | Mod: 26,,, | Performed by: INTERNAL MEDICINE

## 2021-04-08 PROCEDURE — 93010 ELECTROCARDIOGRAM REPORT: CPT | Mod: ,,, | Performed by: INTERNAL MEDICINE

## 2021-04-08 PROCEDURE — 25000003 PHARM REV CODE 250: Performed by: ANESTHESIOLOGY

## 2021-04-08 PROCEDURE — 93325 PR DOPPLER COLOR FLOW VELOCITY MAP: ICD-10-PCS | Mod: 26,,, | Performed by: INTERNAL MEDICINE

## 2021-04-08 PROCEDURE — 92960 CARDIOVERSION ELECTRIC EXT: CPT | Performed by: INTERNAL MEDICINE

## 2021-04-08 PROCEDURE — 93010 EKG 12-LEAD: ICD-10-PCS | Mod: ,,, | Performed by: INTERNAL MEDICINE

## 2021-04-08 PROCEDURE — 93320 DOPPLER ECHO COMPLETE: CPT

## 2021-04-08 PROCEDURE — 92960 PR CARDIOVERSION, ELECTIVE;EXTERN: ICD-10-PCS | Mod: ,,, | Performed by: INTERNAL MEDICINE

## 2021-04-08 PROCEDURE — 25000003 PHARM REV CODE 250: Performed by: STUDENT IN AN ORGANIZED HEALTH CARE EDUCATION/TRAINING PROGRAM

## 2021-04-08 PROCEDURE — 93312 ECHO TRANSESOPHAGEAL: CPT | Mod: 26,,, | Performed by: INTERNAL MEDICINE

## 2021-04-08 PROCEDURE — 93320 TRANSESOPHAGEAL ECHO (TEE) (CUPID ONLY): ICD-10-PCS | Mod: 26,,, | Performed by: INTERNAL MEDICINE

## 2021-04-08 PROCEDURE — 92960 CARDIOVERSION ELECTRIC EXT: CPT | Mod: ,,, | Performed by: INTERNAL MEDICINE

## 2021-04-08 PROCEDURE — 63600175 PHARM REV CODE 636 W HCPCS: Performed by: STUDENT IN AN ORGANIZED HEALTH CARE EDUCATION/TRAINING PROGRAM

## 2021-04-08 PROCEDURE — 93312 TRANSESOPHAGEAL ECHO (TEE) (CUPID ONLY): ICD-10-PCS | Mod: 26,,, | Performed by: INTERNAL MEDICINE

## 2021-04-08 PROCEDURE — 93005 ELECTROCARDIOGRAM TRACING: CPT

## 2021-04-08 PROCEDURE — 37000009 HC ANESTHESIA EA ADD 15 MINS: Performed by: INTERNAL MEDICINE

## 2021-04-08 PROCEDURE — D9220A PRA ANESTHESIA: ICD-10-PCS | Mod: CRNA,,, | Performed by: STUDENT IN AN ORGANIZED HEALTH CARE EDUCATION/TRAINING PROGRAM

## 2021-04-08 PROCEDURE — 93005 ELECTROCARDIOGRAM TRACING: CPT | Mod: 59

## 2021-04-08 PROCEDURE — D9220A PRA ANESTHESIA: ICD-10-PCS | Mod: ANES,,, | Performed by: ANESTHESIOLOGY

## 2021-04-08 PROCEDURE — 37000008 HC ANESTHESIA 1ST 15 MINUTES: Performed by: INTERNAL MEDICINE

## 2021-04-08 PROCEDURE — 25000003 PHARM REV CODE 250: Performed by: NURSE PRACTITIONER

## 2021-04-08 RX ORDER — PROPOFOL 10 MG/ML
VIAL (ML) INTRAVENOUS
Status: DISCONTINUED | OUTPATIENT
Start: 2021-04-08 | End: 2021-04-08

## 2021-04-08 RX ORDER — FLECAINIDE ACETATE 50 MG/1
50 TABLET ORAL EVERY 12 HOURS
Qty: 60 TABLET | Refills: 11 | Status: SHIPPED | OUTPATIENT
Start: 2021-04-08 | End: 2021-05-07

## 2021-04-08 RX ORDER — EPHEDRINE SULFATE 50 MG/ML
20 INJECTION, SOLUTION INTRAVENOUS ONCE
Status: COMPLETED | OUTPATIENT
Start: 2021-04-08 | End: 2021-04-08

## 2021-04-08 RX ORDER — ONDANSETRON 2 MG/ML
4 INJECTION INTRAMUSCULAR; INTRAVENOUS ONCE AS NEEDED
Status: DISCONTINUED | OUTPATIENT
Start: 2021-04-08 | End: 2021-04-08 | Stop reason: HOSPADM

## 2021-04-08 RX ORDER — VASOPRESSIN 20 [USP'U]/ML
INJECTION, SOLUTION INTRAMUSCULAR; SUBCUTANEOUS
Status: DISCONTINUED | OUTPATIENT
Start: 2021-04-08 | End: 2021-04-08

## 2021-04-08 RX ORDER — METOPROLOL SUCCINATE 50 MG/1
50 TABLET, EXTENDED RELEASE ORAL DAILY
Qty: 30 TABLET | Refills: 11 | Status: SHIPPED | OUTPATIENT
Start: 2021-04-08 | End: 2021-07-01

## 2021-04-08 RX ORDER — DEXMEDETOMIDINE HYDROCHLORIDE 100 UG/ML
INJECTION, SOLUTION INTRAVENOUS
Status: DISCONTINUED | OUTPATIENT
Start: 2021-04-08 | End: 2021-04-08

## 2021-04-08 RX ORDER — FENTANYL CITRATE 50 UG/ML
25 INJECTION, SOLUTION INTRAMUSCULAR; INTRAVENOUS EVERY 5 MIN PRN
Status: DISCONTINUED | OUTPATIENT
Start: 2021-04-08 | End: 2021-04-08 | Stop reason: HOSPADM

## 2021-04-08 RX ORDER — DIPHENHYDRAMINE HYDROCHLORIDE 50 MG/ML
25 INJECTION INTRAMUSCULAR; INTRAVENOUS EVERY 6 HOURS PRN
Status: DISCONTINUED | OUTPATIENT
Start: 2021-04-08 | End: 2021-04-08 | Stop reason: HOSPADM

## 2021-04-08 RX ORDER — SODIUM CHLORIDE 9 MG/ML
INJECTION, SOLUTION INTRAVENOUS CONTINUOUS PRN
Status: DISCONTINUED | OUTPATIENT
Start: 2021-04-08 | End: 2021-04-08

## 2021-04-08 RX ORDER — LIDOCAINE HYDROCHLORIDE 40 MG/ML
INJECTION, SOLUTION RETROBULBAR
Status: DISCONTINUED | OUTPATIENT
Start: 2021-04-08 | End: 2021-04-08

## 2021-04-08 RX ORDER — HYDROMORPHONE HYDROCHLORIDE 1 MG/ML
0.2 INJECTION, SOLUTION INTRAMUSCULAR; INTRAVENOUS; SUBCUTANEOUS EVERY 5 MIN PRN
Status: DISCONTINUED | OUTPATIENT
Start: 2021-04-08 | End: 2021-04-08 | Stop reason: HOSPADM

## 2021-04-08 RX ORDER — LIDOCAINE HYDROCHLORIDE 20 MG/ML
INJECTION, SOLUTION EPIDURAL; INFILTRATION; INTRACAUDAL; PERINEURAL
Status: DISCONTINUED | OUTPATIENT
Start: 2021-04-08 | End: 2021-04-08

## 2021-04-08 RX ORDER — PROPOFOL 10 MG/ML
VIAL (ML) INTRAVENOUS CONTINUOUS PRN
Status: DISCONTINUED | OUTPATIENT
Start: 2021-04-08 | End: 2021-04-08

## 2021-04-08 RX ADMIN — DEXMEDETOMIDINE HYDROCHLORIDE 20 MCG: 100 INJECTION, SOLUTION INTRAVENOUS at 11:04

## 2021-04-08 RX ADMIN — SODIUM CHLORIDE 1000 ML: 0.9 INJECTION, SOLUTION INTRAVENOUS at 10:04

## 2021-04-08 RX ADMIN — PROPOFOL 150 MCG/KG/MIN: 10 INJECTION, EMULSION INTRAVENOUS at 11:04

## 2021-04-08 RX ADMIN — EPHEDRINE SULFATE 20 MG: 50 INJECTION INTRAVENOUS at 01:04

## 2021-04-08 RX ADMIN — VASOPRESSIN 1 UNITS: 20 INJECTION INTRAVENOUS at 11:04

## 2021-04-08 RX ADMIN — SODIUM CHLORIDE: 9 INJECTION, SOLUTION INTRAVENOUS at 11:04

## 2021-04-08 RX ADMIN — PROPOFOL 30 MG: 10 INJECTION, EMULSION INTRAVENOUS at 11:04

## 2021-04-08 RX ADMIN — LIDOCAINE HYDROCHLORIDE 25 MG: 20 INJECTION, SOLUTION EPIDURAL; INFILTRATION; INTRACAUDAL; PERINEURAL at 11:04

## 2021-04-08 RX ADMIN — LIDOCAINE HYDROCHLORIDE 2 ML: 40 INJECTION, SOLUTION RETROBULBAR; TOPICAL at 11:04

## 2021-04-15 ENCOUNTER — HOSPITAL ENCOUNTER (OUTPATIENT)
Dept: CARDIOLOGY | Facility: HOSPITAL | Age: 76
Discharge: HOME | End: 2021-04-15
Attending: INTERNAL MEDICINE
Payer: MEDICARE

## 2021-04-15 DIAGNOSIS — I48.91 ATRIAL FIBRILLATION WITH RVR: ICD-10-CM

## 2021-04-16 ENCOUNTER — TELEPHONE (OUTPATIENT)
Dept: ELECTROPHYSIOLOGY | Facility: CLINIC | Age: 76
End: 2021-04-16

## 2021-04-28 ENCOUNTER — RESEARCH ENCOUNTER (OUTPATIENT)
Dept: RESEARCH | Facility: HOSPITAL | Age: 76
End: 2021-04-28

## 2021-04-28 ENCOUNTER — TELEPHONE (OUTPATIENT)
Dept: RESEARCH | Facility: HOSPITAL | Age: 76
End: 2021-04-28

## 2021-05-07 ENCOUNTER — OFFICE VISIT (OUTPATIENT)
Dept: ELECTROPHYSIOLOGY | Facility: CLINIC | Age: 76
End: 2021-05-07
Payer: MEDICARE

## 2021-05-07 ENCOUNTER — HOSPITAL ENCOUNTER (OUTPATIENT)
Dept: CARDIOLOGY | Facility: CLINIC | Age: 76
Discharge: HOME | End: 2021-05-07
Payer: MEDICARE

## 2021-05-07 ENCOUNTER — RESEARCH ENCOUNTER (OUTPATIENT)
Dept: RESEARCH | Facility: HOSPITAL | Age: 76
End: 2021-05-07

## 2021-05-07 VITALS
WEIGHT: 232.81 LBS | BODY MASS INDEX: 35.28 KG/M2 | DIASTOLIC BLOOD PRESSURE: 75 MMHG | SYSTOLIC BLOOD PRESSURE: 129 MMHG | HEART RATE: 78 BPM | HEIGHT: 68 IN

## 2021-05-07 DIAGNOSIS — I48.91 ATRIAL FIBRILLATION WITH RVR: ICD-10-CM

## 2021-05-07 DIAGNOSIS — I10 ESSENTIAL HYPERTENSION: ICD-10-CM

## 2021-05-07 DIAGNOSIS — I48.19 PERSISTENT ATRIAL FIBRILLATION: Primary | ICD-10-CM

## 2021-05-07 DIAGNOSIS — Z92.89 HISTORY OF CARDIOVERSION: ICD-10-CM

## 2021-05-07 PROCEDURE — 1126F AMNT PAIN NOTED NONE PRSNT: CPT | Mod: S$GLB,,, | Performed by: NURSE PRACTITIONER

## 2021-05-07 PROCEDURE — 3288F FALL RISK ASSESSMENT DOCD: CPT | Mod: CPTII,S$GLB,, | Performed by: NURSE PRACTITIONER

## 2021-05-07 PROCEDURE — 1159F PR MEDICATION LIST DOCUMENTED IN MEDICAL RECORD: ICD-10-PCS | Mod: S$GLB,,, | Performed by: NURSE PRACTITIONER

## 2021-05-07 PROCEDURE — 93010 RHYTHM STRIP: ICD-10-PCS | Mod: S$GLB,,, | Performed by: INTERNAL MEDICINE

## 2021-05-07 PROCEDURE — 99999 PR PBB SHADOW E&M-EST. PATIENT-LVL III: ICD-10-PCS | Mod: PBBFAC,,, | Performed by: NURSE PRACTITIONER

## 2021-05-07 PROCEDURE — 93005 ELECTROCARDIOGRAM TRACING: CPT | Mod: S$GLB,,, | Performed by: INTERNAL MEDICINE

## 2021-05-07 PROCEDURE — 1101F PR PT FALLS ASSESS DOC 0-1 FALLS W/OUT INJ PAST YR: ICD-10-PCS | Mod: CPTII,S$GLB,, | Performed by: NURSE PRACTITIONER

## 2021-05-07 PROCEDURE — 1126F PR PAIN SEVERITY QUANTIFIED, NO PAIN PRESENT: ICD-10-PCS | Mod: S$GLB,,, | Performed by: NURSE PRACTITIONER

## 2021-05-07 PROCEDURE — 99214 PR OFFICE/OUTPT VISIT, EST, LEVL IV, 30-39 MIN: ICD-10-PCS | Mod: S$GLB,,, | Performed by: NURSE PRACTITIONER

## 2021-05-07 PROCEDURE — 1159F MED LIST DOCD IN RCRD: CPT | Mod: S$GLB,,, | Performed by: NURSE PRACTITIONER

## 2021-05-07 PROCEDURE — 1101F PT FALLS ASSESS-DOCD LE1/YR: CPT | Mod: CPTII,S$GLB,, | Performed by: NURSE PRACTITIONER

## 2021-05-07 PROCEDURE — 93005 RHYTHM STRIP: ICD-10-PCS | Mod: S$GLB,,, | Performed by: INTERNAL MEDICINE

## 2021-05-07 PROCEDURE — 93010 ELECTROCARDIOGRAM REPORT: CPT | Mod: S$GLB,,, | Performed by: INTERNAL MEDICINE

## 2021-05-07 PROCEDURE — 99214 OFFICE O/P EST MOD 30 MIN: CPT | Mod: S$GLB,,, | Performed by: NURSE PRACTITIONER

## 2021-05-07 PROCEDURE — 99999 PR PBB SHADOW E&M-EST. PATIENT-LVL III: CPT | Mod: PBBFAC,,, | Performed by: NURSE PRACTITIONER

## 2021-05-07 PROCEDURE — 3288F PR FALLS RISK ASSESSMENT DOCUMENTED: ICD-10-PCS | Mod: CPTII,S$GLB,, | Performed by: NURSE PRACTITIONER

## 2021-05-13 ENCOUNTER — PATIENT MESSAGE (OUTPATIENT)
Dept: ELECTROPHYSIOLOGY | Facility: CLINIC | Age: 76
End: 2021-05-13

## 2021-05-13 ENCOUNTER — TELEPHONE (OUTPATIENT)
Dept: ELECTROPHYSIOLOGY | Facility: CLINIC | Age: 76
End: 2021-05-13

## 2021-05-13 DIAGNOSIS — I48.19 PERSISTENT ATRIAL FIBRILLATION: Primary | ICD-10-CM

## 2021-05-13 RX ORDER — FLECAINIDE ACETATE 100 MG/1
100 TABLET ORAL EVERY 12 HOURS
Qty: 60 TABLET | Refills: 11 | Status: SHIPPED | OUTPATIENT
Start: 2021-05-23 | End: 2021-06-02

## 2021-05-18 ENCOUNTER — LAB VISIT (OUTPATIENT)
Dept: LAB | Facility: HOSPITAL | Age: 76
End: 2021-05-18
Attending: INTERNAL MEDICINE
Payer: MEDICARE

## 2021-05-18 DIAGNOSIS — I48.19 PERSISTENT ATRIAL FIBRILLATION: ICD-10-CM

## 2021-05-18 LAB
ANION GAP SERPL CALC-SCNC: 5 MMOL/L (ref 8–16)
APTT BLDCRRT: 28.3 SEC (ref 21–32)
BASOPHILS # BLD AUTO: 0.04 K/UL (ref 0–0.2)
BASOPHILS NFR BLD: 0.8 % (ref 0–1.9)
CALCIUM SERPL-MCNC: 8.7 MG/DL (ref 8.7–10.5)
CHLORIDE SERPL-SCNC: 101 MMOL/L (ref 95–110)
CO2 SERPL-SCNC: 35 MMOL/L (ref 23–29)
CREAT SERPL-MCNC: 0.93 MG/DL (ref 0.5–1.4)
DIFFERENTIAL METHOD: ABNORMAL
EOSINOPHIL # BLD AUTO: 0.2 K/UL (ref 0–0.5)
EOSINOPHIL NFR BLD: 3.9 % (ref 0–8)
ERYTHROCYTE [DISTWIDTH] IN BLOOD BY AUTOMATED COUNT: 11.9 % (ref 11.5–14.5)
EST. GFR  (AFRICAN AMERICAN): >60 ML/MIN/1.73 M^2
EST. GFR  (NON AFRICAN AMERICAN): >60 ML/MIN/1.73 M^2
GLUCOSE SERPL-MCNC: 201 MG/DL (ref 70–110)
HCT VFR BLD AUTO: 36.5 % (ref 40–54)
HGB BLD-MCNC: 12.7 G/DL (ref 14–18)
IMM GRANULOCYTES # BLD AUTO: 0.01 K/UL (ref 0–0.04)
IMM GRANULOCYTES NFR BLD AUTO: 0.2 % (ref 0–0.5)
INR PPP: 1.2 (ref 0.8–1.2)
LYMPHOCYTES # BLD AUTO: 1.5 K/UL (ref 1–4.8)
LYMPHOCYTES NFR BLD: 31.5 % (ref 18–48)
MCH RBC QN AUTO: 35.6 PG (ref 27–31)
MCHC RBC AUTO-ENTMCNC: 34.8 G/DL (ref 32–36)
MCV RBC AUTO: 102 FL (ref 82–98)
MONOCYTES # BLD AUTO: 0.5 K/UL (ref 0.3–1)
MONOCYTES NFR BLD: 11.2 % (ref 4–15)
NEUTROPHILS # BLD AUTO: 2.5 K/UL (ref 1.8–7.7)
NEUTROPHILS NFR BLD: 52.4 % (ref 38–73)
NRBC BLD-RTO: 0 /100 WBC
PLATELET # BLD AUTO: 193 K/UL (ref 150–450)
PMV BLD AUTO: 10.7 FL (ref 9.2–12.9)
POTASSIUM SERPL-SCNC: 4 MMOL/L (ref 3.5–5.1)
PROTHROMBIN TIME: 12.2 SEC (ref 9–12.5)
RBC # BLD AUTO: 3.57 M/UL (ref 4.6–6.2)
SODIUM SERPL-SCNC: 141 MMOL/L (ref 136–145)
UUN UR-MCNC: 15 MG/DL (ref 2–20)
WBC # BLD AUTO: 4.82 K/UL (ref 3.9–12.7)

## 2021-05-18 PROCEDURE — 80048 BASIC METABOLIC PNL TOTAL CA: CPT | Mod: PO | Performed by: INTERNAL MEDICINE

## 2021-05-18 PROCEDURE — 85025 COMPLETE CBC W/AUTO DIFF WBC: CPT | Mod: PO | Performed by: INTERNAL MEDICINE

## 2021-05-18 PROCEDURE — 36415 COLL VENOUS BLD VENIPUNCTURE: CPT | Mod: PO | Performed by: INTERNAL MEDICINE

## 2021-05-18 PROCEDURE — 85610 PROTHROMBIN TIME: CPT | Mod: PO | Performed by: INTERNAL MEDICINE

## 2021-05-18 PROCEDURE — 85730 THROMBOPLASTIN TIME PARTIAL: CPT | Mod: PO | Performed by: INTERNAL MEDICINE

## 2021-05-24 ENCOUNTER — TELEPHONE (OUTPATIENT)
Dept: ELECTROPHYSIOLOGY | Facility: CLINIC | Age: 76
End: 2021-05-24

## 2021-05-25 ENCOUNTER — ANESTHESIA EVENT (OUTPATIENT)
Dept: MEDSURG UNIT | Facility: HOSPITAL | Age: 76
End: 2021-05-25
Payer: MEDICARE

## 2021-05-25 ENCOUNTER — RESEARCH ENCOUNTER (OUTPATIENT)
Dept: RESEARCH | Facility: HOSPITAL | Age: 76
End: 2021-05-25

## 2021-05-25 ENCOUNTER — ANESTHESIA (OUTPATIENT)
Dept: MEDSURG UNIT | Facility: HOSPITAL | Age: 76
End: 2021-05-25
Payer: MEDICARE

## 2021-05-25 ENCOUNTER — HOSPITAL ENCOUNTER (OUTPATIENT)
Facility: HOSPITAL | Age: 76
Discharge: HOME | End: 2021-05-25
Attending: INTERNAL MEDICINE | Admitting: INTERNAL MEDICINE
Payer: MEDICARE

## 2021-05-25 VITALS
RESPIRATION RATE: 24 BRPM | TEMPERATURE: 98 F | OXYGEN SATURATION: 100 % | WEIGHT: 240 LBS | SYSTOLIC BLOOD PRESSURE: 109 MMHG | DIASTOLIC BLOOD PRESSURE: 57 MMHG | HEIGHT: 68 IN | HEART RATE: 68 BPM | BODY MASS INDEX: 36.37 KG/M2

## 2021-05-25 DIAGNOSIS — I48.19 ATRIAL FIBRILLATION, PERSISTENT: ICD-10-CM

## 2021-05-25 DIAGNOSIS — I48.91 ATRIAL FIBRILLATION: ICD-10-CM

## 2021-05-25 LAB
POCT GLUCOSE: 156 MG/DL (ref 70–110)
POCT GLUCOSE: 159 MG/DL (ref 70–110)

## 2021-05-25 PROCEDURE — 93005 ELECTROCARDIOGRAM TRACING: CPT | Mod: Q1,59

## 2021-05-25 PROCEDURE — 37000008 HC ANESTHESIA 1ST 15 MINUTES: Performed by: INTERNAL MEDICINE

## 2021-05-25 PROCEDURE — 92960 CARDIOVERSION ELECTRIC EXT: CPT | Mod: ,,, | Performed by: INTERNAL MEDICINE

## 2021-05-25 PROCEDURE — D9220A PRA ANESTHESIA: Mod: ANES,,, | Performed by: ANESTHESIOLOGY

## 2021-05-25 PROCEDURE — D9220A PRA ANESTHESIA: ICD-10-PCS | Mod: ANES,,, | Performed by: ANESTHESIOLOGY

## 2021-05-25 PROCEDURE — 93010 EKG 12-LEAD: ICD-10-PCS | Mod: 76,,, | Performed by: INTERNAL MEDICINE

## 2021-05-25 PROCEDURE — 93010 ELECTROCARDIOGRAM REPORT: CPT | Mod: ,,, | Performed by: INTERNAL MEDICINE

## 2021-05-25 PROCEDURE — D9220A PRA ANESTHESIA: ICD-10-PCS | Mod: CRNA,,, | Performed by: NURSE ANESTHETIST, CERTIFIED REGISTERED

## 2021-05-25 PROCEDURE — D9220A PRA ANESTHESIA: Mod: CRNA,,, | Performed by: NURSE ANESTHETIST, CERTIFIED REGISTERED

## 2021-05-25 PROCEDURE — 37000009 HC ANESTHESIA EA ADD 15 MINS: Performed by: INTERNAL MEDICINE

## 2021-05-25 PROCEDURE — 92960 CARDIOVERSION ELECTRIC EXT: CPT | Performed by: INTERNAL MEDICINE

## 2021-05-25 PROCEDURE — 82962 GLUCOSE BLOOD TEST: CPT | Mod: Q1 | Performed by: INTERNAL MEDICINE

## 2021-05-25 PROCEDURE — 92960 PR CARDIOVERSION, ELECTIVE;EXTERN: ICD-10-PCS | Mod: ,,, | Performed by: INTERNAL MEDICINE

## 2021-05-25 PROCEDURE — 63600175 PHARM REV CODE 636 W HCPCS: Mod: Q1 | Performed by: NURSE ANESTHETIST, CERTIFIED REGISTERED

## 2021-05-25 PROCEDURE — 25000003 PHARM REV CODE 250: Performed by: NURSE ANESTHETIST, CERTIFIED REGISTERED

## 2021-05-25 RX ORDER — FENTANYL CITRATE 50 UG/ML
25 INJECTION, SOLUTION INTRAMUSCULAR; INTRAVENOUS EVERY 5 MIN PRN
Status: DISCONTINUED | OUTPATIENT
Start: 2021-05-25 | End: 2021-05-25 | Stop reason: HOSPADM

## 2021-05-25 RX ORDER — PROPOFOL 10 MG/ML
VIAL (ML) INTRAVENOUS
Status: DISCONTINUED | OUTPATIENT
Start: 2021-05-25 | End: 2021-05-25

## 2021-05-25 RX ORDER — HYDROMORPHONE HYDROCHLORIDE 1 MG/ML
0.2 INJECTION, SOLUTION INTRAMUSCULAR; INTRAVENOUS; SUBCUTANEOUS EVERY 5 MIN PRN
Status: DISCONTINUED | OUTPATIENT
Start: 2021-05-25 | End: 2021-05-25 | Stop reason: HOSPADM

## 2021-05-25 RX ORDER — ONDANSETRON 2 MG/ML
4 INJECTION INTRAMUSCULAR; INTRAVENOUS ONCE AS NEEDED
Status: DISCONTINUED | OUTPATIENT
Start: 2021-05-25 | End: 2021-05-25 | Stop reason: HOSPADM

## 2021-05-25 RX ORDER — LIDOCAINE HYDROCHLORIDE 20 MG/ML
INJECTION INTRAVENOUS
Status: DISCONTINUED | OUTPATIENT
Start: 2021-05-25 | End: 2021-05-25

## 2021-05-25 RX ADMIN — LIDOCAINE HYDROCHLORIDE 40 MG: 20 INJECTION, SOLUTION INTRAVENOUS at 09:05

## 2021-05-25 RX ADMIN — PROPOFOL 50 MG: 10 INJECTION, EMULSION INTRAVENOUS at 09:05

## 2021-05-25 RX ADMIN — SODIUM CHLORIDE: 0.9 INJECTION, SOLUTION INTRAVENOUS at 09:05

## 2021-05-27 ENCOUNTER — TELEPHONE (OUTPATIENT)
Dept: ELECTROPHYSIOLOGY | Facility: CLINIC | Age: 76
End: 2021-05-27

## 2021-05-31 ENCOUNTER — RESEARCH ENCOUNTER (OUTPATIENT)
Dept: RESEARCH | Facility: HOSPITAL | Age: 76
End: 2021-05-31

## 2021-05-31 ENCOUNTER — TELEPHONE (OUTPATIENT)
Dept: RESEARCH | Facility: HOSPITAL | Age: 76
End: 2021-05-31

## 2021-06-01 ENCOUNTER — HOSPITAL ENCOUNTER (OUTPATIENT)
Dept: CARDIOLOGY | Facility: HOSPITAL | Age: 76
Discharge: HOME | End: 2021-06-01
Attending: INTERNAL MEDICINE
Payer: MEDICARE

## 2021-06-01 DIAGNOSIS — I48.19 PERSISTENT ATRIAL FIBRILLATION: ICD-10-CM

## 2021-06-01 PROCEDURE — 93010 ELECTROCARDIOGRAM REPORT: CPT | Mod: ,,, | Performed by: INTERNAL MEDICINE

## 2021-06-01 PROCEDURE — 93005 ELECTROCARDIOGRAM TRACING: CPT | Mod: PO

## 2021-06-01 PROCEDURE — 93010 EKG 12-LEAD: ICD-10-PCS | Mod: ,,, | Performed by: INTERNAL MEDICINE

## 2021-06-02 ENCOUNTER — TELEPHONE (OUTPATIENT)
Dept: ELECTROPHYSIOLOGY | Facility: CLINIC | Age: 76
End: 2021-06-02

## 2021-06-02 DIAGNOSIS — I48.19 PERSISTENT ATRIAL FIBRILLATION: Primary | ICD-10-CM

## 2021-06-09 ENCOUNTER — HOSPITAL ENCOUNTER (OUTPATIENT)
Dept: CARDIOLOGY | Facility: HOSPITAL | Age: 76
Discharge: HOME | End: 2021-06-09
Attending: INTERNAL MEDICINE
Payer: MEDICARE

## 2021-06-09 ENCOUNTER — TELEPHONE (OUTPATIENT)
Dept: ELECTROPHYSIOLOGY | Facility: CLINIC | Age: 76
End: 2021-06-09

## 2021-06-09 DIAGNOSIS — I48.19 PERSISTENT ATRIAL FIBRILLATION: ICD-10-CM

## 2021-06-09 PROCEDURE — 93227 HOLTER MONITOR - 24 HOUR (CUPID ONLY): ICD-10-PCS | Mod: ,,, | Performed by: INTERNAL MEDICINE

## 2021-06-09 PROCEDURE — 93227 XTRNL ECG REC<48 HR R&I: CPT | Mod: ,,, | Performed by: INTERNAL MEDICINE

## 2021-06-09 PROCEDURE — 93225 XTRNL ECG REC<48 HRS REC: CPT | Mod: PO

## 2021-06-15 LAB
OHS CV EVENT MONITOR DAY: 0
OHS CV HOLTER LENGTH DECIMAL HOURS: 24
OHS CV HOLTER LENGTH HOURS: 24
OHS CV HOLTER LENGTH MINUTES: 0

## 2021-06-16 ENCOUNTER — TELEPHONE (OUTPATIENT)
Dept: ELECTROPHYSIOLOGY | Facility: CLINIC | Age: 76
End: 2021-06-16

## 2021-07-01 ENCOUNTER — OFFICE VISIT (OUTPATIENT)
Dept: ELECTROPHYSIOLOGY | Facility: CLINIC | Age: 76
End: 2021-07-01
Payer: MEDICARE

## 2021-07-01 ENCOUNTER — HOSPITAL ENCOUNTER (OUTPATIENT)
Dept: CARDIOLOGY | Facility: CLINIC | Age: 76
Discharge: HOME | End: 2021-07-01
Payer: MEDICARE

## 2021-07-01 VITALS
HEART RATE: 93 BPM | WEIGHT: 245.38 LBS | DIASTOLIC BLOOD PRESSURE: 67 MMHG | BODY MASS INDEX: 37.19 KG/M2 | SYSTOLIC BLOOD PRESSURE: 119 MMHG | HEIGHT: 68 IN

## 2021-07-01 DIAGNOSIS — E11.65 TYPE 2 DIABETES MELLITUS WITH HYPERGLYCEMIA, WITHOUT LONG-TERM CURRENT USE OF INSULIN: ICD-10-CM

## 2021-07-01 DIAGNOSIS — I48.19 PERSISTENT ATRIAL FIBRILLATION: Primary | ICD-10-CM

## 2021-07-01 DIAGNOSIS — I10 ESSENTIAL HYPERTENSION: ICD-10-CM

## 2021-07-01 DIAGNOSIS — I48.91 ATRIAL FIBRILLATION WITH RVR: ICD-10-CM

## 2021-07-01 PROBLEM — I48.21 PERMANENT ATRIAL FIBRILLATION: Status: ACTIVE | Noted: 2021-05-25

## 2021-07-01 PROCEDURE — 99999 PR PBB SHADOW E&M-EST. PATIENT-LVL III: ICD-10-PCS | Mod: PBBFAC,,, | Performed by: INTERNAL MEDICINE

## 2021-07-01 PROCEDURE — 93005 EKG 12-LEAD: ICD-10-PCS | Mod: S$GLB,,, | Performed by: INTERNAL MEDICINE

## 2021-07-01 PROCEDURE — 93010 EKG 12-LEAD: ICD-10-PCS | Mod: S$GLB,,, | Performed by: INTERNAL MEDICINE

## 2021-07-01 PROCEDURE — 3288F PR FALLS RISK ASSESSMENT DOCUMENTED: ICD-10-PCS | Mod: CPTII,S$GLB,, | Performed by: INTERNAL MEDICINE

## 2021-07-01 PROCEDURE — 93010 ELECTROCARDIOGRAM REPORT: CPT | Mod: S$GLB,,, | Performed by: INTERNAL MEDICINE

## 2021-07-01 PROCEDURE — 99214 OFFICE O/P EST MOD 30 MIN: CPT | Mod: S$GLB,,, | Performed by: INTERNAL MEDICINE

## 2021-07-01 PROCEDURE — 3288F FALL RISK ASSESSMENT DOCD: CPT | Mod: CPTII,S$GLB,, | Performed by: INTERNAL MEDICINE

## 2021-07-01 PROCEDURE — 1159F PR MEDICATION LIST DOCUMENTED IN MEDICAL RECORD: ICD-10-PCS | Mod: S$GLB,,, | Performed by: INTERNAL MEDICINE

## 2021-07-01 PROCEDURE — 93005 ELECTROCARDIOGRAM TRACING: CPT | Mod: S$GLB,,, | Performed by: INTERNAL MEDICINE

## 2021-07-01 PROCEDURE — 1159F MED LIST DOCD IN RCRD: CPT | Mod: S$GLB,,, | Performed by: INTERNAL MEDICINE

## 2021-07-01 PROCEDURE — 99214 PR OFFICE/OUTPT VISIT, EST, LEVL IV, 30-39 MIN: ICD-10-PCS | Mod: S$GLB,,, | Performed by: INTERNAL MEDICINE

## 2021-07-01 PROCEDURE — 99999 PR PBB SHADOW E&M-EST. PATIENT-LVL III: CPT | Mod: PBBFAC,,, | Performed by: INTERNAL MEDICINE

## 2021-07-01 PROCEDURE — 1101F PT FALLS ASSESS-DOCD LE1/YR: CPT | Mod: CPTII,S$GLB,, | Performed by: INTERNAL MEDICINE

## 2021-07-01 PROCEDURE — 1101F PR PT FALLS ASSESS DOC 0-1 FALLS W/OUT INJ PAST YR: ICD-10-PCS | Mod: CPTII,S$GLB,, | Performed by: INTERNAL MEDICINE

## 2021-07-01 RX ORDER — ALBUTEROL SULFATE 90 UG/1
AEROSOL, METERED RESPIRATORY (INHALATION)
COMMUNITY
Start: 2021-01-16

## 2021-07-01 RX ORDER — METOPROLOL SUCCINATE 100 MG/1
100 TABLET, EXTENDED RELEASE ORAL DAILY
Qty: 90 TABLET | Refills: 3 | Status: SHIPPED | OUTPATIENT
Start: 2021-07-01 | End: 2022-07-11 | Stop reason: SDUPTHER

## 2021-10-20 ENCOUNTER — TELEPHONE (OUTPATIENT)
Dept: RESEARCH | Facility: HOSPITAL | Age: 76
End: 2021-10-20

## 2021-10-20 ENCOUNTER — RESEARCH ENCOUNTER (OUTPATIENT)
Dept: RESEARCH | Facility: HOSPITAL | Age: 76
End: 2021-10-20

## 2022-03-22 ENCOUNTER — LAB VISIT (OUTPATIENT)
Dept: LAB | Facility: HOSPITAL | Age: 77
End: 2022-03-22
Attending: FAMILY MEDICINE
Payer: MEDICARE

## 2022-03-22 DIAGNOSIS — Z00.00 ROUTINE GENERAL MEDICAL EXAMINATION AT A HEALTH CARE FACILITY: Primary | ICD-10-CM

## 2022-03-22 DIAGNOSIS — E11.9 DIABETES MELLITUS WITHOUT COMPLICATION: ICD-10-CM

## 2022-03-22 DIAGNOSIS — E03.9 MYXEDEMA HEART DISEASE: ICD-10-CM

## 2022-03-22 DIAGNOSIS — Z12.5 SPECIAL SCREENING FOR MALIGNANT NEOPLASM OF PROSTATE: ICD-10-CM

## 2022-03-22 DIAGNOSIS — I10 ESSENTIAL HYPERTENSION, MALIGNANT: ICD-10-CM

## 2022-03-22 DIAGNOSIS — I51.9 MYXEDEMA HEART DISEASE: ICD-10-CM

## 2022-03-22 LAB
ALBUMIN SERPL BCP-MCNC: 4.1 G/DL (ref 3.5–5.2)
ALP SERPL-CCNC: 99 U/L (ref 38–126)
ALT SERPL W/O P-5'-P-CCNC: 40 U/L (ref 10–44)
ANION GAP SERPL CALC-SCNC: 11 MMOL/L (ref 8–16)
AST SERPL-CCNC: 48 U/L (ref 15–46)
BASOPHILS # BLD AUTO: 0.02 K/UL (ref 0–0.2)
BASOPHILS NFR BLD: 0.4 % (ref 0–1.9)
BILIRUB SERPL-MCNC: 1.9 MG/DL (ref 0.1–1)
BILIRUB UR QL STRIP: NEGATIVE
CALCIUM SERPL-MCNC: 9.3 MG/DL (ref 8.7–10.5)
CHLORIDE SERPL-SCNC: 101 MMOL/L (ref 95–110)
CHOLEST SERPL-MCNC: 144 MG/DL (ref 120–199)
CHOLEST/HDLC SERPL: 2.1 {RATIO} (ref 2–5)
CLARITY UR REFRACT.AUTO: CLEAR
CO2 SERPL-SCNC: 29 MMOL/L (ref 23–29)
COLOR UR AUTO: YELLOW
COMPLEXED PSA SERPL-MCNC: 1.2 NG/ML (ref 0–4)
CREAT SERPL-MCNC: 0.95 MG/DL (ref 0.5–1.4)
CREAT UR-MCNC: 38.2 MG/DL (ref 23–375)
DIFFERENTIAL METHOD: ABNORMAL
EOSINOPHIL # BLD AUTO: 0.1 K/UL (ref 0–0.5)
EOSINOPHIL NFR BLD: 2.8 % (ref 0–8)
ERYTHROCYTE [DISTWIDTH] IN BLOOD BY AUTOMATED COUNT: 12.2 % (ref 11.5–14.5)
EST. GFR  (AFRICAN AMERICAN): >60 ML/MIN/1.73 M^2
EST. GFR  (NON AFRICAN AMERICAN): >60 ML/MIN/1.73 M^2
GLUCOSE SERPL-MCNC: 151 MG/DL (ref 70–110)
GLUCOSE UR QL STRIP: NEGATIVE
HCT VFR BLD AUTO: 35.3 % (ref 40–54)
HDLC SERPL-MCNC: 69 MG/DL (ref 40–75)
HDLC SERPL: 47.9 % (ref 20–50)
HGB BLD-MCNC: 12 G/DL (ref 14–18)
HGB UR QL STRIP: ABNORMAL
IMM GRANULOCYTES # BLD AUTO: 0.02 K/UL (ref 0–0.04)
IMM GRANULOCYTES NFR BLD AUTO: 0.4 % (ref 0–0.5)
KETONES UR QL STRIP: NEGATIVE
LDLC SERPL CALC-MCNC: 63 MG/DL (ref 63–159)
LEUKOCYTE ESTERASE UR QL STRIP: NEGATIVE
LYMPHOCYTES # BLD AUTO: 1.2 K/UL (ref 1–4.8)
LYMPHOCYTES NFR BLD: 26.6 % (ref 18–48)
MCH RBC QN AUTO: 36.6 PG (ref 27–31)
MCHC RBC AUTO-ENTMCNC: 34 G/DL (ref 32–36)
MCV RBC AUTO: 108 FL (ref 82–98)
MONOCYTES # BLD AUTO: 0.5 K/UL (ref 0.3–1)
MONOCYTES NFR BLD: 10.6 % (ref 4–15)
NEUTROPHILS # BLD AUTO: 2.7 K/UL (ref 1.8–7.7)
NEUTROPHILS NFR BLD: 59.2 % (ref 38–73)
NITRITE UR QL STRIP: NEGATIVE
NONHDLC SERPL-MCNC: 75 MG/DL
NRBC BLD-RTO: 0 /100 WBC
PH UR STRIP: 7 [PH] (ref 5–8)
PLATELET # BLD AUTO: 217 K/UL (ref 150–450)
PMV BLD AUTO: 9.6 FL (ref 9.2–12.9)
POTASSIUM SERPL-SCNC: 4.5 MMOL/L (ref 3.5–5.1)
PROT SERPL-MCNC: 8.6 G/DL (ref 6–8.4)
PROT UR QL STRIP: NEGATIVE
RBC # BLD AUTO: 3.28 M/UL (ref 4.6–6.2)
SODIUM SERPL-SCNC: 141 MMOL/L (ref 136–145)
SP GR UR STRIP: 1.01 (ref 1–1.03)
T4 FREE SERPL-MCNC: 1.23 NG/DL (ref 0.71–1.51)
TRIGL SERPL-MCNC: 60 MG/DL (ref 30–150)
TSH SERPL DL<=0.005 MIU/L-ACNC: 1.13 UIU/ML (ref 0.4–4)
URN SPEC COLLECT METH UR: ABNORMAL
UROBILINOGEN UR STRIP-ACNC: NEGATIVE EU/DL
UUN UR-MCNC: 15 MG/DL (ref 2–20)
WBC # BLD AUTO: 4.62 K/UL (ref 3.9–12.7)

## 2022-03-22 PROCEDURE — 84153 ASSAY OF PSA TOTAL: CPT | Performed by: FAMILY MEDICINE

## 2022-03-22 PROCEDURE — 85025 COMPLETE CBC W/AUTO DIFF WBC: CPT | Mod: PO | Performed by: FAMILY MEDICINE

## 2022-03-22 PROCEDURE — 36415 COLL VENOUS BLD VENIPUNCTURE: CPT | Mod: PO | Performed by: FAMILY MEDICINE

## 2022-03-22 PROCEDURE — 80061 LIPID PANEL: CPT | Performed by: FAMILY MEDICINE

## 2022-03-22 PROCEDURE — 84443 ASSAY THYROID STIM HORMONE: CPT | Mod: PO | Performed by: FAMILY MEDICINE

## 2022-03-22 PROCEDURE — 83036 HEMOGLOBIN GLYCOSYLATED A1C: CPT | Performed by: FAMILY MEDICINE

## 2022-03-22 PROCEDURE — 82570 ASSAY OF URINE CREATININE: CPT | Mod: PO | Performed by: FAMILY MEDICINE

## 2022-03-22 PROCEDURE — 80053 COMPREHEN METABOLIC PANEL: CPT | Mod: PO | Performed by: FAMILY MEDICINE

## 2022-03-22 PROCEDURE — 81003 URINALYSIS AUTO W/O SCOPE: CPT | Mod: PO | Performed by: FAMILY MEDICINE

## 2022-03-22 PROCEDURE — 84439 ASSAY OF FREE THYROXINE: CPT | Performed by: FAMILY MEDICINE

## 2022-03-23 LAB
ESTIMATED AVG GLUCOSE: 128 MG/DL (ref 68–131)
HBA1C MFR BLD: 6.1 % (ref 4–5.6)

## 2022-07-11 DIAGNOSIS — I48.19 PERSISTENT ATRIAL FIBRILLATION: ICD-10-CM

## 2022-07-11 RX ORDER — METOPROLOL SUCCINATE 100 MG/1
100 TABLET, EXTENDED RELEASE ORAL DAILY
Qty: 90 TABLET | Refills: 3 | Status: SHIPPED | OUTPATIENT
Start: 2022-07-11 | End: 2023-10-10

## 2022-12-02 ENCOUNTER — PATIENT MESSAGE (OUTPATIENT)
Dept: RESEARCH | Facility: HOSPITAL | Age: 77
End: 2022-12-02
Payer: MEDICARE

## 2023-01-17 DIAGNOSIS — I50.9 HEART FAILURE: Primary | ICD-10-CM

## 2023-01-24 ENCOUNTER — HOSPITAL ENCOUNTER (OUTPATIENT)
Dept: CARDIOLOGY | Facility: HOSPITAL | Age: 78
Discharge: HOME | End: 2023-01-24
Attending: FAMILY MEDICINE
Payer: MEDICARE

## 2023-01-24 VITALS — BODY MASS INDEX: 37.13 KG/M2 | WEIGHT: 245 LBS | HEIGHT: 68 IN

## 2023-01-24 DIAGNOSIS — I50.9 HEART FAILURE: ICD-10-CM

## 2023-01-24 LAB
AV INDEX (PROSTH): 0.3
AV MEAN GRADIENT: 17 MMHG
AV PEAK GRADIENT: 24 MMHG
AV VALVE AREA: 1.14 CM2
AV VELOCITY RATIO: 0.28
BSA FOR ECHO PROCEDURE: 2.31 M2
CV ECHO LV RWT: 0.77 CM
DOP CALC AO PEAK VEL: 2.46 M/S
DOP CALC AO VTI: 50.9 CM
DOP CALC LVOT AREA: 3.8 CM2
DOP CALC LVOT DIAMETER: 2.2 CM
DOP CALC LVOT PEAK VEL: 0.7 M/S
DOP CALC LVOT STROKE VOLUME: 58.13 CM3
DOP CALCLVOT PEAK VEL VTI: 15.3 CM
ECHO LV POSTERIOR WALL: 1.54 CM (ref 0.6–1.1)
EJECTION FRACTION: 55 %
FRACTIONAL SHORTENING: 33 % (ref 28–44)
INTERVENTRICULAR SEPTUM: 1.54 CM (ref 0.6–1.1)
LA MAJOR: 6.98 CM
LA MINOR: 6.16 CM
LA WIDTH: 4.7 CM
LEFT ATRIUM SIZE: 5.46 CM
LEFT ATRIUM VOLUME INDEX: 64 ML/M2
LEFT ATRIUM VOLUME: 142.75 CM3
LEFT INTERNAL DIMENSION IN SYSTOLE: 2.71 CM (ref 2.1–4)
LEFT VENTRICLE DIASTOLIC VOLUME INDEX: 31.8 ML/M2
LEFT VENTRICLE DIASTOLIC VOLUME: 70.91 ML
LEFT VENTRICLE MASS INDEX: 110 G/M2
LEFT VENTRICLE SYSTOLIC VOLUME INDEX: 12.3 ML/M2
LEFT VENTRICLE SYSTOLIC VOLUME: 27.34 ML
LEFT VENTRICULAR INTERNAL DIMENSION IN DIASTOLE: 4.02 CM (ref 3.5–6)
LEFT VENTRICULAR MASS: 244.33 G
LVOT MG: 1.29 MMHG
LVOT MV: 0.54 CM/S
PISA MRMAX VEL: 4.23 M/S
PISA TR MAX VEL: 2.54 M/S
RA MAJOR: 6.19 CM
RIGHT VENTRICULAR END-DIASTOLIC DIMENSION: 3.14 CM
TR MAX PG: 26 MMHG

## 2023-01-24 PROCEDURE — 93306 TTE W/DOPPLER COMPLETE: CPT | Mod: PO

## 2023-01-24 PROCEDURE — 93306 ECHO (CUPID ONLY): ICD-10-PCS | Mod: 26,,, | Performed by: INTERNAL MEDICINE

## 2023-01-24 PROCEDURE — 93306 TTE W/DOPPLER COMPLETE: CPT | Mod: 26,,, | Performed by: INTERNAL MEDICINE

## 2023-02-01 ENCOUNTER — TELEPHONE (OUTPATIENT)
Dept: GASTROENTEROLOGY | Facility: CLINIC | Age: 78
End: 2023-02-01

## 2023-02-01 ENCOUNTER — OFFICE VISIT (OUTPATIENT)
Dept: GASTROENTEROLOGY | Facility: CLINIC | Age: 78
End: 2023-02-01
Payer: MEDICARE

## 2023-02-01 VITALS — BODY MASS INDEX: 37.51 KG/M2 | WEIGHT: 246.69 LBS

## 2023-02-01 DIAGNOSIS — D50.9 IRON DEFICIENCY ANEMIA, UNSPECIFIED IRON DEFICIENCY ANEMIA TYPE: Primary | ICD-10-CM

## 2023-02-01 PROCEDURE — 1126F AMNT PAIN NOTED NONE PRSNT: CPT | Mod: CPTII,S$GLB,,

## 2023-02-01 PROCEDURE — 1159F MED LIST DOCD IN RCRD: CPT | Mod: CPTII,S$GLB,,

## 2023-02-01 PROCEDURE — 1101F PT FALLS ASSESS-DOCD LE1/YR: CPT | Mod: CPTII,S$GLB,,

## 2023-02-01 PROCEDURE — 1159F PR MEDICATION LIST DOCUMENTED IN MEDICAL RECORD: ICD-10-PCS | Mod: CPTII,S$GLB,,

## 2023-02-01 PROCEDURE — 99205 PR OFFICE/OUTPT VISIT, NEW, LEVL V, 60-74 MIN: ICD-10-PCS | Mod: S$GLB,,,

## 2023-02-01 PROCEDURE — 99999 PR PBB SHADOW E&M-EST. PATIENT-LVL III: CPT | Mod: PBBFAC,,,

## 2023-02-01 PROCEDURE — 1101F PR PT FALLS ASSESS DOC 0-1 FALLS W/OUT INJ PAST YR: ICD-10-PCS | Mod: CPTII,S$GLB,,

## 2023-02-01 PROCEDURE — 99205 OFFICE O/P NEW HI 60 MIN: CPT | Mod: S$GLB,,,

## 2023-02-01 PROCEDURE — 1126F PR PAIN SEVERITY QUANTIFIED, NO PAIN PRESENT: ICD-10-PCS | Mod: CPTII,S$GLB,,

## 2023-02-01 PROCEDURE — 3288F PR FALLS RISK ASSESSMENT DOCUMENTED: ICD-10-PCS | Mod: CPTII,S$GLB,,

## 2023-02-01 PROCEDURE — 99999 PR PBB SHADOW E&M-EST. PATIENT-LVL III: ICD-10-PCS | Mod: PBBFAC,,,

## 2023-02-01 PROCEDURE — 3288F FALL RISK ASSESSMENT DOCD: CPT | Mod: CPTII,S$GLB,,

## 2023-02-01 RX ORDER — SODIUM, POTASSIUM,MAG SULFATES 17.5-3.13G
1 SOLUTION, RECONSTITUTED, ORAL ORAL DAILY
Qty: 1 KIT | Refills: 0 | Status: SHIPPED | OUTPATIENT
Start: 2023-02-01

## 2023-02-01 NOTE — PATIENT INSTRUCTIONS
SUPREP Instructions            [facility] [address]      Clinic Office [phone]      Endoscopy Lab [phone]      Endoscopy will call you 3-4 days before your procedure with you time of arrival and the time the procedure will start!     You are scheduled for a Colonoscopy with   on Tuesday 03/21/23 at [facility] in [place].        Check in at the Hospital -1st floor, Information desk.      Call [phone] to reschedule.            An adult friend/family member must come with you to drive you home.  You cannot drive, take a taxi, Uber/Lyft or bus to leave the Endoscopy Center alone.  If you do not have someone to drive you home, your test will be cancelled.            Please follow the directions of your doctor if you take any pills that thin your blood. If you take these meds: Aggrenox, Brilinta, Effient, Eliquis, Lovenox, Plavix, Pletal, Pradaxa, Ticilid, Xarelto or Coumadin, let the doctor's office know.            DON'T: On the morning of the test do not take insulin or pills for diabetes.            DO: On the morning of the test, do take any pills for blood pressure, heart, anti-rejection and or seizures with a small sip of water. Bring any inhalers with you.            To have a good prep, you must follow these instructions - please do not use the directions from the pharmacy.            The doctor will send a prescription for the SUPREP.                  The Day Before the test:            You can only drink CLEAR LIQUIDS the whole day before your test.  You can't eat any food for the whole day.            You CAN have:      Water, Coffee or decaf coffee (no milk or cream)      Tea      Soft drinks - regular and sugar free      Jello (green or yellow)      Apple Juice, white grape juice, white cranberry juice      Gatorade, Power Aid, Crystal Light, Shailesh Aid      Lemonade and Limeade      Bouillon, clear soup      Snowball, popsicles      YOU CAN'T  DRINK ANYTHING RED, PURPLE ORANGE OR BLUE      YOU CAN'T DRINK ALCOHOL      ONLY DRINK WHAT IS ON THE LIST                  At 5 pm the night before your test:            Pour the 1st bottle of SUPREP into the cup provided in the box. Add water to the line on the cup and mix well.  Drink the whole cup and then drink 2 more full cups of water over 1 hour.      This can be easier to drink if it is cold. You can mix it 20 minutes ahead of time and place in the refrigerator before you drink it.  You must drink it within 30-45 minutes of mixing it.  Do NOT pour the drink over ice.  You can drink it with a straw.            The Day of the test - We will call you 2 days before your test to tell you what time to get there.            5 hours before you come to the hospital (this may be in the middle of the night)      Pour the 2nd bottle of SUPREP into the cup provided in the box. Add water to the line on the cup and mix well.  Drink the whole cup and then drink 2 more full cups of water over 1 hour.      It might be easier to drink if it is cold. You can mix it 20 minutes ahead of time and place in the refrigerator before you drink it.  You must drink it within 30-45 minutes of mixing it.  Do NOT pour the drink over ice.  You can drink it with a straw.            YOU CAN'T EAT OR DRINK ANYTHING ELSE ONCE YOU FINISH THE PREP            Leave all valuables and jewelry at home. You will be at the hospital for 2-4 hours.            Call the Endoscopy department at [phone] with any questions about your procedure.

## 2023-02-01 NOTE — PROGRESS NOTES
GASTROENTEROLOGY CLINIC NOTE    Reason for visit: The encounter diagnosis was Iron deficiency anemia, unspecified iron deficiency anemia type.  Referring provider/PCP: Dylon Chase MD    HPI:  Philip Kam Sr. is a 77 y.o. male here today for iron deficiency anemia, he is accompanied by his wife. Upon chart review, pt has had low blood counts since 2011. He reports he recently got blood work from his PCP and was told his hgb was in the 8's. He denies any overt bleeding. He notes some increased fatigue. He denies any abdominal pain, change in his BM's, no unexplained weight loss, no acid reflux symptoms. Notes black stool but has been taking iron and pepto bismol. He had an UGI bleed more than 10 years ago, he was taking NSAIDs at that time. Last colonoscopy was about 5 years ago, normal per pt report.     Prior Endoscopy: completed at outside facilities, no records on file   EGD:  Colon:    (Portions of this note were dictated using voice recognition software and may contain dictation related errors in spelling/grammar/syntax not found on text review)    Review of Systems   Constitutional:  Positive for malaise/fatigue.   Gastrointestinal:  Negative for abdominal pain, blood in stool, heartburn and melena.     Past Medical History: has a past medical history of Anticoagulant long-term use, Anxiety, CHF (congestive heart failure), High cholesterol, Hypertension, Hypothyroidism, and Type 2 diabetes mellitus, uncontrolled.    Past Surgical History: has a past surgical history that includes Eye surgery; Colonoscopy; Tonsillectomy; Esophagus surgery (2012); Lumbar epidural injection (01/2018); Back surgery; Treatment of cardiac arrhythmia (N/A, 4/8/2021); and Treatment of cardiac arrhythmia (N/A, 5/25/2021).    Home medications:   Current Outpatient Medications on File Prior to Visit   Medication Sig Dispense Refill    albuterol (PROVENTIL/VENTOLIN HFA) 90 mcg/actuation inhaler       apixaban (ELIQUIS) 5  mg Tab Take 1 tablet (5 mg total) by mouth 2 (two) times daily. 60 tablet 11    furosemide (LASIX) 40 MG tablet Take 1 tablet (40 mg total) by mouth 2 (two) times a day. 60 tablet 0    glipiZIDE (GLUCOTROL) 5 MG TR24 Take 5 mg by mouth daily with breakfast.      levothyroxine (SYNTHROID) 100 MCG tablet Take 100 mcg by mouth once daily.       LORazepam (ATIVAN) 0.5 MG tablet Take 0.5 mg by mouth every 6 (six) hours as needed for Anxiety.      metformin (GLUCOPHAGE) 500 MG tablet Take 500 mg by mouth 2 (two) times daily with meals.      metoprolol succinate (TOPROL-XL) 100 MG 24 hr tablet Take 1 tablet (100 mg total) by mouth once daily. 90 tablet 3    multivitamin (THERAGRAN) per tablet Take 1 tablet by mouth once daily.      simvastatin (ZOCOR) 40 MG tablet Take 40 mg by mouth every evening.       Current Facility-Administered Medications on File Prior to Visit   Medication Dose Route Frequency Provider Last Rate Last Admin    sodium chloride 0.9% bolus 1,000 mL  1,000 mL Intravenous Once Ernestine Ahn NP           Vital signs:  Wt 111.9 kg (246 lb 11.1 oz)   BMI 37.51 kg/m²     Physical Exam  Constitutional:       Appearance: Normal appearance.   Neurological:      Mental Status: He is alert.     I have reviewed associated labs, imaging and notes.     Assessment:  1. Iron deficiency anemia, unspecified iron deficiency anemia type      SHAYE   Chart review shows SHAYE since 2011, recently got blood drawn by PCP and was told Hgb was in the 8's   Previous UGI bleed   No overt bleeding noted   Pt taking eliquis     Plan:  Orders Placed This Encounter    sodium,potassium,mag sulfates (SUPREP BOWEL PREP KIT) 17.5-3.13-1.6 gram SolR    Case Request Endoscopy: COLONOSCOPY, EGD (ESOPHAGOGASTRODUODENOSCOPY)     Schedule EGD/Colonoscopy to evaluate iron deficiency anemia   Suprep    Higher than average risk given patient is on anticoagulant and will need clearance to hold     Ginetet Dunn NP  [facility]  [place]

## 2023-03-14 ENCOUNTER — TELEPHONE (OUTPATIENT)
Dept: INTERVENTIONAL RADIOLOGY/VASCULAR | Facility: HOSPITAL | Age: 78
End: 2023-03-14
Payer: MEDICARE

## 2023-03-14 ENCOUNTER — TELEPHONE (OUTPATIENT)
Dept: GASTROENTEROLOGY | Facility: HOSPITAL | Age: 78
End: 2023-03-14
Payer: MEDICARE

## 2023-03-14 ENCOUNTER — HOSPITAL ENCOUNTER (EMERGENCY)
Facility: HOSPITAL | Age: 78
Discharge: HOME | End: 2023-03-14
Attending: EMERGENCY MEDICINE | Admitting: EMERGENCY MEDICINE
Payer: MEDICARE

## 2023-03-14 VITALS
RESPIRATION RATE: 23 BRPM | HEART RATE: 105 BPM | BODY MASS INDEX: 38.49 KG/M2 | TEMPERATURE: 98 F | SYSTOLIC BLOOD PRESSURE: 122 MMHG | WEIGHT: 254 LBS | OXYGEN SATURATION: 97 % | DIASTOLIC BLOOD PRESSURE: 72 MMHG | HEIGHT: 68 IN

## 2023-03-14 DIAGNOSIS — R14.0 ABDOMINAL DISTENSION: ICD-10-CM

## 2023-03-14 DIAGNOSIS — R18.8 OTHER ASCITES: Primary | ICD-10-CM

## 2023-03-14 DIAGNOSIS — R06.02 SHORTNESS OF BREATH: ICD-10-CM

## 2023-03-14 LAB
ALBUMIN SERPL BCP-MCNC: 3.3 G/DL (ref 3.5–5.2)
ALP SERPL-CCNC: 83 U/L (ref 38–126)
ALT SERPL W/O P-5'-P-CCNC: 26 U/L (ref 10–44)
ANION GAP SERPL CALC-SCNC: 6 MMOL/L (ref 8–16)
AST SERPL-CCNC: 34 U/L (ref 15–46)
BASOPHILS # BLD AUTO: 0.05 K/UL (ref 0–0.2)
BASOPHILS NFR BLD: 0.8 % (ref 0–1.9)
BILIRUB SERPL-MCNC: 1.4 MG/DL (ref 0.1–1)
CALCIUM SERPL-MCNC: 8.7 MG/DL (ref 8.7–10.5)
CHLORIDE SERPL-SCNC: 105 MMOL/L (ref 95–110)
CO2 SERPL-SCNC: 28 MMOL/L (ref 23–29)
CREAT SERPL-MCNC: 1.65 MG/DL (ref 0.5–1.4)
DIFFERENTIAL METHOD: ABNORMAL
EOSINOPHIL # BLD AUTO: 0.2 K/UL (ref 0–0.5)
EOSINOPHIL NFR BLD: 2.9 % (ref 0–8)
ERYTHROCYTE [DISTWIDTH] IN BLOOD BY AUTOMATED COUNT: 16.8 % (ref 11.5–14.5)
EST. GFR  (NO RACE VARIABLE): 42.5 ML/MIN/1.73 M^2
GLUCOSE SERPL-MCNC: 142 MG/DL (ref 70–110)
HCT VFR BLD AUTO: 33.3 % (ref 40–54)
HGB BLD-MCNC: 10.8 G/DL (ref 14–18)
IMM GRANULOCYTES # BLD AUTO: 0.03 K/UL (ref 0–0.04)
IMM GRANULOCYTES NFR BLD AUTO: 0.5 % (ref 0–0.5)
LYMPHOCYTES # BLD AUTO: 1.4 K/UL (ref 1–4.8)
LYMPHOCYTES NFR BLD: 22.1 % (ref 18–48)
MCH RBC QN AUTO: 30.1 PG (ref 27–31)
MCHC RBC AUTO-ENTMCNC: 32.4 G/DL (ref 32–36)
MCV RBC AUTO: 93 FL (ref 82–98)
MONOCYTES # BLD AUTO: 0.7 K/UL (ref 0.3–1)
MONOCYTES NFR BLD: 10.5 % (ref 4–15)
NEUTROPHILS # BLD AUTO: 4 K/UL (ref 1.8–7.7)
NEUTROPHILS NFR BLD: 63.2 % (ref 38–73)
NRBC BLD-RTO: 0 /100 WBC
NT-PROBNP SERPL-MCNC: 1740 PG/ML (ref 5–1800)
PLATELET # BLD AUTO: 275 K/UL (ref 150–450)
PMV BLD AUTO: 9.5 FL (ref 9.2–12.9)
POTASSIUM SERPL-SCNC: 4 MMOL/L (ref 3.5–5.1)
PROT SERPL-MCNC: 7.8 G/DL (ref 6–8.4)
RBC # BLD AUTO: 3.59 M/UL (ref 4.6–6.2)
SODIUM SERPL-SCNC: 139 MMOL/L (ref 136–145)
TROPONIN I SERPL-MCNC: 0.02 NG/ML (ref 0.01–0.03)
UUN UR-MCNC: 18 MG/DL (ref 2–20)
WBC # BLD AUTO: 6.3 K/UL (ref 3.9–12.7)

## 2023-03-14 PROCEDURE — 96374 THER/PROPH/DIAG INJ IV PUSH: CPT | Mod: ER

## 2023-03-14 PROCEDURE — 84484 ASSAY OF TROPONIN QUANT: CPT | Mod: ER | Performed by: PHYSICIAN ASSISTANT

## 2023-03-14 PROCEDURE — 80053 COMPREHEN METABOLIC PANEL: CPT | Mod: ER | Performed by: PHYSICIAN ASSISTANT

## 2023-03-14 PROCEDURE — 93005 ELECTROCARDIOGRAM TRACING: CPT | Mod: ER

## 2023-03-14 PROCEDURE — 93010 EKG 12-LEAD: ICD-10-PCS | Mod: ,,, | Performed by: INTERNAL MEDICINE

## 2023-03-14 PROCEDURE — 93010 ELECTROCARDIOGRAM REPORT: CPT | Mod: ,,, | Performed by: INTERNAL MEDICINE

## 2023-03-14 PROCEDURE — 83880 ASSAY OF NATRIURETIC PEPTIDE: CPT | Mod: ER | Performed by: PHYSICIAN ASSISTANT

## 2023-03-14 PROCEDURE — 99285 EMERGENCY DEPT VISIT HI MDM: CPT | Mod: 25,ER

## 2023-03-14 PROCEDURE — 63600175 PHARM REV CODE 636 W HCPCS: Mod: ER | Performed by: PHYSICIAN ASSISTANT

## 2023-03-14 PROCEDURE — 99900035 HC TECH TIME PER 15 MIN (STAT): Mod: ER

## 2023-03-14 PROCEDURE — 94760 N-INVAS EAR/PLS OXIMETRY 1: CPT | Mod: ER

## 2023-03-14 PROCEDURE — 85025 COMPLETE CBC W/AUTO DIFF WBC: CPT | Mod: ER | Performed by: PHYSICIAN ASSISTANT

## 2023-03-14 RX ORDER — GLIPIZIDE 5 MG/1
5 TABLET ORAL DAILY
COMMUNITY
Start: 2022-12-21

## 2023-03-14 RX ORDER — LANOLIN ALCOHOL/MO/W.PET/CERES
1 CREAM (GRAM) TOPICAL
COMMUNITY

## 2023-03-14 RX ORDER — MULTIVIT WITH MINERALS/HERBS
1 TABLET ORAL DAILY
COMMUNITY

## 2023-03-14 RX ORDER — PANTOPRAZOLE SODIUM 40 MG/1
40 TABLET, DELAYED RELEASE ORAL DAILY
COMMUNITY
Start: 2023-01-25

## 2023-03-14 RX ORDER — FOLIC ACID 0.4 MG
400 TABLET ORAL DAILY
COMMUNITY

## 2023-03-14 RX ORDER — FUROSEMIDE 10 MG/ML
40 INJECTION INTRAMUSCULAR; INTRAVENOUS
Status: COMPLETED | OUTPATIENT
Start: 2023-03-14 | End: 2023-03-14

## 2023-03-14 RX ADMIN — FUROSEMIDE 40 MG: 10 INJECTION, SOLUTION INTRAMUSCULAR; INTRAVENOUS at 10:03

## 2023-03-14 NOTE — DISCHARGE INSTRUCTIONS
Today your CT scan shows ascites in your abdomen.  This needs to be closely monitored over the next several days by your PCP and they may make changes to your medications or choose to have a procedure scheduled as an outpatient to remove the fluid.  Your kidney function is slightly elevated as well (creatinine).  This should be monitored with blood work in the next week or so as well.  Please return with any new or worsening symptoms.

## 2023-03-14 NOTE — TELEPHONE ENCOUNTER
Thanks  I cant do the egd / colon without having him tapped prior    I will place referral to IR...    Cherelle, needs referral to IR  If he cant get paracentesis prior to upcoming scopes next Tuesday, we will have to postpone...

## 2023-03-14 NOTE — TELEPHONE ENCOUNTER
----- Message from Dylon Chase MD sent at 3/14/2023  2:39 PM CDT -----  Jakub Palacios was in hte ER today for sob and found to have ascites  I am due to see him tomorrow - already on lasix  Will likely add some aldactone  He won't stop drinking alcohol despite my urging  He's on your schedule next week for scope   May need to set him up with IR for paracentesis not responding to oral therapy  Will see tomorrow and wanda know

## 2023-03-14 NOTE — PHARMACY MED REC
"    [facility]           Pharmacy  Admission Medication History     The home medication history was taken by Rhonda Verduzco.      Medication history obtained from Medications listed below were obtained from: Patient/family and Medications brought from home    Based on information gathered for medication list, you may go to "Admission" then "Reconcile Home Medications" tabs to review and/or act upon those items.     The home medication list has been updated by the Pharmacy department.   Please read ALL comments highlighted in yellow.   Please address this information as you see fit.    Feel free to contact us if you have any questions or require assistance.    The medications listed below were removed from the home medication list.  Please reorder if appropriate:    Patient reports NOT TAKING the following medication(s):  multivitamin      Current Facility-Administered Medications on File Prior to Encounter   Medication Dose Route Frequency Provider Last Rate Last Admin    sodium chloride 0.9% bolus 1,000 mL  1,000 mL Intravenous Once Ernestine Ahn NP         Current Outpatient Medications on File Prior to Encounter   Medication Sig Dispense Refill    albuterol (PROVENTIL/VENTOLIN HFA) 90 mcg/actuation inhaler       apixaban (ELIQUIS) 5 mg Tab Take 1 tablet (5 mg total) by mouth 2 (two) times daily. 60 tablet 11    b complex vitamins tablet Take 1 tablet by mouth once daily.      ferrous sulfate (FEOSOL) Tab tablet Take 1 tablet by mouth daily with breakfast.      folic acid (FOLVITE) 400 MCG tablet Take 400 mcg by mouth once daily.      furosemide (LASIX) 40 MG tablet Take 1 tablet (40 mg total) by mouth 2 (two) times a day. 60 tablet 0    glipiZIDE (GLUCOTROL) 5 MG tablet Take 5 mg by mouth once daily.      levothyroxine (SYNTHROID) 100 MCG tablet Take 100 mcg by mouth once daily.       LORazepam (ATIVAN) 0.5 MG tablet Take 0.5 mg by mouth every 6 (six) hours as needed for Anxiety.      " metformin (GLUCOPHAGE) 500 MG tablet Take 500 mg by mouth 2 (two) times daily with meals.      metoprolol succinate (TOPROL-XL) 100 MG 24 hr tablet Take 1 tablet (100 mg total) by mouth once daily. 90 tablet 3    pantoprazole (PROTONIX) 40 MG tablet Take 40 mg by mouth once daily.      simvastatin (ZOCOR) 40 MG tablet Take 40 mg by mouth every evening.      sodium,potassium,mag sulfates (SUPREP BOWEL PREP KIT) 17.5-3.13-1.6 gram SolR Take 177 mLs by mouth once daily. 1 kit 0       Please address this information as you see fit.  Feel free to contact us if you have any questions or require assistance.    Rhonda Verduzco  [phone]              .

## 2023-03-14 NOTE — ED PROVIDER NOTES
Encounter Date: 3/14/2023       History     Chief Complaint   Patient presents with    Shortness of Breath     Pt sent to ED by PCP for CHF exacerbation.      HPI: Philip Kam Sr., a 77 y.o. male  has a past medical history of Anticoagulant long-term use, Anxiety, CHF (congestive heart failure), High cholesterol, Hypertension, Hypothyroidism, and Type 2 diabetes mellitus, uncontrolled.     He presents to the ED for evaluation of SOB that has waxed and waned for the last 2 months.  States that his last exacerbation resulted in increase in his lasix with subsequent decreased over the last 3 weeks.  Attests to abdominal distention.  Denies CP, cough.        The history is provided by the patient.   Review of patient's allergies indicates:   Allergen Reactions    Codeine Itching     Past Medical History:   Diagnosis Date    Anticoagulant long-term use     Anxiety     CHF (congestive heart failure)     High cholesterol     Hypertension     Hypothyroidism     Type 2 diabetes mellitus, uncontrolled      Past Surgical History:   Procedure Laterality Date    BACK SURGERY      COLONOSCOPY      ESOPHAGUS SURGERY  2012    EYE SURGERY      LUMBAR EPIDURAL INJECTION  01/2018    x2    TONSILLECTOMY      TREATMENT OF CARDIAC ARRHYTHMIA N/A 4/8/2021    Procedure: CARDIOVERSION;  Surgeon: Vinayak Eric MD;  Location: [facility] EP LAB;  Service: Cardiology;  Laterality: N/A;  a fib, dccv/juanito, mac, pr,, sscu    TREATMENT OF CARDIAC ARRHYTHMIA N/A 5/25/2021    Procedure: CARDIOVERSION;  Surgeon: Vinayak Eric MD;  Location: [facility] EP LAB;  Service: Cardiology;  Laterality: N/A;  AF, JUANITO (Cx if compliant), DCCV, MAC, [place], 3Prep     Family History   Problem Relation Age of Onset    Heart attack Son      Social History     Tobacco Use    Smoking status: Never    Smokeless tobacco: Never   Substance Use Topics    Alcohol use: Yes     Alcohol/week: 1.0 standard drink     Types: 1 Cans of beer per week     Comment: Everyother day_Pt  "stated,"I drink a few beers."    Drug use: No     Review of Systems   Constitutional:  Negative for fever.   HENT:  Negative for congestion.    Respiratory:  Positive for shortness of breath. Negative for cough.    Cardiovascular:  Positive for leg swelling. Negative for chest pain.   Gastrointestinal:  Positive for abdominal distention. Negative for abdominal pain.   Skin:  Negative for color change and rash.   Allergic/Immunologic: Negative for immunocompromised state.   Neurological:  Negative for weakness.   Hematological:  Negative for adenopathy.   Psychiatric/Behavioral:  Negative for agitation.    All other systems reviewed and are negative.    Physical Exam     Initial Vitals [03/14/23 0917]   BP Pulse Resp Temp SpO2   129/88 108 20 97.6 °F (36.4 °C) 97 %      MAP       --         Physical Exam    Nursing note and vitals reviewed.  Constitutional: He appears well-developed and well-nourished.   HENT:   Head: Normocephalic and atraumatic.   Right Ear: External ear normal.   Left Ear: External ear normal.   Nose: Nose normal.   Mouth/Throat: Oropharynx is clear and moist.   Eyes: EOM are normal.   Neck: Neck supple.   Normal range of motion.  Cardiovascular:  Regular rhythm.   Tachycardia present.         Pulmonary/Chest: Breath sounds normal. No respiratory distress. He has no wheezes. He has no rhonchi. He has no rales.   Abdominal: Abdomen is soft. Bowel sounds are normal. He exhibits distension. There is no abdominal tenderness. There is no rebound and no guarding.   Musculoskeletal:         General: Edema (2+ bilaterally) present. No tenderness. Normal range of motion.      Cervical back: Normal range of motion and neck supple.     Lymphadenopathy:     He has no cervical adenopathy.   Neurological: He is alert and oriented to person, place, and time. No cranial nerve deficit.   Skin: Skin is warm and dry. Capillary refill takes less than 2 seconds. No rash and no abscess noted. No erythema. "   Psychiatric: He has a normal mood and affect. Thought content normal.       ED Course   Procedures  Labs Reviewed   CBC W/ AUTO DIFFERENTIAL - Abnormal; Notable for the following components:       Result Value    RBC 3.59 (*)     Hemoglobin 10.8 (*)     Hematocrit 33.3 (*)     RDW 16.8 (*)     All other components within normal limits   COMPREHENSIVE METABOLIC PANEL - Abnormal; Notable for the following components:    Glucose 142 (*)     Creatinine 1.65 (*)     Albumin 3.3 (*)     Total Bilirubin 1.4 (*)     Anion Gap 6 (*)     eGFR 42.5 (*)     All other components within normal limits   TROPONIN I   NT-PRO NATRIURETIC PEPTIDE        ECG Results              EKG 12-lead (In process)  Result time 03/14/23 10:20:24      In process by Interface, Lab In [facility] (03/14/23 10:20:24)                   Narrative:    Test Reason : R06.02,    Vent. Rate : 108 BPM     Atrial Rate : 131 BPM     P-R Int : 000 ms          QRS Dur : 092 ms      QT Int : 340 ms       P-R-T Axes : 000 069 -01 degrees     QTc Int : 455 ms    Atrial fibrillation with rapid ventricular response  Cannot rule out Anterior infarct ,age undetermined  Abnormal ECG  When compared with ECG of 01-JUL-2021 13:29,  Minimal criteria for Anterior infarct are now Present    Referred By: [email or web address]   SELF           Confirmed By:                                   Imaging Results              CT Abdomen Pelvis  Without Contrast (Final result)  Result time 03/14/23 11:01:36      Final result by Rowdy Orr MD (03/14/23 11:01:36)                   Impression:      1. Cirrhotic diminutive liver with nodular surface contour and moderate to large volume ascites. Mild body wall edema. Small left pleural effusion. Patchy bibasilar ground-glass opacities, pulmonary edema versus small airway process with air trapping/mosaic perfusion.  Mild splenomegaly, 14 cm.  2. No bowel obstruction.  No focal intra-abdominal inflammatory process.  3. Bilateral symmetric renal  atrophy/cortical thinning.  No hydronephrosis.  4. Contracted gallbladder with hyperdense lumen, question inspissated sludge.  No discrete gallstone.  No evidence of gallbladder inflammation.  All CT scans at this facility are performed  using dose modulation techniques as appropriate to performed exam including the following:  automated exposure control; adjustment of mA and/or kV according to the patients size (this includes techniques or standardized protocols for targeted exams where dose is matched to indication/reason for exam: i.e. extremities or head);  iterative reconstruction technique.      Electronically signed by: Rowdy Orr MD  Date:    03/14/2023  Time:    11:01               Narrative:    EXAMINATION:  CT ABDOMEN PELVIS WITHOUT CONTRAST    CLINICAL HISTORY:  Bowel obstruction suspected;abdominal distention;    TECHNIQUE:  Abdominal and pelvic CT without contrast.  Coronal and sagittal reformations.    COMPARISON:  None    FINDINGS:  Abdominal CT.  Small dependent left pleural effusions.  Patchy bibasilar ground-glass opacities, question pulmonary edema versus small airway process with air trapping/mosaic perfusion.  No dense airspace consolidation.  Normal size heart.  Extensive coronary artery calcification.  Aortic valve calcification.    Cirrhotic diminutive liver with nodular surface contour.  Negative for liver mass.    Moderate to large volume ascites.    Mildly enlarged spleen, 14 cm.    Pancreas and adrenal glands are normal.    Bilaterally symmetric renal atrophy bilateral symmetric renal atrophy/cortical thinning.  No hydronephrosis.  Mild bilateral perinephric stranding.  The kidneys are otherwise unremarkable.    Normal caliber atherosclerotic aorta.  No lymphadenopathy.    Contracted gallbladder with hyperdense lumen, question inspissated sludge.  No discrete gallstone.  No evidence of gallbladder inflammation.  Normal bile ducts.    No bowel obstruction.  Normal appendix.  The GI  tract is otherwise unremarkable.    Multilevel advanced lumbar degenerative disc disease sequela.    Pelvic CT.  The pelvic ring is intact.  Mild degenerative hips.  Pelvic bowel loops are unremarkable.  Ureters and urinary bladder are normal.  Normal sized prostate gland.    Ascites.    Atherosclerosis.    Mild abdominal and pelvic body wall edema.                                       X-Ray Chest AP Portable (Final result)  Result time 03/14/23 09:59:22      Final result by Rowdy Orr MD (03/14/23 09:59:22)                   Impression:      See discussion above      Electronically signed by: Rowdy Orr MD  Date:    03/14/2023  Time:    09:59               Narrative:    EXAMINATION:  XR CHEST AP PORTABLE    CLINICAL HISTORY:  CHF;    FINDINGS:  Comparison study 03/13/2021.    Shallow breath.  Heart is mildly enlarged.  There is crowding of pulmonary vasculature with coarsening of the interstitium, likely accentuated due to the shallow breath, however, superimposed interstitial scarring and or minimal pulmonary edema not excluded.  Faint bibasilar opacities, right greater than left, question atelectasis versus low-grade infiltrate.  No pleural fluid collection.                                       Medications   furosemide injection 40 mg (40 mg Intravenous Given 3/14/23 1053)     Medical Decision Making:   Initial Assessment:   SOB, abdominal distension  Differential Diagnosis:   CHF exacerbation, SBO, right heart strain  Clinical Tests:   Lab Tests: Reviewed and Ordered  The following lab test(s) were unremarkable: CBC, CMP, Troponin and BNP  Radiological Study: Ordered and Reviewed  ED Management:  Pt presents to ED for evaluation of SBO and abdominal distention.  Mild tachycardia noted.  No significant abnormalities on blood work including CBC, CMP, trop, BNP.  CXR with some pulmonary vascular congestion, likely d/u shallow breath.  CT concerning for ascites.  Normal ambulatory O2.  Pt instructed to have  close f/u with PCP for monitoring ascites, given appointment with PCP tomorrrow to discuss treatment.  Encouraged to return with new or worsening symptoms.  Case discussed with supervising physician who agrees with plan.                            Clinical Impression:   Final diagnoses:  [R06.02] Shortness of breath  [R14.0] Abdominal distension  [R18.8] Other ascites (Primary)        ED Disposition Condition    Discharge Stable          ED Prescriptions    None       Follow-up Information       Follow up With Specialties Details Why Contact Info    Dylon Chase MD Family Medicine   [address]  [address]  [facility] [address]  [phone]               Trinity Peñaloza PA-C  03/14/23 1594

## 2023-03-14 NOTE — ED NOTES
Ambulatory O2 saturation done--95% on room air at rest.  Pt. Maintained saturations of 95-98% throughout ambulation although he is short of breath.  MD and PA notified.

## 2023-03-16 ENCOUNTER — TELEPHONE (OUTPATIENT)
Dept: INTERVENTIONAL RADIOLOGY/VASCULAR | Facility: HOSPITAL | Age: 78
End: 2023-03-16
Payer: MEDICARE

## 2023-03-16 NOTE — NURSING
Message stated to arrive to [facility] at 03/17 at 1430 for procedure. Instructed to eat, drink, and take medications as usual.

## 2023-03-17 ENCOUNTER — TELEPHONE (OUTPATIENT)
Dept: ENDOSCOPY | Facility: HOSPITAL | Age: 78
End: 2023-03-17
Payer: MEDICARE

## 2023-03-17 ENCOUNTER — HOSPITAL ENCOUNTER (OUTPATIENT)
Dept: INTERVENTIONAL RADIOLOGY/VASCULAR | Facility: HOSPITAL | Age: 78
Discharge: HOME | End: 2023-03-17
Attending: INTERNAL MEDICINE
Payer: MEDICARE

## 2023-03-17 VITALS
OXYGEN SATURATION: 98 % | SYSTOLIC BLOOD PRESSURE: 115 MMHG | BODY MASS INDEX: 38.49 KG/M2 | HEIGHT: 68 IN | TEMPERATURE: 98 F | WEIGHT: 254 LBS | HEART RATE: 94 BPM | DIASTOLIC BLOOD PRESSURE: 72 MMHG | RESPIRATION RATE: 15 BRPM

## 2023-03-17 DIAGNOSIS — R18.8 OTHER ASCITES: ICD-10-CM

## 2023-03-17 LAB
ALBUMIN FLD-MCNC: 0.6 G/DL
APPEARANCE FLD: CLEAR
BODY FLD TYPE: NORMAL
COLOR FLD: COLORLESS
GRAM STN SPEC: NORMAL
GRAM STN SPEC: NORMAL
LYMPHOCYTES NFR FLD MANUAL: 7 %
MONOS+MACROS NFR FLD MANUAL: 87 %
NEUTROPHILS NFR FLD MANUAL: 6 %
PROT FLD-MCNC: 1.2 G/DL
SPECIMEN SOURCE: NORMAL
SPECIMEN SOURCE: NORMAL
WBC # FLD: 303 /CU MM

## 2023-03-17 PROCEDURE — A4215 STERILE NEEDLE: HCPCS

## 2023-03-17 PROCEDURE — 49083 ABD PARACENTESIS W/IMAGING: CPT | Performed by: RADIOLOGY

## 2023-03-17 PROCEDURE — 25000003 PHARM REV CODE 250: Performed by: RADIOLOGY

## 2023-03-17 PROCEDURE — 84157 ASSAY OF PROTEIN OTHER: CPT | Performed by: INTERNAL MEDICINE

## 2023-03-17 PROCEDURE — 87205 SMEAR GRAM STAIN: CPT | Performed by: INTERNAL MEDICINE

## 2023-03-17 PROCEDURE — A4550 SURGICAL TRAYS: HCPCS

## 2023-03-17 PROCEDURE — 49083 IR PARACENTESIS WITH IMAGING: ICD-10-PCS | Mod: ,,, | Performed by: RADIOLOGY

## 2023-03-17 PROCEDURE — 89051 BODY FLUID CELL COUNT: CPT | Performed by: INTERNAL MEDICINE

## 2023-03-17 PROCEDURE — 82042 OTHER SOURCE ALBUMIN QUAN EA: CPT | Performed by: INTERNAL MEDICINE

## 2023-03-17 RX ORDER — LIDOCAINE HYDROCHLORIDE 10 MG/ML
INJECTION INFILTRATION; PERINEURAL
Status: COMPLETED | OUTPATIENT
Start: 2023-03-17 | End: 2023-03-17

## 2023-03-17 RX ADMIN — LIDOCAINE HYDROCHLORIDE 5 ML: 10 INJECTION, SOLUTION INFILTRATION; PERINEURAL at 03:03

## 2023-03-17 NOTE — PROCEDURES
Radiology Post-Procedure Note    Pre Op Diagnosis: Ascites  Post Op Diagnosis: Same    Procedure: Paracentesis    Procedure performed by: John Graham MD    Written Informed Consent Obtained: Yes  Specimen Removed: YES  Estimated Blood Loss: Minimal    Findings:   Successful paracentesis.    Patient tolerated procedure well.    John Graham MD  Diagnostic and Interventional Radiologist  Department of Radiology  Pager: [phone]

## 2023-03-17 NOTE — PLAN OF CARE
Patient discharged home in stable condition. 5000 mL of cloudy light yellow peritoneal fluid was drained. Since this was the patient's first paracentesis, protocol states to stop at 5 liters, therefore he was not completely drained. AVS provided and reviewed with patient and spouse. Specimens obtained and brought to lab for processing. Patient brought to vehicle via wheelchair. IR care complete.

## 2023-03-17 NOTE — TELEPHONE ENCOUNTER
Spoke with patient about arrival time @ 1200.   EGD/Colon    Prep instructions reviewed: the day before the procedure, follow a clear liquid diet all day, then start the first 1/2 of prep at 5pm and take 2nd 1/2 of prep @ 0700.  Pt must be completely NPO when prep completed @ 0900.               Medications: Do not take Insulin or oral diabetic medications the day of the procedure.  Take as prescribed: heart, seizure and blood pressure medication in the morning with a sip of water (less than an ounce).  Take any breathing medications and bring inhalers to hospital with you Leave all valuables and jewelry at home.     Wear comfortable clothes to procedure to change into hospital gown You cannot drive for 24 hours after your procedure because you will receive sedation for your procedure to make you comfortable.  A ride must be provided at discharge.

## 2023-03-17 NOTE — H&P
Radiology History & Physical      SUBJECTIVE:     Chief Complaint: Paracentesis    History of Present Illness:  Philip Kam Sr. is a 77 y.o. male with ascites who presents for paracentesis.    Past Medical History:   Diagnosis Date    Anticoagulant long-term use     Anxiety     CHF (congestive heart failure)     High cholesterol     Hypertension     Hypothyroidism     Type 2 diabetes mellitus, uncontrolled      Past Surgical History:   Procedure Laterality Date    BACK SURGERY      COLONOSCOPY      ESOPHAGUS SURGERY  2012    EYE SURGERY      LUMBAR EPIDURAL INJECTION  01/2018    x2    TONSILLECTOMY      TREATMENT OF CARDIAC ARRHYTHMIA N/A 4/8/2021    Procedure: CARDIOVERSION;  Surgeon: Vinayak Eric MD;  Location: [facility] EP LAB;  Service: Cardiology;  Laterality: N/A;  a fib, dccv/hilario, mac, pr,, sscu    TREATMENT OF CARDIAC ARRHYTHMIA N/A 5/25/2021    Procedure: CARDIOVERSION;  Surgeon: Vinayak Eric MD;  Location: [facility] EP LAB;  Service: Cardiology;  Laterality: N/A;  AF, HILARIO (Cx if compliant), DCCV, MAC, [place], 3Prep       Home Meds:   Prior to Admission medications    Medication Sig Start Date End Date Taking? Authorizing Provider   albuterol (PROVENTIL/VENTOLIN HFA) 90 mcg/actuation inhaler  1/16/21  Yes Historical Provider   apixaban (ELIQUIS) 5 mg Tab Take 1 tablet (5 mg total) by mouth 2 (two) times daily. 5/19/22  Yes Vinayak Eric MD   b complex vitamins tablet Take 1 tablet by mouth once daily.   Yes Historical Provider   ferrous sulfate (FEOSOL) Tab tablet Take 1 tablet by mouth daily with breakfast.   Yes Historical Provider   folic acid (FOLVITE) 400 MCG tablet Take 400 mcg by mouth once daily.   Yes Historical Provider   furosemide (LASIX) 40 MG tablet Take 1 tablet (40 mg total) by mouth 2 (two) times a day. 3/14/21  Yes Katlin Modi MD   glipiZIDE (GLUCOTROL) 5 MG tablet Take 5 mg by mouth once daily. 12/21/22  Yes Historical Provider   levothyroxine (SYNTHROID) 100 MCG tablet Take 100 mcg  by mouth once daily.  9/19/17  Yes Historical Provider   LORazepam (ATIVAN) 0.5 MG tablet Take 0.5 mg by mouth every 6 (six) hours as needed for Anxiety.   Yes Historical Provider   metformin (GLUCOPHAGE) 500 MG tablet Take 500 mg by mouth 2 (two) times daily with meals.   Yes Historical Provider   metoprolol succinate (TOPROL-XL) 100 MG 24 hr tablet Take 1 tablet (100 mg total) by mouth once daily. 7/11/22  Yes Vinayak Eric MD   pantoprazole (PROTONIX) 40 MG tablet Take 40 mg by mouth once daily. 1/25/23  Yes Historical Provider   simvastatin (ZOCOR) 40 MG tablet Take 40 mg by mouth every evening.   Yes Historical Provider   sodium,potassium,mag sulfates (SUPREP BOWEL PREP KIT) 17.5-3.13-1.6 gram SolR Take 177 mLs by mouth once daily. 2/1/23   Ginette Dunn NP     Anticoagulants/Antiplatelets: no anticoagulation    Allergies:   Review of patient's allergies indicates:   Allergen Reactions    Codeine Itching     Sedation History:  no adverse reactions    Review of Systems:   Hematological: no known coagulopathies  Respiratory: no shortness of breath  Cardiovascular: no chest pain  Gastrointestinal: no abdominal pain  Genito-Urinary: no dysuria  Musculoskeletal: negative  Neurological: no TIA or stroke symptoms         OBJECTIVE:     Vital Signs (Most Recent)  Temp: 98.4 °F (36.9 °C) (03/17/23 1449)  Pulse: 97 (03/17/23 1506)  Resp: 20 (03/17/23 1506)  BP: 116/88 (03/17/23 1506)  SpO2: 99 % (03/17/23 1506)    Physical Exam:  ASA: n/a  Mallampati: n/a    General: no acute distress  Mental Status: alert and oriented to person, place and time  HEENT: normocephalic, atraumatic  Chest: unlabored breathing  Heart: regular heart rate  Abdomen: distended  Extremity: moves all extremities    Laboratory  Lab Results   Component Value Date    INR 1.2 05/18/2021       Lab Results   Component Value Date    WBC 6.30 03/14/2023    HGB 10.8 (L) 03/14/2023    HCT 33.3 (L) 03/14/2023    MCV 93 03/14/2023     03/14/2023       Lab Results   Component Value Date     (H) 03/14/2023     03/14/2023    K 4.0 03/14/2023     03/14/2023    CO2 28 03/14/2023    BUN 18 03/14/2023    CREATININE 1.65 (H) 03/14/2023    CALCIUM 8.7 03/14/2023    MG 1.5 (L) 03/14/2021    ALT 26 03/14/2023    AST 34 03/14/2023    ALBUMIN 3.3 (L) 03/14/2023    BILITOT 1.4 (H) 03/14/2023       ASSESSMENT/PLAN:     Sedation Plan: Local only.  Patient will undergo paracentesis.    John Graham MD  Diagnostic and Interventional Radiologist  Department of Radiology  Pager: [phone]

## 2023-03-17 NOTE — DISCHARGE SUMMARY
Radiology Discharge Summary      Hospital Course: No complications    Admit Date: 3/17/2023  Discharge Date: 03/17/2023     Instructions Given to Patient: Yes  Diet: Resume prior diet  Activity: activity as tolerated    Description of Condition on Discharge: Stable  Vital Signs (Most Recent): Temp: 98.4 °F (36.9 °C) (03/17/23 1449)  Pulse: 97 (03/17/23 1506)  Resp: 20 (03/17/23 1506)  BP: 116/88 (03/17/23 1506)  SpO2: 99 % (03/17/23 1506)    Discharge Disposition: Home    Discharge Diagnosis: Ascites s/p paracentesis     Follow-up: SHARON Graham MD  Diagnostic and Interventional Radiologist  Department of Radiology  Pager: [phone]

## 2023-03-21 ENCOUNTER — ANESTHESIA (OUTPATIENT)
Dept: ENDOSCOPY | Facility: HOSPITAL | Age: 78
End: 2023-03-21
Payer: MEDICARE

## 2023-03-21 ENCOUNTER — HOSPITAL ENCOUNTER (OUTPATIENT)
Facility: HOSPITAL | Age: 78
Discharge: HOME | End: 2023-03-21
Attending: INTERNAL MEDICINE | Admitting: INTERNAL MEDICINE
Payer: MEDICARE

## 2023-03-21 ENCOUNTER — TELEPHONE (OUTPATIENT)
Dept: GASTROENTEROLOGY | Facility: HOSPITAL | Age: 78
End: 2023-03-21
Payer: MEDICARE

## 2023-03-21 ENCOUNTER — ANESTHESIA EVENT (OUTPATIENT)
Dept: ENDOSCOPY | Facility: HOSPITAL | Age: 78
End: 2023-03-21
Payer: MEDICARE

## 2023-03-21 VITALS
HEIGHT: 68 IN | HEART RATE: 118 BPM | BODY MASS INDEX: 37.13 KG/M2 | SYSTOLIC BLOOD PRESSURE: 123 MMHG | DIASTOLIC BLOOD PRESSURE: 84 MMHG | WEIGHT: 245 LBS | RESPIRATION RATE: 20 BRPM | OXYGEN SATURATION: 100 % | TEMPERATURE: 98 F

## 2023-03-21 DIAGNOSIS — I48.91 A-FIB: ICD-10-CM

## 2023-03-21 DIAGNOSIS — R18.8 OTHER ASCITES: Primary | ICD-10-CM

## 2023-03-21 DIAGNOSIS — Z12.11 SCREEN FOR COLON CANCER: ICD-10-CM

## 2023-03-21 LAB — POCT GLUCOSE: 169 MG/DL (ref 70–110)

## 2023-03-21 PROCEDURE — 43239 EGD BIOPSY SINGLE/MULTIPLE: CPT | Performed by: INTERNAL MEDICINE

## 2023-03-21 PROCEDURE — D9220A PRA ANESTHESIA: ICD-10-PCS | Mod: CRNA,,, | Performed by: NURSE ANESTHETIST, CERTIFIED REGISTERED

## 2023-03-21 PROCEDURE — 63600175 PHARM REV CODE 636 W HCPCS: Performed by: NURSE ANESTHETIST, CERTIFIED REGISTERED

## 2023-03-21 PROCEDURE — 88305 TISSUE EXAM BY PATHOLOGIST: CPT | Performed by: PATHOLOGY

## 2023-03-21 PROCEDURE — 88305 TISSUE EXAM BY PATHOLOGIST: ICD-10-PCS | Mod: 26,,, | Performed by: PATHOLOGY

## 2023-03-21 PROCEDURE — 27201089 HC SNARE, DISP (ANY): Performed by: INTERNAL MEDICINE

## 2023-03-21 PROCEDURE — 93005 ELECTROCARDIOGRAM TRACING: CPT

## 2023-03-21 PROCEDURE — 45385 COLONOSCOPY W/LESION REMOVAL: CPT | Mod: PT,,, | Performed by: INTERNAL MEDICINE

## 2023-03-21 PROCEDURE — 27201012 HC FORCEPS, HOT/COLD, DISP: Performed by: INTERNAL MEDICINE

## 2023-03-21 PROCEDURE — 37000008 HC ANESTHESIA 1ST 15 MINUTES: Performed by: INTERNAL MEDICINE

## 2023-03-21 PROCEDURE — 93010 ELECTROCARDIOGRAM REPORT: CPT | Mod: ,,, | Performed by: INTERNAL MEDICINE

## 2023-03-21 PROCEDURE — D9220A PRA ANESTHESIA: Mod: ANES,,, | Performed by: ANESTHESIOLOGY

## 2023-03-21 PROCEDURE — D9220A PRA ANESTHESIA: Mod: CRNA,,, | Performed by: NURSE ANESTHETIST, CERTIFIED REGISTERED

## 2023-03-21 PROCEDURE — 45385 PR COLONOSCOPY,REMV LESN,SNARE: ICD-10-PCS | Mod: PT,,, | Performed by: INTERNAL MEDICINE

## 2023-03-21 PROCEDURE — 37000009 HC ANESTHESIA EA ADD 15 MINS: Performed by: INTERNAL MEDICINE

## 2023-03-21 PROCEDURE — 25000003 PHARM REV CODE 250: Performed by: NURSE ANESTHETIST, CERTIFIED REGISTERED

## 2023-03-21 PROCEDURE — 45385 COLONOSCOPY W/LESION REMOVAL: CPT | Mod: PT | Performed by: INTERNAL MEDICINE

## 2023-03-21 PROCEDURE — 43239 PR EGD, FLEX, W/BIOPSY, SGL/MULTI: ICD-10-PCS | Mod: 51,,, | Performed by: INTERNAL MEDICINE

## 2023-03-21 PROCEDURE — 88305 TISSUE EXAM BY PATHOLOGIST: CPT | Mod: 26,,, | Performed by: PATHOLOGY

## 2023-03-21 PROCEDURE — 93010 EKG 12-LEAD: ICD-10-PCS | Mod: ,,, | Performed by: INTERNAL MEDICINE

## 2023-03-21 PROCEDURE — D9220A PRA ANESTHESIA: ICD-10-PCS | Mod: ANES,,, | Performed by: ANESTHESIOLOGY

## 2023-03-21 PROCEDURE — 43239 EGD BIOPSY SINGLE/MULTIPLE: CPT | Mod: 51,,, | Performed by: INTERNAL MEDICINE

## 2023-03-21 RX ORDER — PROPOFOL 10 MG/ML
VIAL (ML) INTRAVENOUS CONTINUOUS PRN
Status: DISCONTINUED | OUTPATIENT
Start: 2023-03-21 | End: 2023-03-21

## 2023-03-21 RX ORDER — SODIUM CHLORIDE 9 MG/ML
INJECTION, SOLUTION INTRAVENOUS CONTINUOUS
Status: DISCONTINUED | OUTPATIENT
Start: 2023-03-21 | End: 2023-03-21 | Stop reason: HOSPADM

## 2023-03-21 RX ORDER — SODIUM CHLORIDE 9 MG/ML
INJECTION, SOLUTION INTRAVENOUS CONTINUOUS PRN
Status: DISCONTINUED | OUTPATIENT
Start: 2023-03-21 | End: 2023-03-21

## 2023-03-21 RX ORDER — PROPOFOL 10 MG/ML
VIAL (ML) INTRAVENOUS
Status: DISCONTINUED | OUTPATIENT
Start: 2023-03-21 | End: 2023-03-21

## 2023-03-21 RX ORDER — PHENYLEPHRINE HYDROCHLORIDE 10 MG/ML
INJECTION INTRAVENOUS
Status: DISCONTINUED | OUTPATIENT
Start: 2023-03-21 | End: 2023-03-21

## 2023-03-21 RX ORDER — LIDOCAINE HCL/PF 100 MG/5ML
SYRINGE (ML) INTRAVENOUS
Status: DISCONTINUED | OUTPATIENT
Start: 2023-03-21 | End: 2023-03-21

## 2023-03-21 RX ORDER — SODIUM CHLORIDE 0.9 % (FLUSH) 0.9 %
10 SYRINGE (ML) INJECTION
Status: DISCONTINUED | OUTPATIENT
Start: 2023-03-21 | End: 2023-03-21 | Stop reason: HOSPADM

## 2023-03-21 RX ADMIN — PROPOFOL 100 MCG/KG/MIN: 10 INJECTION, EMULSION INTRAVENOUS at 01:03

## 2023-03-21 RX ADMIN — LIDOCAINE HYDROCHLORIDE 75 MG: 20 INJECTION, SOLUTION INTRAVENOUS at 01:03

## 2023-03-21 RX ADMIN — ESMOLOL HYDROCHLORIDE 20 MG: 100 INJECTION, SOLUTION INTRAVENOUS at 01:03

## 2023-03-21 RX ADMIN — SODIUM CHLORIDE: 0.9 INJECTION, SOLUTION INTRAVENOUS at 12:03

## 2023-03-21 RX ADMIN — PROPOFOL 40 MG: 10 INJECTION, EMULSION INTRAVENOUS at 01:03

## 2023-03-21 RX ADMIN — PHENYLEPHRINE HYDROCHLORIDE 200 MCG: 10 INJECTION INTRAVENOUS at 01:03

## 2023-03-21 RX ADMIN — PROPOFOL 10 MG: 10 INJECTION, EMULSION INTRAVENOUS at 01:03

## 2023-03-21 RX ADMIN — PHENYLEPHRINE HYDROCHLORIDE 250 MCG: 10 INJECTION INTRAVENOUS at 01:03

## 2023-03-21 NOTE — PROVATION PATIENT INSTRUCTIONS
Discharge Summary/Instructions after an Endoscopic Procedure  Patient Name: Philip Kam  Patient MRN: [id number]  Patient YOB: 1945  Tuesday, March 21, 2023  Ronnie Hinton MD  Dear patient,  As a result of recent federal legislation (The Federal Cures Act), you may   receive lab or pathology results from your procedure in your [facility]   account before your physician is able to contact you. Your physician or   their representative will relay the results to you with their   recommendations at their soonest availability.  Thank you,  Your health is very important to us during the Covid Crisis. Following your   procedure today, you will receive a daily text for 2 weeks asking about   signs or symptoms of Covid 19.  Please respond to this text when you   receive it so we can follow up and keep you as safe as possible.   RESTRICTIONS:  During your procedure today, you received medications for sedation.  These   medications may affect your judgment, balance and coordination.  Therefore,   for 24 hours, you have the following restrictions:   - DO NOT drive a car, operate machinery, make legal/financial decisions,   sign important papers or drink alcohol.    ACTIVITY:  Today: no heavy lifting, straining or running due to procedural   sedation/anesthesia.  The following day: return to full activity including work.  DIET:  Eat and drink normally unless instructed otherwise.     TREATMENT FOR COMMON SIDE EFFECTS:  - Mild abdominal pain, nausea, belching, bloating or excessive gas:  rest,   eat lightly and use a heating pad.  - Sore Throat: treat with throat lozenges and/or gargle with warm salt   water.  - Because air was used during the procedure, expelling large amounts of air   from your rectum or belching is normal.  - If a bowel prep was taken, you may not have a bowel movement for 1-3 days.    This is normal.  SYMPTOMS TO WATCH FOR AND REPORT TO YOUR PHYSICIAN:  1. Abdominal pain or bloating, other than  gas cramps.  2. Chest pain.  3. Back pain.  4. Signs of infection such as: chills or fever occurring within 24 hours   after the procedure.  5. Rectal bleeding, which would show as bright red, maroon, or black stools.   (A tablespoon of blood from the rectum is not serious, especially if   hemorrhoids are present.)  6. Vomiting.  7. Weakness or dizziness.  GO DIRECTLY TO THE NEAREST EMERGENCY ROOM IF YOU HAVE ANY OF THE FOLLOWING:      Difficulty breathing              Chills and/or fever over 101 F   Persistent vomiting and/or vomiting blood   Severe abdominal pain   Severe chest pain   Black, tarry stools   Bleeding- more than one tablespoon   Any other symptom or condition that you feel may need urgent attention  Your doctor recommends these additional instructions:  If any biopsies were taken, your doctors clinic will contact you in 1 to 2   weeks with any results.  - Discharge patient to home.   - Patient has a contact number available for emergencies.  The signs and   symptoms of potential delayed complications were discussed with the   patient.  Return to normal activities tomorrow.  Written discharge   instructions were provided to the patient.   - Resume previous diet.   - Continue present medications.   - Await pathology results.   - Repeat upper endoscopy in 6 months for surveillance.   - Perform a colonoscopy today.   - Refer to hepatology at appointment to be scheduled.  For questions, problems or results please call your physician - Ronnie Hinton MD.  EMERGENCY PHONE NUMBER: [phone],  LAB RESULTS: [phone]  IF A COMPLICATION OR EMERGENCY SITUATION ARISES AND YOU ARE UNABLE TO REACH   YOUR PHYSICIAN - GO DIRECTLY TO THE EMERGENCY ROOM.  Ronnie Hinton MD  3/21/2023 1:54:55 PM  This report has been verified and signed electronically.  Dear patient,  As a result of recent federal legislation (The Federal Cures Act), you may   receive lab or pathology results from your procedure in your  [facility]   account before your physician is able to contact you. Your physician or   their representative will relay the results to you with their   recommendations at their soonest availability.  Thank you,  [employer]OVATION

## 2023-03-21 NOTE — PROVATION PATIENT INSTRUCTIONS
Discharge Summary/Instructions after an Endoscopic Procedure  Patient Name: Philip Kam  Patient MRN: [id number]  Patient YOB: 1945  Tuesday, March 21, 2023  Ronnie Hinton MD  Dear patient,  As a result of recent federal legislation (The Federal Cures Act), you may   receive lab or pathology results from your procedure in your [facility]   account before your physician is able to contact you. Your physician or   their representative will relay the results to you with their   recommendations at their soonest availability.  Thank you,  Your health is very important to us during the Covid Crisis. Following your   procedure today, you will receive a daily text for 2 weeks asking about   signs or symptoms of Covid 19.  Please respond to this text when you   receive it so we can follow up and keep you as safe as possible.   RESTRICTIONS:  During your procedure today, you received medications for sedation.  These   medications may affect your judgment, balance and coordination.  Therefore,   for 24 hours, you have the following restrictions:   - DO NOT drive a car, operate machinery, make legal/financial decisions,   sign important papers or drink alcohol.    ACTIVITY:  Today: no heavy lifting, straining or running due to procedural   sedation/anesthesia.  The following day: return to full activity including work.  DIET:  Eat and drink normally unless instructed otherwise.     TREATMENT FOR COMMON SIDE EFFECTS:  - Mild abdominal pain, nausea, belching, bloating or excessive gas:  rest,   eat lightly and use a heating pad.  - Sore Throat: treat with throat lozenges and/or gargle with warm salt   water.  - Because air was used during the procedure, expelling large amounts of air   from your rectum or belching is normal.  - If a bowel prep was taken, you may not have a bowel movement for 1-3 days.    This is normal.  SYMPTOMS TO WATCH FOR AND REPORT TO YOUR PHYSICIAN:  1. Abdominal pain or bloating, other than  gas cramps.  2. Chest pain.  3. Back pain.  4. Signs of infection such as: chills or fever occurring within 24 hours   after the procedure.  5. Rectal bleeding, which would show as bright red, maroon, or black stools.   (A tablespoon of blood from the rectum is not serious, especially if   hemorrhoids are present.)  6. Vomiting.  7. Weakness or dizziness.  GO DIRECTLY TO THE NEAREST EMERGENCY ROOM IF YOU HAVE ANY OF THE FOLLOWING:      Difficulty breathing              Chills and/or fever over 101 F   Persistent vomiting and/or vomiting blood   Severe abdominal pain   Severe chest pain   Black, tarry stools   Bleeding- more than one tablespoon   Any other symptom or condition that you feel may need urgent attention  Your doctor recommends these additional instructions:  If any biopsies were taken, your doctors clinic will contact you in 1 to 2   weeks with any results.  - Discharge patient to home.   - Patient has a contact number available for emergencies.  The signs and   symptoms of potential delayed complications were discussed with the   patient.  Return to normal activities tomorrow.  Written discharge   instructions were provided to the patient.   - Resume previous diet.   - Continue present medications.   - Await pathology results.   - No repeat colonoscopy due to age.   - Resume Eliquis (apixaban) at prior dose in 3 days.  For questions, problems or results please call your physician - Ronnie Hinton MD.  EMERGENCY PHONE NUMBER: [phone],  LAB RESULTS: [phone]  IF A COMPLICATION OR EMERGENCY SITUATION ARISES AND YOU ARE UNABLE TO REACH   YOUR PHYSICIAN - GO DIRECTLY TO THE EMERGENCY ROOM.  Ronnie Hinton MD  3/21/2023 2:03:12 PM  This report has been verified and signed electronically.  Dear patient,  As a result of recent federal legislation (The Federal Cures Act), you may   receive lab or pathology results from your procedure in your [facility]   account before your physician is able to  contact you. Your physician or   their representative will relay the results to you with their   recommendations at their soonest availability.  Thank you,  PROVATION

## 2023-03-21 NOTE — H&P
Short Stay Endoscopy History and Physical    PCP - Dylon Chase MD    Procedure - Colonoscopy  + EGD  ASA - per anesthesia  Mallampati - per anesthesia  History of Anesthesia problems - no  Family history Anesthesia problems - no   Plan of anesthesia - General    HPI:  This is a 77 y.o. male here for evaluation of :   Iron def anemia        ROS:  Constitutional: No fevers, chills, No weight loss  CV: No chest pain  Pulm: No cough, No shortness of breath  GI: see HPI  Derm: No rash    Medical History:  has a past medical history of Anticoagulant long-term use, Anxiety, CHF (congestive heart failure), High cholesterol, Hypertension, Hypothyroidism, and Type 2 diabetes mellitus, uncontrolled.    Surgical History:  has a past surgical history that includes Eye surgery; Colonoscopy; Tonsillectomy; Esophagus surgery (2012); Lumbar epidural injection (01/2018); Back surgery; Treatment of cardiac arrhythmia (N/A, 4/8/2021); and Treatment of cardiac arrhythmia (N/A, 5/25/2021).    Family History: family history includes Heart attack in his son.. Otherwise no colon cancer, inflammatory bowel disease, or GI malignancies.    Social History:  reports that he has never smoked. He has never used smokeless tobacco. He reports current alcohol use of about 1.0 standard drink per week. He reports that he does not use drugs.    Review of patient's allergies indicates:   Allergen Reactions    Codeine Itching       Medications:   Medications Prior to Admission   Medication Sig Dispense Refill Last Dose    apixaban (ELIQUIS) 5 mg Tab Take 1 tablet (5 mg total) by mouth 2 (two) times daily. 60 tablet 11 3/18/2023 at 0900    b complex vitamins tablet Take 1 tablet by mouth once daily.   3/20/2023    ferrous sulfate (FEOSOL) Tab tablet Take 1 tablet by mouth daily with breakfast.   3/20/2023    folic acid (FOLVITE) 400 MCG tablet Take 400 mcg by mouth once daily.   3/20/2023    furosemide (LASIX) 40 MG tablet Take 1 tablet (40 mg  total) by mouth 2 (two) times a day. 60 tablet 0 3/20/2023    glipiZIDE (GLUCOTROL) 5 MG tablet Take 5 mg by mouth once daily.   3/20/2023    levothyroxine (SYNTHROID) 100 MCG tablet Take 100 mcg by mouth once daily.    3/20/2023    metformin (GLUCOPHAGE) 500 MG tablet Take 500 mg by mouth 2 (two) times daily with meals.   Past Week    metoprolol succinate (TOPROL-XL) 100 MG 24 hr tablet Take 1 tablet (100 mg total) by mouth once daily. 90 tablet 3 3/21/2023    pantoprazole (PROTONIX) 40 MG tablet Take 40 mg by mouth once daily.   3/20/2023    simvastatin (ZOCOR) 40 MG tablet Take 40 mg by mouth every evening.   3/20/2023    albuterol (PROVENTIL/VENTOLIN HFA) 90 mcg/actuation inhaler        LORazepam (ATIVAN) 0.5 MG tablet Take 0.5 mg by mouth every 6 (six) hours as needed for Anxiety.       sodium,potassium,mag sulfates (SUPREP BOWEL PREP KIT) 17.5-3.13-1.6 gram SolR Take 177 mLs by mouth once daily. 1 kit 0          Physical Exam:    Vital Signs:   Vitals:    03/21/23 1216   BP: 139/80   Pulse: (!) 134   Resp: 18   Temp: 98.1 °F (36.7 °C)       General Appearance: Well appearing in no acute distress  Eyes:    No scleral icterus  ENT: Neck supple, Lips, mucosa, and tongue normal; teeth and gums normal  Abdomen: Soft, non tender, non distended with positive bowel sounds. No hepatosplenomegaly, ascites, or mass.  Extremities: 2+ pulses, no clubbing, cyanosis or edema  Skin: No rash      Labs:  Lab Results   Component Value Date    WBC 6.30 03/14/2023    HGB 10.8 (L) 03/14/2023    HCT 33.3 (L) 03/14/2023     03/14/2023    CHOL 144 03/22/2022    TRIG 60 03/22/2022    HDL 69 03/22/2022    ALT 26 03/14/2023    AST 34 03/14/2023     03/14/2023    K 4.0 03/14/2023     03/14/2023    CREATININE 1.65 (H) 03/14/2023    BUN 18 03/14/2023    CO2 28 03/14/2023    TSH 1.130 03/22/2022    PSA 1.2 03/22/2022    INR 1.2 05/18/2021    HGBA1C 6.1 (H) 03/22/2022       I have explained the risks and benefits of  endoscopy procedures to the patient including but not limited to bleeding, perforation, infection, and death.  The patient was asked if they understand and allowed to ask any further questions to their satisfaction.    Ronnie Hinton MD

## 2023-03-21 NOTE — TRANSFER OF CARE
"Anesthesia Transfer of Care Note    Patient: Philip Kam .    Procedure(s) Performed: Procedure(s) (LRB):  COLONOSCOPY (N/A)  EGD (ESOPHAGOGASTRODUODENOSCOPY) (N/A)    Patient location: GI    Anesthesia Type: general    Transport from OR: Transported from OR on room air with adequate spontaneous ventilation    Post pain: adequate analgesia    Post assessment: no apparent anesthetic complications and tolerated procedure well    Post vital signs: stable    Level of consciousness: responds to stimulation and sedated    Nausea/Vomiting: no nausea/vomiting    Complications: none    Transfer of care protocol was followed      Last vitals:   Visit Vitals  BP (!) 81/57   Pulse (!) 128   Temp 36.6 °C (97.9 °F)   Resp (!) 25   Ht 5' 8" (1.727 m)   Wt 111.1 kg (245 lb)   SpO2 98%   BMI 37.25 kg/m²     "

## 2023-03-21 NOTE — ANESTHESIA PREPROCEDURE EVALUATION
03/21/2023     Philip Kam Sr. is a 77 y.o., male here for C-Scope    Past Medical History:   Diagnosis Date    Anticoagulant long-term use     Anxiety     CHF (congestive heart failure)     High cholesterol     Hypertension     Hypothyroidism     Type 2 diabetes mellitus, uncontrolled      Past Surgical History:   Procedure Laterality Date    BACK SURGERY      COLONOSCOPY      ESOPHAGUS SURGERY  2012    EYE SURGERY      LUMBAR EPIDURAL INJECTION  01/2018    x2    TONSILLECTOMY      TREATMENT OF CARDIAC ARRHYTHMIA N/A 4/8/2021    Procedure: CARDIOVERSION;  Surgeon: Vinayak Eric MD;  Location: [facility] EP LAB;  Service: Cardiology;  Laterality: N/A;  a fib, dccv/juanito, mac, pr,, sscu    TREATMENT OF CARDIAC ARRHYTHMIA N/A 5/25/2021    Procedure: CARDIOVERSION;  Surgeon: Vinayak Eric MD;  Location: [facility] EP LAB;  Service: Cardiology;  Laterality: N/A;  AF, JUANITO (Cx if compliant), DCCV, MAC, [place], 3Prep       TTE 1/24/23   The left ventricle is normal in size with concentric remodeling and normal systolic function.   There are segmental left ventricular wall motion abnormalities.   Normal right ventricular size with normal right ventricular systolic function.   Severe left atrial enlargement.   Mild mitral regurgitation.   There is mild-to-moderate aortic valve stenosis.   Aortic valve area is 1.14 cm2; peak velocity is 2.46 m/s; mean gradient is 17 mmHg.   The estimated ejection fraction is 55%.   Normal left ventricular diastolic function.    Pre-op Assessment    I have reviewed the Patient Summary Reports.     I have reviewed the Nursing Notes. I have reviewed the NPO Status.      Review of Systems  Anesthesia Hx:  History of prior surgery of interest to airway management or planning:  Denies Personal Hx of Anesthesia complications.   Cardiovascular:   Hypertension Dysrhythmias  atrial fibrillation CHF    Pulmonary:  Pulmonary Normal    Musculoskeletal:   Arthritis     Neurological:   Neuromuscular Disease,    Endocrine:   Diabetes Hypothyroidism        Physical Exam  General: Well nourished    Airway:  Mallampati: II   Mouth Opening: Normal  Neck ROM: Normal ROM        Anesthesia Plan  Type of Anesthesia, risks & benefits discussed:    Anesthesia Type: Gen Natural Airway  Informed Consent: Informed consent signed with the Patient and all parties understand the risks and agree with anesthesia plan.  All questions answered.   ASA Score: 3    Ready For Surgery From Anesthesia Perspective.     .

## 2023-03-22 NOTE — ANESTHESIA POSTPROCEDURE EVALUATION
Anesthesia Post Evaluation    Patient: Philip Kam     Procedure(s) Performed: Procedure(s) (LRB):  COLONOSCOPY (N/A)  EGD (ESOPHAGOGASTRODUODENOSCOPY) (N/A)    Final Anesthesia Type: general      Patient location during evaluation: PACU  Patient participation: Yes- Able to Participate  Level of consciousness: awake and alert  Post-procedure vital signs: reviewed and stable  Pain management: adequate  Airway patency: patent    PONV status at discharge: No PONV  Anesthetic complications: no      Cardiovascular status: blood pressure returned to baseline  Respiratory status: unassisted  Hydration status: euvolemic            Vitals Value Taken Time   /84 03/21/23 1438   Temp 36.6 °C (97.9 °F) 03/21/23 1408   Pulse 118 03/21/23 1438   Resp 20 03/21/23 1438   SpO2 100 % 03/21/23 1438         Event Time   Out of Recovery 14:47:02         Pain/Zuly Score: Zuly Score: 9 (3/21/2023  2:09 PM)

## 2023-03-27 LAB
FINAL PATHOLOGIC DIAGNOSIS: NORMAL
GROSS: NORMAL
Lab: NORMAL

## 2023-03-28 ENCOUNTER — TELEPHONE (OUTPATIENT)
Dept: ADMINISTRATIVE | Facility: OTHER | Age: 78
End: 2023-03-28
Payer: MEDICARE

## 2023-04-26 DIAGNOSIS — R18.8 ASCITIC FLUID: Primary | ICD-10-CM

## 2023-05-02 ENCOUNTER — HOSPITAL ENCOUNTER (OUTPATIENT)
Dept: RADIOLOGY | Facility: HOSPITAL | Age: 78
Discharge: HOME | End: 2023-05-02
Attending: FAMILY MEDICINE
Payer: MEDICARE

## 2023-05-02 DIAGNOSIS — R18.8 ASCITIC FLUID: ICD-10-CM

## 2023-05-02 PROCEDURE — 76700 US EXAM ABDOM COMPLETE: CPT | Mod: 26,,, | Performed by: RADIOLOGY

## 2023-05-02 PROCEDURE — 76700 US EXAM ABDOM COMPLETE: CPT | Mod: TC,PO

## 2023-05-02 PROCEDURE — 76700 US ABDOMEN COMPLETE: ICD-10-PCS | Mod: 26,,, | Performed by: RADIOLOGY

## 2023-05-09 ENCOUNTER — TELEPHONE (OUTPATIENT)
Dept: GASTROENTEROLOGY | Facility: CLINIC | Age: 78
End: 2023-05-09
Payer: MEDICARE

## 2023-05-09 DIAGNOSIS — R18.8 OTHER ASCITES: Primary | ICD-10-CM

## 2023-05-09 NOTE — TELEPHONE ENCOUNTER
----- Message from Denise Murray MA sent at 5/8/2023  3:42 PM CDT -----  Regarding: RE: Paracentesis  Hi Dr. Ward,    Can you please place standard IR order for paracentesis? Patient will be schedule in [place] once order is placed.    Alice Sarmiento  ----- Message -----  From: Parul Jim RN  Sent: 5/8/2023   3:17 PM CDT  To: Dylon Chase MD, Olesya Walters PA-C, #  Subject: FW: Paracentesis                                 Can one of the schedulers please reach out to the patient and assist with getting him scheduled for paracentesis?      Thanks  Parul  ----- Message -----  From: Dylon Chase MD  Sent: 5/8/2023   1:06 PM CDT  To: Parul Jim RN  Subject: RE: Paracentesis                                 The original paracentesis was ordered by Jamaal - he had it done  He needs another one  I don't work for [facility] so dont' know what other doctors odred  He is under my care only for now  He needs the procedure again - regaining ascites despite diuretic therapy  Can we get it done please    Luis  ----- Message -----  From: Parul Jim RN  Sent: 5/8/2023  10:17 AM CDT  To: Dylon Chase MD  Subject: Paracentesis                                     Dr. Chase,     I received a message from one of my staff nurses trying to get assistance for Mr. Kam for scheduled paracentesis. I see that the patient has an outstanding order for a paracentesis from Dr Hinton. Are you trying to schedule a one time procedure or should this be standing orders?     Parul

## 2023-05-10 ENCOUNTER — HOSPITAL ENCOUNTER (OUTPATIENT)
Dept: INTERVENTIONAL RADIOLOGY/VASCULAR | Facility: HOSPITAL | Age: 78
Discharge: HOME | End: 2023-05-10
Attending: INTERNAL MEDICINE
Payer: MEDICARE

## 2023-05-10 VITALS
SYSTOLIC BLOOD PRESSURE: 111 MMHG | OXYGEN SATURATION: 97 % | HEIGHT: 69 IN | BODY MASS INDEX: 34.66 KG/M2 | HEART RATE: 87 BPM | DIASTOLIC BLOOD PRESSURE: 77 MMHG | RESPIRATION RATE: 16 BRPM | WEIGHT: 234 LBS

## 2023-05-10 DIAGNOSIS — R18.8 OTHER ASCITES: ICD-10-CM

## 2023-05-10 PROCEDURE — A7048 VACUUM DRAIN BOTTLE/TUBE KIT: HCPCS

## 2023-05-10 PROCEDURE — 63600175 PHARM REV CODE 636 W HCPCS: Mod: JZ,JG | Performed by: PHYSICIAN ASSISTANT

## 2023-05-10 PROCEDURE — 25000003 PHARM REV CODE 250: Performed by: PHYSICIAN ASSISTANT

## 2023-05-10 PROCEDURE — 27200940 IR PARACENTESIS WITH IMAGING

## 2023-05-10 PROCEDURE — 49083 IR PARACENTESIS WITH IMAGING: ICD-10-PCS | Mod: ,,, | Performed by: RADIOLOGY

## 2023-05-10 PROCEDURE — P9047 ALBUMIN (HUMAN), 25%, 50ML: HCPCS | Mod: JZ,JG | Performed by: PHYSICIAN ASSISTANT

## 2023-05-10 PROCEDURE — 49083 ABD PARACENTESIS W/IMAGING: CPT | Performed by: RADIOLOGY

## 2023-05-10 PROCEDURE — A4215 STERILE NEEDLE: HCPCS

## 2023-05-10 RX ORDER — LIDOCAINE HYDROCHLORIDE 10 MG/ML
INJECTION INFILTRATION; PERINEURAL
Status: COMPLETED | OUTPATIENT
Start: 2023-05-10 | End: 2023-05-10

## 2023-05-10 RX ORDER — ALBUMIN HUMAN 250 G/1000ML
SOLUTION INTRAVENOUS
Status: COMPLETED | OUTPATIENT
Start: 2023-05-10 | End: 2023-05-10

## 2023-05-10 RX ADMIN — ALBUMIN (HUMAN) 12.5 G: 25 SOLUTION INTRAVENOUS at 10:05

## 2023-05-10 RX ADMIN — LIDOCAINE HYDROCHLORIDE 5 ML: 10 INJECTION, SOLUTION INFILTRATION; PERINEURAL at 09:05

## 2023-05-10 NOTE — NURSING
Patient is discharged from IR. IV is removed. Site is dressed, and patient is advised to leave dressing in place for at least 20 to 30 minutes. AVS is printed and reviewed. Upcoming appointments and the [facility] number is highlighted for convenience. The opportunity to ask questions is provided. Patient is wheeled to the ramp with his wife to await vehicle from [employer] [facility].

## 2023-05-10 NOTE — H&P
Radiology History & Physical      SUBJECTIVE:     Chief Complaint: abdominal distention    History of Present Illness:  Philip Kam Sr. is a 77 y.o. male who presents for ultrasound guided paracentesis  Past Medical History:   Diagnosis Date    Anticoagulant long-term use     Anxiety     CHF (congestive heart failure)     High cholesterol     Hypertension     Hypothyroidism     Type 2 diabetes mellitus, uncontrolled      Past Surgical History:   Procedure Laterality Date    BACK SURGERY      COLONOSCOPY      COLONOSCOPY N/A 3/21/2023    Procedure: COLONOSCOPY;  Surgeon: Ronnie Hinton MD;  Location: [facility] ENDO;  Service: Endoscopy;  Laterality: N/A;    ESOPHAGOGASTRODUODENOSCOPY N/A 3/21/2023    Procedure: EGD (ESOPHAGOGASTRODUODENOSCOPY);  Surgeon: Ronnie Hinton MD;  Location: [facility] ENDO;  Service: Endoscopy;  Laterality: N/A;    ESOPHAGUS SURGERY  2012    EYE SURGERY      LUMBAR EPIDURAL INJECTION  01/2018    x2    TONSILLECTOMY      TREATMENT OF CARDIAC ARRHYTHMIA N/A 4/8/2021    Procedure: CARDIOVERSION;  Surgeon: Vinayak Eric MD;  Location: [facility] EP LAB;  Service: Cardiology;  Laterality: N/A;  a fib, dccv/hilario, mac, pr,, sscu    TREATMENT OF CARDIAC ARRHYTHMIA N/A 5/25/2021    Procedure: CARDIOVERSION;  Surgeon: Vinayak Eric MD;  Location: [facility] EP LAB;  Service: Cardiology;  Laterality: N/A;  AF, HILARIO (Cx if compliant), DCCV, MAC, [place], 3Prep       Home Meds:   Prior to Admission medications    Medication Sig Start Date End Date Taking? Authorizing Provider   albuterol (PROVENTIL/VENTOLIN HFA) 90 mcg/actuation inhaler  1/16/21  Yes Historical Provider   apixaban (ELIQUIS) 5 mg Tab Take 1 tablet (5 mg total) by mouth 2 (two) times daily. 5/19/22  Yes Vinayak Eric MD   b complex vitamins tablet Take 1 tablet by mouth once daily.   Yes Historical Provider   ferrous sulfate (FEOSOL) Tab tablet Take 1 tablet by mouth daily with breakfast.   Yes Historical Provider   folic acid (FOLVITE) 400 MCG tablet  Take 400 mcg by mouth once daily.   Yes Historical Provider   furosemide (LASIX) 40 MG tablet Take 1 tablet (40 mg total) by mouth 2 (two) times a day. 3/14/21  Yes Katlin Modi MD   glipiZIDE (GLUCOTROL) 5 MG tablet Take 5 mg by mouth once daily. 12/21/22  Yes Historical Provider   levothyroxine (SYNTHROID) 100 MCG tablet Take 100 mcg by mouth once daily.  9/19/17  Yes Historical Provider   LORazepam (ATIVAN) 0.5 MG tablet Take 0.5 mg by mouth every 6 (six) hours as needed for Anxiety.   Yes Historical Provider   metformin (GLUCOPHAGE) 500 MG tablet Take 500 mg by mouth 2 (two) times daily with meals.   Yes Historical Provider   metoprolol succinate (TOPROL-XL) 100 MG 24 hr tablet Take 1 tablet (100 mg total) by mouth once daily. 7/11/22  Yes Vinayak Eric MD   pantoprazole (PROTONIX) 40 MG tablet Take 40 mg by mouth once daily. 1/25/23  Yes Historical Provider   simvastatin (ZOCOR) 40 MG tablet Take 40 mg by mouth every evening.   Yes Historical Provider   sodium,potassium,mag sulfates (SUPREP BOWEL PREP KIT) 17.5-3.13-1.6 gram SolR Take 177 mLs by mouth once daily. 2/1/23   Ginette Dunn NP     Anticoagulants/Antiplatelets: no anticoagulation    Allergies:   Review of patient's allergies indicates:   Allergen Reactions    Codeine Itching     Sedation History:  no adverse reactions    Review of Systems:   Hematological: no known coagulopathies  Respiratory: no shortness of breath  Cardiovascular: no chest pain  Gastrointestinal: no abdominal pain  Genito-Urinary: no dysuria  Musculoskeletal: negative  Neurological: no TIA or stroke symptoms         OBJECTIVE:     Vital Signs (Most Recent)  Pulse: 98 (05/10/23 0932)  Resp: 16 (05/10/23 0932)  BP: 121/77 (05/10/23 0932)  SpO2: 97 % (05/10/23 0932)    Physical Exam:  ASA: 2  Mallampati: n/a    General: no acute distress  Mental Status: alert and oriented to person, place and time  HEENT: normocephalic, atraumatic  Chest: unlabored breathing  Heart: regular  heart rate  Abdomen: distended  Extremity: moves all extremities    ASSESSMENT/PLAN:     Sedation Plan: local  Patient will undergo ultrasound guided paracentesis.    Olesya Walters PA-C

## 2023-05-10 NOTE — NURSING
Called and spoke to spouse, provided number for future cases, [phone], spouse thankful for information

## 2023-05-10 NOTE — DISCHARGE SUMMARY
Interventional Radiology Short Stay Discharge Summary      Admit Date: 5/10/2023  Discharge Date: 05/10/2023     Hospital Course: Uneventful    Discharge Diagnosis: ascites    Discharge Condition: Stable    Discharge Disposition: Home    Diet: Resume prior diet    Activity: activity as tolerated    Follow-up: With referring provider    Cindy Chava, PA-C [facility]

## 2023-05-25 DIAGNOSIS — R18.8 ASCITIC FLUID: Primary | ICD-10-CM

## 2023-06-05 ENCOUNTER — HOSPITAL ENCOUNTER (OUTPATIENT)
Dept: RADIOLOGY | Facility: HOSPITAL | Age: 78
Discharge: HOME | End: 2023-06-05
Attending: FAMILY MEDICINE
Payer: MEDICARE

## 2023-06-05 DIAGNOSIS — R18.8 ASCITIC FLUID: ICD-10-CM

## 2023-06-05 PROCEDURE — 76700 US EXAM ABDOM COMPLETE: CPT | Mod: TC,PO

## 2023-06-05 PROCEDURE — 76700 US ABDOMEN COMPLETE: ICD-10-PCS | Mod: 26,,, | Performed by: RADIOLOGY

## 2023-06-05 PROCEDURE — 76700 US EXAM ABDOM COMPLETE: CPT | Mod: 26,,, | Performed by: RADIOLOGY

## 2023-06-14 ENCOUNTER — TELEPHONE (OUTPATIENT)
Dept: INTERVENTIONAL RADIOLOGY/VASCULAR | Facility: HOSPITAL | Age: 78
End: 2023-06-14
Payer: MEDICARE

## 2023-06-14 NOTE — NURSING
Patient advised to arrive at 1:00, where to check in, no need to fast, may drive self, take medications as usual, bring current list of home medications. One allergy confirmed; Eliquis in medication profile.

## 2023-06-15 ENCOUNTER — HOSPITAL ENCOUNTER (OUTPATIENT)
Dept: INTERVENTIONAL RADIOLOGY/VASCULAR | Facility: HOSPITAL | Age: 78
Discharge: HOME | End: 2023-06-15
Attending: INTERNAL MEDICINE
Payer: MEDICARE

## 2023-06-15 VITALS
BODY MASS INDEX: 34.1 KG/M2 | WEIGHT: 225 LBS | HEIGHT: 68 IN | DIASTOLIC BLOOD PRESSURE: 72 MMHG | HEART RATE: 85 BPM | OXYGEN SATURATION: 99 % | RESPIRATION RATE: 23 BRPM | SYSTOLIC BLOOD PRESSURE: 113 MMHG | TEMPERATURE: 98 F

## 2023-06-15 DIAGNOSIS — R18.8 ASCITES: ICD-10-CM

## 2023-06-15 DIAGNOSIS — R18.8 OTHER ASCITES: ICD-10-CM

## 2023-06-15 PROCEDURE — 25000003 PHARM REV CODE 250: Performed by: PHYSICIAN ASSISTANT

## 2023-06-15 PROCEDURE — A4215 STERILE NEEDLE: HCPCS

## 2023-06-15 PROCEDURE — A7048 VACUUM DRAIN BOTTLE/TUBE KIT: HCPCS

## 2023-06-15 PROCEDURE — 49083 IR PARACENTESIS WITH IMAGING: ICD-10-PCS | Mod: ,,, | Performed by: RADIOLOGY

## 2023-06-15 PROCEDURE — 49083 ABD PARACENTESIS W/IMAGING: CPT | Performed by: RADIOLOGY

## 2023-06-15 RX ORDER — LIDOCAINE HYDROCHLORIDE 10 MG/ML
INJECTION INFILTRATION; PERINEURAL
Status: COMPLETED | OUTPATIENT
Start: 2023-06-15 | End: 2023-06-15

## 2023-06-15 RX ADMIN — LIDOCAINE HYDROCHLORIDE 5 ML: 10 INJECTION, SOLUTION INFILTRATION; PERINEURAL at 01:06

## 2023-06-15 NOTE — DISCHARGE SUMMARY
Interventional Radiology Short Stay Discharge Summary      Admit Date: 6/15/2023  Discharge Date: 06/15/2023     Hospital Course: Uneventful    Discharge Diagnosis: ascites    Discharge Condition: Stable    Discharge Disposition: Home    Diet: Resume prior diet    Activity: activity as tolerated    Follow-up: With referring provider    Cindy Chava, PA-C [facility]

## 2023-06-15 NOTE — PROCEDURES
Radiology Post-Procedure Note    Pre Op Diagnosis: Ascites  Post Op Diagnosis: Same    Procedure: Ultrasound Guided Paracentesis    Procedure performed by: Olesya Walters PA-C    Written Informed Consent Obtained: Yes  Specimen Removed: No specimens sent  Estimated Blood Loss: Minimal    Findings:   Successful paracentesis.  1700 ml clear yellow fluid removed.  Albumin administered PRN per protocol.    Patient tolerated procedure well.    Olesya Walters PA-C

## 2023-06-15 NOTE — NURSING
Patient's IR care is complete. VSS. See flowsheets. AVS reviewed with patient. Patient declines AVS paperwork. All questions answered. Dressing c/d/I, no hematoma. Patient accompanied to spouse's vehicle via wheelchair at discharge.

## 2023-06-15 NOTE — H&P
Radiology History & Physical      SUBJECTIVE:     Chief Complaint: abdominal distention    History of Present Illness:  Philip Kam Sr. is a 77 y.o. male who presents for ultrasound guided paracentesis  Past Medical History:   Diagnosis Date    Anticoagulant long-term use     Anxiety     CHF (congestive heart failure)     High cholesterol     Hypertension     Hypothyroidism     Type 2 diabetes mellitus, uncontrolled      Past Surgical History:   Procedure Laterality Date    BACK SURGERY      COLONOSCOPY      COLONOSCOPY N/A 3/21/2023    Procedure: COLONOSCOPY;  Surgeon: Ronnie Hinton MD;  Location: [facility] ENDO;  Service: Endoscopy;  Laterality: N/A;    ESOPHAGOGASTRODUODENOSCOPY N/A 3/21/2023    Procedure: EGD (ESOPHAGOGASTRODUODENOSCOPY);  Surgeon: Ronnie Hinton MD;  Location: [facility] ENDO;  Service: Endoscopy;  Laterality: N/A;    ESOPHAGUS SURGERY  2012    EYE SURGERY      LUMBAR EPIDURAL INJECTION  01/2018    x2    TONSILLECTOMY      TREATMENT OF CARDIAC ARRHYTHMIA N/A 4/8/2021    Procedure: CARDIOVERSION;  Surgeon: Vinayak Eric MD;  Location: [facility] EP LAB;  Service: Cardiology;  Laterality: N/A;  a fib, dccv/hilario, mac, pr,, sscu    TREATMENT OF CARDIAC ARRHYTHMIA N/A 5/25/2021    Procedure: CARDIOVERSION;  Surgeon: Vinayak Eric MD;  Location: [facility] EP LAB;  Service: Cardiology;  Laterality: N/A;  AF, HILARIO (Cx if compliant), DCCV, MAC, NM, 3Prep       Home Meds:   Prior to Admission medications    Medication Sig Start Date End Date Taking? Authorizing Provider   albuterol (PROVENTIL/VENTOLIN HFA) 90 mcg/actuation inhaler  1/16/21   Historical Provider   apixaban (ELIQUIS) 5 mg Tab Take 1 tablet (5 mg total) by mouth 2 (two) times daily. 5/19/22   Vinayak Eric MD   b complex vitamins tablet Take 1 tablet by mouth once daily.    Historical Provider   ferrous sulfate (FEOSOL) Tab tablet Take 1 tablet by mouth daily with breakfast.    Historical Provider   folic acid (FOLVITE) 400 MCG tablet Take 400 mcg  by mouth once daily.    Historical Provider   furosemide (LASIX) 40 MG tablet Take 1 tablet (40 mg total) by mouth 2 (two) times a day. 3/14/21   Katlin Modi MD   glipiZIDE (GLUCOTROL) 5 MG tablet Take 5 mg by mouth once daily. 12/21/22   Historical Provider   levothyroxine (SYNTHROID) 100 MCG tablet Take 100 mcg by mouth once daily.  9/19/17   Historical Provider   LORazepam (ATIVAN) 0.5 MG tablet Take 0.5 mg by mouth every 6 (six) hours as needed for Anxiety.    Historical Provider   metformin (GLUCOPHAGE) 500 MG tablet Take 500 mg by mouth 2 (two) times daily with meals.    Historical Provider   metoprolol succinate (TOPROL-XL) 100 MG 24 hr tablet Take 1 tablet (100 mg total) by mouth once daily. 7/11/22   Vinayak Eric MD   pantoprazole (PROTONIX) 40 MG tablet Take 40 mg by mouth once daily. 1/25/23   Historical Provider   simvastatin (ZOCOR) 40 MG tablet Take 40 mg by mouth every evening.    Historical Provider   sodium,potassium,mag sulfates (SUPREP BOWEL PREP KIT) 17.5-3.13-1.6 gram SolR Take 177 mLs by mouth once daily. 2/1/23   Ginette Dunn NP     Anticoagulants/Antiplatelets: no anticoagulation    Allergies:   Review of patient's allergies indicates:   Allergen Reactions    Codeine Itching     Sedation History:  no adverse reactions    Review of Systems:   Hematological: no known coagulopathies  Respiratory: no shortness of breath  Cardiovascular: no chest pain  Gastrointestinal: no abdominal pain  Genito-Urinary: no dysuria  Musculoskeletal: negative  Neurological: no TIA or stroke symptoms         OBJECTIVE:     Vital Signs (Most Recent)       Physical Exam:  ASA: 2  Mallampati: n/a    General: no acute distress  Mental Status: alert and oriented to person, place and time  HEENT: normocephalic, atraumatic  Chest: unlabored breathing  Heart: regular heart rate  Abdomen: distended  Extremity: moves all extremities    ASSESSMENT/PLAN:     Sedation Plan: local  Patient will undergo ultrasound guided  paracentesis.    Olesya Walters PA-C

## 2023-06-16 DIAGNOSIS — I48.19 PERSISTENT ATRIAL FIBRILLATION: Primary | ICD-10-CM

## 2023-06-19 RX ORDER — APIXABAN 5 MG/1
TABLET, FILM COATED ORAL
Qty: 60 TABLET | Refills: 10 | Status: SHIPPED | OUTPATIENT
Start: 2023-06-19 | End: 2023-10-10 | Stop reason: ALTCHOICE

## 2023-07-07 ENCOUNTER — OFFICE VISIT (OUTPATIENT)
Dept: HEPATOLOGY | Facility: CLINIC | Age: 78
End: 2023-07-07
Payer: MEDICARE

## 2023-07-07 ENCOUNTER — LAB VISIT (OUTPATIENT)
Dept: LAB | Facility: HOSPITAL | Age: 78
End: 2023-07-07
Attending: INTERNAL MEDICINE
Payer: MEDICARE

## 2023-07-07 VITALS
HEIGHT: 68 IN | RESPIRATION RATE: 18 BRPM | BODY MASS INDEX: 33.25 KG/M2 | SYSTOLIC BLOOD PRESSURE: 115 MMHG | OXYGEN SATURATION: 99 % | DIASTOLIC BLOOD PRESSURE: 79 MMHG | HEART RATE: 82 BPM | WEIGHT: 219.38 LBS | TEMPERATURE: 97 F

## 2023-07-07 DIAGNOSIS — R18.8 OTHER ASCITES: ICD-10-CM

## 2023-07-07 DIAGNOSIS — E08.65 DIABETES MELLITUS DUE TO UNDERLYING CONDITION WITH HYPERGLYCEMIA, WITHOUT LONG-TERM CURRENT USE OF INSULIN: ICD-10-CM

## 2023-07-07 DIAGNOSIS — E11.65 TYPE 2 DIABETES MELLITUS WITH HYPERGLYCEMIA, WITHOUT LONG-TERM CURRENT USE OF INSULIN: Primary | ICD-10-CM

## 2023-07-07 DIAGNOSIS — K70.31 ALCOHOLIC CIRRHOSIS OF LIVER WITH ASCITES: ICD-10-CM

## 2023-07-07 LAB
AFP SERPL-MCNC: 2.5 NG/ML (ref 0–8.4)
ALBUMIN SERPL BCP-MCNC: 2.9 G/DL (ref 3.5–5.2)
ALP SERPL-CCNC: 88 U/L (ref 55–135)
ALT SERPL W/O P-5'-P-CCNC: 20 U/L (ref 10–44)
ANION GAP SERPL CALC-SCNC: 10 MMOL/L (ref 8–16)
AST SERPL-CCNC: 28 U/L (ref 10–40)
BASOPHILS # BLD AUTO: 0.06 K/UL (ref 0–0.2)
BASOPHILS NFR BLD: 0.9 % (ref 0–1.9)
BILIRUB SERPL-MCNC: 2.1 MG/DL (ref 0.1–1)
BUN SERPL-MCNC: 24 MG/DL (ref 8–23)
CALCIUM SERPL-MCNC: 9.4 MG/DL (ref 8.7–10.5)
CHLORIDE SERPL-SCNC: 98 MMOL/L (ref 95–110)
CO2 SERPL-SCNC: 26 MMOL/L (ref 23–29)
CREAT SERPL-MCNC: 1.9 MG/DL (ref 0.5–1.4)
DIFFERENTIAL METHOD: ABNORMAL
EOSINOPHIL # BLD AUTO: 0.2 K/UL (ref 0–0.5)
EOSINOPHIL NFR BLD: 2.3 % (ref 0–8)
ERYTHROCYTE [DISTWIDTH] IN BLOOD BY AUTOMATED COUNT: 12.9 % (ref 11.5–14.5)
EST. GFR  (NO RACE VARIABLE): 35.9 ML/MIN/1.73 M^2
FERRITIN SERPL-MCNC: 103 NG/ML (ref 20–300)
GLUCOSE SERPL-MCNC: 307 MG/DL (ref 70–110)
HAV IGG SER QL IA: REACTIVE
HBV SURFACE AB SER-ACNC: <3 MIU/ML
HBV SURFACE AB SER-ACNC: NORMAL M[IU]/ML
HBV SURFACE AG SERPL QL IA: NORMAL
HCT VFR BLD AUTO: 37.5 % (ref 40–54)
HCV AB SERPL QL IA: NORMAL
HGB BLD-MCNC: 12.5 G/DL (ref 14–18)
IGA SERPL-MCNC: 946 MG/DL (ref 40–350)
IGG SERPL-MCNC: 3205 MG/DL (ref 650–1600)
IGM SERPL-MCNC: 380 MG/DL (ref 50–300)
IMM GRANULOCYTES # BLD AUTO: 0.04 K/UL (ref 0–0.04)
IMM GRANULOCYTES NFR BLD AUTO: 0.6 % (ref 0–0.5)
INR PPP: 1.2 (ref 0.8–1.2)
LYMPHOCYTES # BLD AUTO: 1.5 K/UL (ref 1–4.8)
LYMPHOCYTES NFR BLD: 21.1 % (ref 18–48)
MCH RBC QN AUTO: 32.4 PG (ref 27–31)
MCHC RBC AUTO-ENTMCNC: 33.3 G/DL (ref 32–36)
MCV RBC AUTO: 97 FL (ref 82–98)
MONOCYTES # BLD AUTO: 0.5 K/UL (ref 0.3–1)
MONOCYTES NFR BLD: 7.1 % (ref 4–15)
NEUTROPHILS # BLD AUTO: 4.8 K/UL (ref 1.8–7.7)
NEUTROPHILS NFR BLD: 68 % (ref 38–73)
NRBC BLD-RTO: 0 /100 WBC
PLATELET # BLD AUTO: 220 K/UL (ref 150–450)
PMV BLD AUTO: 10.8 FL (ref 9.2–12.9)
POTASSIUM SERPL-SCNC: 4.6 MMOL/L (ref 3.5–5.1)
PROT SERPL-MCNC: 8.8 G/DL (ref 6–8.4)
PROTHROMBIN TIME: 12.8 SEC (ref 9–12.5)
RBC # BLD AUTO: 3.86 M/UL (ref 4.6–6.2)
SODIUM SERPL-SCNC: 134 MMOL/L (ref 136–145)
WBC # BLD AUTO: 7.01 K/UL (ref 3.9–12.7)

## 2023-07-07 PROCEDURE — 85025 COMPLETE CBC W/AUTO DIFF WBC: CPT | Performed by: INTERNAL MEDICINE

## 2023-07-07 PROCEDURE — 3288F PR FALLS RISK ASSESSMENT DOCUMENTED: ICD-10-PCS | Mod: CPTII,S$GLB,, | Performed by: INTERNAL MEDICINE

## 2023-07-07 PROCEDURE — 1160F PR REVIEW ALL MEDS BY PRESCRIBER/CLIN PHARMACIST DOCUMENTED: ICD-10-PCS | Mod: CPTII,S$GLB,, | Performed by: INTERNAL MEDICINE

## 2023-07-07 PROCEDURE — 1101F PR PT FALLS ASSESS DOC 0-1 FALLS W/OUT INJ PAST YR: ICD-10-PCS | Mod: CPTII,S$GLB,, | Performed by: INTERNAL MEDICINE

## 2023-07-07 PROCEDURE — 3288F FALL RISK ASSESSMENT DOCD: CPT | Mod: CPTII,S$GLB,, | Performed by: INTERNAL MEDICINE

## 2023-07-07 PROCEDURE — 1160F RVW MEDS BY RX/DR IN RCRD: CPT | Mod: CPTII,S$GLB,, | Performed by: INTERNAL MEDICINE

## 2023-07-07 PROCEDURE — 87340 HEPATITIS B SURFACE AG IA: CPT | Performed by: INTERNAL MEDICINE

## 2023-07-07 PROCEDURE — 85610 PROTHROMBIN TIME: CPT | Performed by: INTERNAL MEDICINE

## 2023-07-07 PROCEDURE — 99205 OFFICE O/P NEW HI 60 MIN: CPT | Mod: S$GLB,,, | Performed by: INTERNAL MEDICINE

## 2023-07-07 PROCEDURE — 99205 PR OFFICE/OUTPT VISIT, NEW, LEVL V, 60-74 MIN: ICD-10-PCS | Mod: S$GLB,,, | Performed by: INTERNAL MEDICINE

## 2023-07-07 PROCEDURE — 82542 COL CHROMOTOGRAPHY QUAL/QUAN: CPT | Performed by: INTERNAL MEDICINE

## 2023-07-07 PROCEDURE — 86381 MITOCHONDRIAL ANTIBODY EACH: CPT | Performed by: INTERNAL MEDICINE

## 2023-07-07 PROCEDURE — 3078F DIAST BP <80 MM HG: CPT | Mod: CPTII,S$GLB,, | Performed by: INTERNAL MEDICINE

## 2023-07-07 PROCEDURE — 3074F PR MOST RECENT SYSTOLIC BLOOD PRESSURE < 130 MM HG: ICD-10-PCS | Mod: CPTII,S$GLB,, | Performed by: INTERNAL MEDICINE

## 2023-07-07 PROCEDURE — 1126F PR PAIN SEVERITY QUANTIFIED, NO PAIN PRESENT: ICD-10-PCS | Mod: CPTII,S$GLB,, | Performed by: INTERNAL MEDICINE

## 2023-07-07 PROCEDURE — 1126F AMNT PAIN NOTED NONE PRSNT: CPT | Mod: CPTII,S$GLB,, | Performed by: INTERNAL MEDICINE

## 2023-07-07 PROCEDURE — 1159F MED LIST DOCD IN RCRD: CPT | Mod: CPTII,S$GLB,, | Performed by: INTERNAL MEDICINE

## 2023-07-07 PROCEDURE — 82784 ASSAY IGA/IGD/IGG/IGM EACH: CPT | Mod: 59 | Performed by: INTERNAL MEDICINE

## 2023-07-07 PROCEDURE — 80053 COMPREHEN METABOLIC PANEL: CPT | Performed by: INTERNAL MEDICINE

## 2023-07-07 PROCEDURE — 1159F PR MEDICATION LIST DOCUMENTED IN MEDICAL RECORD: ICD-10-PCS | Mod: CPTII,S$GLB,, | Performed by: INTERNAL MEDICINE

## 2023-07-07 PROCEDURE — 82728 ASSAY OF FERRITIN: CPT | Performed by: INTERNAL MEDICINE

## 2023-07-07 PROCEDURE — 99999 PR PBB SHADOW E&M-EST. PATIENT-LVL IV: ICD-10-PCS | Mod: PBBFAC,,, | Performed by: INTERNAL MEDICINE

## 2023-07-07 PROCEDURE — 86790 VIRUS ANTIBODY NOS: CPT | Performed by: INTERNAL MEDICINE

## 2023-07-07 PROCEDURE — 99999 PR PBB SHADOW E&M-EST. PATIENT-LVL IV: CPT | Mod: PBBFAC,,, | Performed by: INTERNAL MEDICINE

## 2023-07-07 PROCEDURE — 86706 HEP B SURFACE ANTIBODY: CPT | Performed by: INTERNAL MEDICINE

## 2023-07-07 PROCEDURE — 3074F SYST BP LT 130 MM HG: CPT | Mod: CPTII,S$GLB,, | Performed by: INTERNAL MEDICINE

## 2023-07-07 PROCEDURE — 86038 ANTINUCLEAR ANTIBODIES: CPT | Performed by: INTERNAL MEDICINE

## 2023-07-07 PROCEDURE — 1101F PT FALLS ASSESS-DOCD LE1/YR: CPT | Mod: CPTII,S$GLB,, | Performed by: INTERNAL MEDICINE

## 2023-07-07 PROCEDURE — 3078F PR MOST RECENT DIASTOLIC BLOOD PRESSURE < 80 MM HG: ICD-10-PCS | Mod: CPTII,S$GLB,, | Performed by: INTERNAL MEDICINE

## 2023-07-07 PROCEDURE — 86803 HEPATITIS C AB TEST: CPT | Performed by: INTERNAL MEDICINE

## 2023-07-07 PROCEDURE — 82105 ALPHA-FETOPROTEIN SERUM: CPT | Performed by: INTERNAL MEDICINE

## 2023-07-07 PROCEDURE — 86015 ACTIN ANTIBODY EACH: CPT | Performed by: INTERNAL MEDICINE

## 2023-07-07 RX ORDER — SPIRONOLACTONE 100 MG/1
100 TABLET, FILM COATED ORAL
COMMUNITY
Start: 2023-06-12

## 2023-07-07 NOTE — PROGRESS NOTES
Subjective:       Patient ID: Philip Kam Sr. is a 77 y.o. male.    Chief Complaint: Cirrhosis and Ascites    HPI  I saw this 77 y.o. with decompensated alcohol related cirrhosis since April 2023. He came to the liver clinic with his wife.    Used to drink 3-4 large whiskies per day  Stopped 3 months ago with the diagnosis of liver disease    Ascites/edema/trouble breathing  Managed by PCP with labs and paracentesis  - drained x3 - every 6 weeks    On low dose diuretics  Weak and tired but no HE    Abdo US: 5/25/23  1. There is a moderate amount of ascites.  2. The liver has a nodular surface.  This is characteristic of hepatic cirrhosis.  3. The wall of the gallbladder appears to be thickened.  It measures 7 mm in thickness.  This may be secondary to the ascites.  There is a 23 mm stone in the gallbladder. There is no sonographic Ceelstin sign or common bile duct dilatation.  4. There is mild renal cortical thinning of both kidneys.    Abdo CT: 3/14/23  1. Cirrhotic diminutive liver with nodular surface contour and moderate to large volume ascites. Mild body wall edema. Small left pleural effusion. Patchy bibasilar ground-glass opacities, pulmonary edema versus small airway process with air trapping/mosaic perfusion.  Mild splenomegaly, 14 cm.  2. No bowel obstruction.  No focal intra-abdominal inflammatory process.  3. Bilateral symmetric renal atrophy/cortical thinning.  No hydronephrosis.  4. Contracted gallbladder with hyperdense lumen, question inspissated sludge.  No discrete gallstone.  No evidence of gallbladder inflammation    EGD: 3/21/23  - Small to medium (< 5 mm) esophageal varices.                          Mainly near GEJ, flatten with insufflation                          - Portal hypertensive gastropathy.                          - A single duodenal polyp. Resected and retrieved    2 D echo: 1/24/23  The left ventricle is normal in size with concentric remodeling and normal systolic  function.  There are segmental left ventricular wall motion abnormalities.  Normal right ventricular size with normal right ventricular systolic function.  Severe left atrial enlargement.  Mild mitral regurgitation.  There is mild-to-moderate aortic valve stenosis.  Aortic valve area is 1.14 cm2; peak velocity is 2.46 m/s; mean gradient is 17 mmHg.  The estimated ejection fraction is 55%.  Normal left ventricular diastolic function.    PMH:  CHF/ A fib  Hypertension  DM  Hypothyroidism      Review of Systems   Constitutional:  Negative for activity change, appetite change, chills, fatigue, fever and unexpected weight change.   HENT:  Negative for hearing loss.    Eyes:  Negative for discharge and visual disturbance.   Respiratory:  Negative for cough, chest tightness, shortness of breath and wheezing.    Cardiovascular:  Negative for chest pain, palpitations and leg swelling.   Gastrointestinal:  Negative for abdominal distention, abdominal pain, constipation, diarrhea and nausea.   Genitourinary:  Negative for dysuria and frequency.   Musculoskeletal:  Negative for arthralgias and back pain.   Skin:  Negative for pallor and rash.   Neurological:  Negative for dizziness, tremors, speech difficulty and headaches.   Hematological:  Negative for adenopathy.   Psychiatric/Behavioral:  Negative for agitation and confusion.        Lab Results   Component Value Date    ALT 26 03/14/2023    AST 34 03/14/2023    ALKPHOS 83 03/14/2023    BILITOT 1.4 (H) 03/14/2023     Past Medical History:   Diagnosis Date    Alcoholic cirrhosis of liver with ascites 7/7/2023    Anticoagulant long-term use     Anxiety     CHF (congestive heart failure)     High cholesterol     Hypertension     Hypothyroidism     Type 2 diabetes mellitus, uncontrolled      Past Surgical History:   Procedure Laterality Date    BACK SURGERY      COLONOSCOPY      COLONOSCOPY N/A 3/21/2023    Procedure: COLONOSCOPY;  Surgeon: Ronnie Hinton MD;  Location: [facility]  ENDO;  Service: Endoscopy;  Laterality: N/A;    ESOPHAGOGASTRODUODENOSCOPY N/A 3/21/2023    Procedure: EGD (ESOPHAGOGASTRODUODENOSCOPY);  Surgeon: Ronnie Hinton MD;  Location: [facility] ENDO;  Service: Endoscopy;  Laterality: N/A;    ESOPHAGUS SURGERY  2012    EYE SURGERY      LUMBAR EPIDURAL INJECTION  01/2018    x2    TONSILLECTOMY      TREATMENT OF CARDIAC ARRHYTHMIA N/A 4/8/2021    Procedure: CARDIOVERSION;  Surgeon: Vinayak Eric MD;  Location: [facility] EP LAB;  Service: Cardiology;  Laterality: N/A;  a fib, dccv/hilario, mac, pr,, sscu    TREATMENT OF CARDIAC ARRHYTHMIA N/A 5/25/2021    Procedure: CARDIOVERSION;  Surgeon: Vinayak Eric MD;  Location: [facility] EP LAB;  Service: Cardiology;  Laterality: N/A;  AF, HILARIO (Cx if compliant), DCCV, MAC, NH, 3Prep     Current Outpatient Medications   Medication Sig    albuterol (PROVENTIL/VENTOLIN HFA) 90 mcg/actuation inhaler     b complex vitamins tablet Take 1 tablet by mouth once daily.    ELIQUIS 5 mg Tab TAKE 1 TABLET BY MOUTH TWO TIMES DAILY    ferrous sulfate (FEOSOL) Tab tablet Take 1 tablet by mouth daily with breakfast.    folic acid (FOLVITE) 400 MCG tablet Take 400 mcg by mouth once daily.    furosemide (LASIX) 40 MG tablet Take 1 tablet (40 mg total) by mouth 2 (two) times a day.    glipiZIDE (GLUCOTROL) 5 MG tablet Take 5 mg by mouth once daily.    levothyroxine (SYNTHROID) 100 MCG tablet Take 100 mcg by mouth once daily.     LORazepam (ATIVAN) 0.5 MG tablet Take 0.5 mg by mouth every 6 (six) hours as needed for Anxiety.    metformin (GLUCOPHAGE) 500 MG tablet Take 500 mg by mouth 2 (two) times daily with meals.    metoprolol succinate (TOPROL-XL) 100 MG 24 hr tablet Take 1 tablet (100 mg total) by mouth once daily.    pantoprazole (PROTONIX) 40 MG tablet Take 40 mg by mouth once daily.    simvastatin (ZOCOR) 40 MG tablet Take 40 mg by mouth every evening.    sodium,potassium,mag sulfates (SUPREP BOWEL PREP KIT) 17.5-3.13-1.6 gram SolR Take 177 mLs by mouth once  daily.    spironolactone (ALDACTONE) 100 MG tablet Take 100 mg by mouth.     No current facility-administered medications for this visit.     Facility-Administered Medications Ordered in Other Visits   Medication    sodium chloride 0.9% bolus 1,000 mL       Objective:      Physical Exam  HENT:      Head: Normocephalic.   Eyes:      Pupils: Pupils are equal, round, and reactive to light.   Neck:      Thyroid: No thyromegaly.   Cardiovascular:      Rate and Rhythm: Normal rate and regular rhythm.      Heart sounds: Normal heart sounds.   Pulmonary:      Effort: Pulmonary effort is normal.      Breath sounds: Normal breath sounds. No wheezing.   Abdominal:      General: There is no distension.      Palpations: Abdomen is soft. There is no mass.      Tenderness: There is no abdominal tenderness.   Lymphadenopathy:      Cervical: No cervical adenopathy.   Skin:     General: Skin is warm.      Findings: No erythema or rash.   Neurological:      Mental Status: He is alert and oriented to person, place, and time.   Psychiatric:         Behavior: Behavior normal.         Assessment:       1. Type 2 diabetes mellitus with hyperglycemia, without long-term current use of insulin    2. Other ascites    3. Alcoholic cirrhosis of liver with ascites    4. Diabetes mellitus due to underlying condition with hyperglycemia, without long-term current use of insulin        Plan:   He has mildly decompensated alcohol related liver disease and his main issue has been as ascites managed with infrequent paracentesis and edema managed with low-dose diuretics.      In the last few weeks he has noticed a significant improvement in his overall state of health and he has not needed a paracentesis for about a month.  His edema has gone.  He remains on furosemide 40 mg daily and spironolactone 100 mg daily.    I have reiterated the importance of abstinence from alcohol as well as the importance of frequent checkups to screen for HCC.    Advised on  a low-salt diet and avoidance of raw oysters.  Labs today  Clinic in 3 months.

## 2023-07-10 LAB — ANA SER QL IF: NORMAL

## 2023-07-11 LAB
A1AT PHENOTYP SERPL-IMP: NORMAL
A1AT SERPL NEPH-MCNC: 167 MG/DL (ref 100–190)

## 2023-07-12 LAB — MITOCHONDRIA AB TITR SER IF: NORMAL {TITER}

## 2023-07-14 LAB — SMOOTH MUSCLE AB TITR SER IF: ABNORMAL {TITER}

## 2023-09-08 ENCOUNTER — LAB VISIT (OUTPATIENT)
Dept: LAB | Facility: HOSPITAL | Age: 78
End: 2023-09-08
Attending: INTERNAL MEDICINE
Payer: MEDICARE

## 2023-09-08 DIAGNOSIS — N18.32 CHRONIC KIDNEY DISEASE, STAGE 3B: Primary | ICD-10-CM

## 2023-09-08 LAB
ALBUMIN SERPL BCP-MCNC: 3.7 G/DL (ref 3.5–5.2)
ANION GAP SERPL CALC-SCNC: 9 MMOL/L (ref 8–16)
BACTERIA #/AREA URNS AUTO: ABNORMAL /HPF
BASOPHILS # BLD AUTO: 0.07 K/UL (ref 0–0.2)
BASOPHILS NFR BLD: 1.1 % (ref 0–1.9)
BILIRUB UR QL STRIP: NEGATIVE
CALCIUM SERPL-MCNC: 9.6 MG/DL (ref 8.7–10.5)
CHLORIDE SERPL-SCNC: 106 MMOL/L (ref 95–110)
CLARITY UR REFRACT.AUTO: ABNORMAL
CO2 SERPL-SCNC: 22 MMOL/L (ref 23–29)
COLOR UR AUTO: YELLOW
CREAT SERPL-MCNC: 2.1 MG/DL (ref 0.5–1.4)
CREAT UR-MCNC: 36.7 MG/DL (ref 23–375)
DIFFERENTIAL METHOD: ABNORMAL
EOSINOPHIL # BLD AUTO: 0.1 K/UL (ref 0–0.5)
EOSINOPHIL NFR BLD: 2 % (ref 0–8)
ERYTHROCYTE [DISTWIDTH] IN BLOOD BY AUTOMATED COUNT: 14.5 % (ref 11.5–14.5)
EST. GFR  (NO RACE VARIABLE): 31.8 ML/MIN/1.73 M^2
GLUCOSE SERPL-MCNC: 86 MG/DL (ref 70–110)
GLUCOSE UR QL STRIP: ABNORMAL
HCT VFR BLD AUTO: 40.1 % (ref 40–54)
HGB BLD-MCNC: 13.4 G/DL (ref 14–18)
HGB UR QL STRIP: ABNORMAL
IMM GRANULOCYTES # BLD AUTO: 0.05 K/UL (ref 0–0.04)
IMM GRANULOCYTES NFR BLD AUTO: 0.8 % (ref 0–0.5)
KETONES UR QL STRIP: NEGATIVE
LEUKOCYTE ESTERASE UR QL STRIP: ABNORMAL
LYMPHOCYTES # BLD AUTO: 1.3 K/UL (ref 1–4.8)
LYMPHOCYTES NFR BLD: 19.6 % (ref 18–48)
MCH RBC QN AUTO: 34.6 PG (ref 27–31)
MCHC RBC AUTO-ENTMCNC: 33.4 G/DL (ref 32–36)
MCV RBC AUTO: 104 FL (ref 82–98)
MICROSCOPIC COMMENT: ABNORMAL
MONOCYTES # BLD AUTO: 0.6 K/UL (ref 0.3–1)
MONOCYTES NFR BLD: 9.2 % (ref 4–15)
NEUTROPHILS # BLD AUTO: 4.5 K/UL (ref 1.8–7.7)
NEUTROPHILS NFR BLD: 67.3 % (ref 38–73)
NITRITE UR QL STRIP: NEGATIVE
NRBC BLD-RTO: 0 /100 WBC
PH UR STRIP: 6 [PH] (ref 5–8)
PHOSPHATE SERPL-MCNC: 4.8 MG/DL (ref 2.7–4.5)
PLATELET # BLD AUTO: 219 K/UL (ref 150–450)
PMV BLD AUTO: 10.3 FL (ref 9.2–12.9)
POTASSIUM SERPL-SCNC: 6.1 MMOL/L (ref 3.5–5.1)
PROT UR QL STRIP: NEGATIVE
PROT UR-MCNC: <7 MG/DL (ref 0–15)
PROT/CREAT UR: NORMAL MG/G{CREAT} (ref 0–0.2)
PTH-INTACT SERPL-MCNC: 65.7 PG/ML (ref 9–77)
RBC # BLD AUTO: 3.87 M/UL (ref 4.6–6.2)
RBC #/AREA URNS AUTO: 5 /HPF (ref 0–4)
SODIUM SERPL-SCNC: 137 MMOL/L (ref 136–145)
SP GR UR STRIP: 1.01 (ref 1–1.03)
URATE SERPL-MCNC: 4.7 MG/DL (ref 3.4–7)
URN SPEC COLLECT METH UR: ABNORMAL
UROBILINOGEN UR STRIP-ACNC: NEGATIVE EU/DL
UUN UR-MCNC: 46 MG/DL (ref 2–20)
WBC # BLD AUTO: 6.64 K/UL (ref 3.9–12.7)
WBC #/AREA URNS AUTO: 65 /HPF (ref 0–5)
WBC CLUMPS UR QL AUTO: ABNORMAL

## 2023-09-08 PROCEDURE — 84550 ASSAY OF BLOOD/URIC ACID: CPT | Performed by: INTERNAL MEDICINE

## 2023-09-08 PROCEDURE — 80069 RENAL FUNCTION PANEL: CPT | Mod: PO | Performed by: INTERNAL MEDICINE

## 2023-09-08 PROCEDURE — 36415 COLL VENOUS BLD VENIPUNCTURE: CPT | Mod: PO | Performed by: INTERNAL MEDICINE

## 2023-09-08 PROCEDURE — 84156 ASSAY OF PROTEIN URINE: CPT | Performed by: INTERNAL MEDICINE

## 2023-09-08 PROCEDURE — 85025 COMPLETE CBC W/AUTO DIFF WBC: CPT | Mod: PO | Performed by: INTERNAL MEDICINE

## 2023-09-08 PROCEDURE — 81000 URINALYSIS NONAUTO W/SCOPE: CPT | Mod: PO | Performed by: INTERNAL MEDICINE

## 2023-09-08 PROCEDURE — 83970 ASSAY OF PARATHORMONE: CPT | Mod: PO | Performed by: INTERNAL MEDICINE

## 2023-09-18 DIAGNOSIS — I48.19 PERSISTENT ATRIAL FIBRILLATION: Primary | ICD-10-CM

## 2023-09-21 ENCOUNTER — HOSPITAL ENCOUNTER (OUTPATIENT)
Dept: CARDIOLOGY | Facility: HOSPITAL | Age: 78
Discharge: HOME | End: 2023-09-21
Attending: INTERNAL MEDICINE
Payer: MEDICARE

## 2023-09-21 DIAGNOSIS — I48.19 PERSISTENT ATRIAL FIBRILLATION: ICD-10-CM

## 2023-09-21 PROCEDURE — 93227 XTRNL ECG REC<48 HR R&I: CPT | Mod: ,,, | Performed by: INTERNAL MEDICINE

## 2023-09-21 PROCEDURE — 93226 XTRNL ECG REC<48 HR SCAN A/R: CPT | Mod: PO

## 2023-09-21 PROCEDURE — 93227 HOLTER MONITOR - 24 HOUR (CUPID ONLY): ICD-10-PCS | Mod: ,,, | Performed by: INTERNAL MEDICINE

## 2023-09-22 LAB
OHS CV EVENT MONITOR DAY: 0
OHS CV HOLTER LENGTH DECIMAL HOURS: 23.98
OHS CV HOLTER LENGTH HOURS: 23
OHS CV HOLTER LENGTH MINUTES: 59
OHS CV HOLTER SINUS AVERAGE HR: 86
OHS CV HOLTER SINUS MAX HR: 129
OHS CV HOLTER SINUS MIN HR: 56

## 2023-09-25 DIAGNOSIS — N40.0 BPH WITHOUT OBSTRUCTION/LOWER URINARY TRACT SYMPTOMS: Primary | ICD-10-CM

## 2023-09-28 ENCOUNTER — HOSPITAL ENCOUNTER (OUTPATIENT)
Dept: RADIOLOGY | Facility: HOSPITAL | Age: 78
Discharge: HOME | End: 2023-09-28
Attending: UROLOGY
Payer: MEDICARE

## 2023-09-28 DIAGNOSIS — N40.0 BPH WITHOUT OBSTRUCTION/LOWER URINARY TRACT SYMPTOMS: ICD-10-CM

## 2023-09-28 PROCEDURE — 76857 US BLADDER: ICD-10-PCS | Mod: 26,,, | Performed by: RADIOLOGY

## 2023-09-28 PROCEDURE — 76857 US EXAM PELVIC LIMITED: CPT | Mod: TC,PN

## 2023-09-28 PROCEDURE — 76857 US EXAM PELVIC LIMITED: CPT | Mod: 26,,, | Performed by: RADIOLOGY

## 2023-10-10 ENCOUNTER — OFFICE VISIT (OUTPATIENT)
Dept: ELECTROPHYSIOLOGY | Facility: CLINIC | Age: 78
End: 2023-10-10
Payer: MEDICARE

## 2023-10-10 ENCOUNTER — HOSPITAL ENCOUNTER (OUTPATIENT)
Dept: CARDIOLOGY | Facility: CLINIC | Age: 78
Discharge: HOME | End: 2023-10-10
Payer: MEDICARE

## 2023-10-10 VITALS
HEIGHT: 68 IN | WEIGHT: 230.38 LBS | HEART RATE: 87 BPM | BODY MASS INDEX: 34.92 KG/M2 | SYSTOLIC BLOOD PRESSURE: 120 MMHG | DIASTOLIC BLOOD PRESSURE: 70 MMHG

## 2023-10-10 DIAGNOSIS — I50.31 ACUTE DIASTOLIC (CONGESTIVE) HEART FAILURE: ICD-10-CM

## 2023-10-10 DIAGNOSIS — E66.01 SEVERE OBESITY (BMI 35.0-39.9) WITH COMORBIDITY: ICD-10-CM

## 2023-10-10 DIAGNOSIS — I10 ESSENTIAL HYPERTENSION: ICD-10-CM

## 2023-10-10 DIAGNOSIS — E11.65 TYPE 2 DIABETES MELLITUS WITH HYPERGLYCEMIA, WITHOUT LONG-TERM CURRENT USE OF INSULIN: ICD-10-CM

## 2023-10-10 DIAGNOSIS — R18.8 CIRRHOSIS OF LIVER WITH ASCITES, UNSPECIFIED HEPATIC CIRRHOSIS TYPE: ICD-10-CM

## 2023-10-10 DIAGNOSIS — I48.19 PERSISTENT ATRIAL FIBRILLATION: Primary | ICD-10-CM

## 2023-10-10 DIAGNOSIS — K74.60 CIRRHOSIS OF LIVER WITH ASCITES, UNSPECIFIED HEPATIC CIRRHOSIS TYPE: ICD-10-CM

## 2023-10-10 DIAGNOSIS — I48.19 PERSISTENT ATRIAL FIBRILLATION: ICD-10-CM

## 2023-10-10 PROCEDURE — 1126F AMNT PAIN NOTED NONE PRSNT: CPT | Mod: CPTII,S$GLB,, | Performed by: INTERNAL MEDICINE

## 2023-10-10 PROCEDURE — 1101F PR PT FALLS ASSESS DOC 0-1 FALLS W/OUT INJ PAST YR: ICD-10-PCS | Mod: CPTII,S$GLB,, | Performed by: INTERNAL MEDICINE

## 2023-10-10 PROCEDURE — 3074F SYST BP LT 130 MM HG: CPT | Mod: CPTII,S$GLB,, | Performed by: INTERNAL MEDICINE

## 2023-10-10 PROCEDURE — 99214 PR OFFICE/OUTPT VISIT, EST, LEVL IV, 30-39 MIN: ICD-10-PCS | Mod: S$GLB,,, | Performed by: INTERNAL MEDICINE

## 2023-10-10 PROCEDURE — 99999 PR PBB SHADOW E&M-EST. PATIENT-LVL IV: ICD-10-PCS | Mod: PBBFAC,,, | Performed by: INTERNAL MEDICINE

## 2023-10-10 PROCEDURE — 93005 ELECTROCARDIOGRAM TRACING: CPT | Mod: S$GLB,,, | Performed by: INTERNAL MEDICINE

## 2023-10-10 PROCEDURE — 3078F DIAST BP <80 MM HG: CPT | Mod: CPTII,S$GLB,, | Performed by: INTERNAL MEDICINE

## 2023-10-10 PROCEDURE — 1160F RVW MEDS BY RX/DR IN RCRD: CPT | Mod: CPTII,S$GLB,, | Performed by: INTERNAL MEDICINE

## 2023-10-10 PROCEDURE — 99999 PR PBB SHADOW E&M-EST. PATIENT-LVL IV: CPT | Mod: PBBFAC,,, | Performed by: INTERNAL MEDICINE

## 2023-10-10 PROCEDURE — 3074F PR MOST RECENT SYSTOLIC BLOOD PRESSURE < 130 MM HG: ICD-10-PCS | Mod: CPTII,S$GLB,, | Performed by: INTERNAL MEDICINE

## 2023-10-10 PROCEDURE — 1160F PR REVIEW ALL MEDS BY PRESCRIBER/CLIN PHARMACIST DOCUMENTED: ICD-10-PCS | Mod: CPTII,S$GLB,, | Performed by: INTERNAL MEDICINE

## 2023-10-10 PROCEDURE — 93010 RHYTHM STRIP: ICD-10-PCS | Mod: S$GLB,,, | Performed by: INTERNAL MEDICINE

## 2023-10-10 PROCEDURE — 93010 ELECTROCARDIOGRAM REPORT: CPT | Mod: S$GLB,,, | Performed by: INTERNAL MEDICINE

## 2023-10-10 PROCEDURE — 1159F MED LIST DOCD IN RCRD: CPT | Mod: CPTII,S$GLB,, | Performed by: INTERNAL MEDICINE

## 2023-10-10 PROCEDURE — 3288F FALL RISK ASSESSMENT DOCD: CPT | Mod: CPTII,S$GLB,, | Performed by: INTERNAL MEDICINE

## 2023-10-10 PROCEDURE — 3288F PR FALLS RISK ASSESSMENT DOCUMENTED: ICD-10-PCS | Mod: CPTII,S$GLB,, | Performed by: INTERNAL MEDICINE

## 2023-10-10 PROCEDURE — 93005 RHYTHM STRIP: ICD-10-PCS | Mod: S$GLB,,, | Performed by: INTERNAL MEDICINE

## 2023-10-10 PROCEDURE — 1101F PT FALLS ASSESS-DOCD LE1/YR: CPT | Mod: CPTII,S$GLB,, | Performed by: INTERNAL MEDICINE

## 2023-10-10 PROCEDURE — 1159F PR MEDICATION LIST DOCUMENTED IN MEDICAL RECORD: ICD-10-PCS | Mod: CPTII,S$GLB,, | Performed by: INTERNAL MEDICINE

## 2023-10-10 PROCEDURE — 3078F PR MOST RECENT DIASTOLIC BLOOD PRESSURE < 80 MM HG: ICD-10-PCS | Mod: CPTII,S$GLB,, | Performed by: INTERNAL MEDICINE

## 2023-10-10 PROCEDURE — 99214 OFFICE O/P EST MOD 30 MIN: CPT | Mod: S$GLB,,, | Performed by: INTERNAL MEDICINE

## 2023-10-10 PROCEDURE — 1126F PR PAIN SEVERITY QUANTIFIED, NO PAIN PRESENT: ICD-10-PCS | Mod: CPTII,S$GLB,, | Performed by: INTERNAL MEDICINE

## 2023-10-10 RX ORDER — DABIGATRAN ETEXILATE 150 MG/1
150 CAPSULE ORAL 2 TIMES DAILY
Qty: 60 CAPSULE | Refills: 11 | Status: SHIPPED | OUTPATIENT
Start: 2023-10-10

## 2023-10-10 RX ORDER — AZELASTINE 1 MG/ML
1 SPRAY, METERED NASAL 2 TIMES DAILY
COMMUNITY

## 2023-10-10 RX ORDER — METOPROLOL SUCCINATE 50 MG/1
50 TABLET, EXTENDED RELEASE ORAL DAILY
Start: 2023-10-10

## 2023-10-10 RX ORDER — MIDODRINE HYDROCHLORIDE 2.5 MG/1
2.5 TABLET ORAL 3 TIMES DAILY
COMMUNITY

## 2023-10-10 RX ORDER — FLUTICASONE PROPIONATE 50 UG/1
POWDER, METERED RESPIRATORY (INHALATION)
COMMUNITY

## 2023-10-10 RX ORDER — PANTOPRAZOLE SODIUM 40 MG/1
40 TABLET, DELAYED RELEASE ORAL DAILY
COMMUNITY

## 2023-10-10 NOTE — PROGRESS NOTES
PCP - Dylon Chase MD  Subjective:   Philip Kam Sr. is a 77 y.o. male who presents for evaluation of AF     Primary Care Physician: Suraj Chase III, MD    HPI  Prior Hx:  I had the pleasure of seeing Mr. Eddy Banuelos today in our electrophysiology clinic in consultation for his recently diagnosed atrial fibrillation. As you are aware he is a pleasant 75 year-old man with hypertension, diabetes mellitus type 2 and obesity. He was in his usual state of health until he presented to the ER on 3/13/2021 with back pain and lower extremity edema. He was observed to be in atrial fibrillation with RVR, which was a new diagnosis for him. TSH was normal. BNP was mildly elevated. He was given IV lasix and metoprolol and was discharged on metoprolol and eliquis. An echocardiogram was performed on 3/22/2021 which noted preserved LV function and mild left atrial enlargement. He presents for evaluation.     I reviewed available electrocardiograms in Epic which show sinus rhythm with PACs until 3/13/2021 which shows AF (last prior ECG was from 2018). A holter monitor from 4/2018 noted sinus rhythm with 15% PAC burden.      7/2021:  Mr. Eddy Banuelos returns for follow-up. He underwent DCCV in April 2021 with recurrence noted on his 1 week post-DCCV ECG. His flecainide was increased and DCCV was performed again 5/2021. 1 week post ecg noted AF again. He elected to not pursue any further rhythm control therapy. Recent holter monitor noted adequate rate control. He feels well.     My interpretation of today's in clinic electrocardiogram is AF with an average ventricular rate of 93 bpm.    10/2023:  Last seen in 2021 with plans for rate control with metoprolol.   Feels well overall from a rhythm standpoint. Has developed cirrhosis s/p paracentesis. Followed by hepatology and managing diuretics. Also seen by nephrology for CKD. Metoprolol decreased to 50 mg daily.   Holter 9/23: with controlled persistent AF  Cr 2.1    I  "personally reviewed the ECG/telemetry strip today which shows AF     History:     Social History     Tobacco Use    Smoking status: Never    Smokeless tobacco: Never   Substance Use Topics    Alcohol use: Yes     Alcohol/week: 1.0 standard drink of alcohol     Types: 1 Cans of beer per week     Comment: Everyother day_Pt stated,"I drink a few beers."     Family History   Problem Relation Age of Onset    Heart attack Son        Meds:     Review of patient's allergies indicates:   Allergen Reactions    Codeine Itching       Current Outpatient Medications:     albuterol (PROVENTIL/VENTOLIN HFA) 90 mcg/actuation inhaler, , Disp: , Rfl:     azelastine (ASTELIN) 137 mcg (0.1 %) nasal spray, 1 spray by Nasal route 2 (two) times daily., Disp: , Rfl:     b complex vitamins tablet, Take 1 tablet by mouth once daily., Disp: , Rfl:     ELIQUIS 5 mg Tab, TAKE 1 TABLET BY MOUTH TWO TIMES DAILY, Disp: 60 tablet, Rfl: 10    ferrous sulfate (FEOSOL) Tab tablet, Take 1 tablet by mouth daily with breakfast., Disp: , Rfl:     fluticasone propionate (FLOVENT DISKUS) 50 mcg/actuation DsDv, Inhale into the lungs. Controller, Disp: , Rfl:     folic acid (FOLVITE) 400 MCG tablet, Take 400 mcg by mouth once daily., Disp: , Rfl:     furosemide (LASIX) 40 MG tablet, Take 1 tablet (40 mg total) by mouth 2 (two) times a day., Disp: 60 tablet, Rfl: 0    glipiZIDE (GLUCOTROL) 5 MG tablet, Take 5 mg by mouth once daily., Disp: , Rfl:     levothyroxine (SYNTHROID) 100 MCG tablet, Take 100 mcg by mouth once daily. , Disp: , Rfl:     LORazepam (ATIVAN) 0.5 MG tablet, Take 0.5 mg by mouth every 6 (six) hours as needed for Anxiety., Disp: , Rfl:     metoprolol succinate (TOPROL-XL) 100 MG 24 hr tablet, Take 1 tablet (100 mg total) by mouth once daily., Disp: 90 tablet, Rfl: 3    midodrine (PROAMATINE) 2.5 MG Tab, Take 2.5 mg by mouth 3 (three) times daily., Disp: , Rfl:     pantoprazole (PROTONIX) 40 MG tablet, Take 40 mg by mouth once daily., Disp: , " "Rfl:     pantoprazole (PROTONIX) 40 MG tablet, Take 40 mg by mouth once daily., Disp: , Rfl:     simvastatin (ZOCOR) 40 MG tablet, Take 40 mg by mouth every evening., Disp: , Rfl:     SITagliptin phosphate (JANUVIA) 50 MG Tab, Take 25 mg by mouth once daily., Disp: , Rfl:     metformin (GLUCOPHAGE) 500 MG tablet, Take 500 mg by mouth 2 (two) times daily with meals., Disp: , Rfl:     sodium,potassium,mag sulfates (SUPREP BOWEL PREP KIT) 17.5-3.13-1.6 gram SolR, Take 177 mLs by mouth once daily. (Patient not taking: Reported on 10/10/2023), Disp: 1 kit, Rfl: 0    spironolactone (ALDACTONE) 100 MG tablet, Take 100 mg by mouth., Disp: , Rfl:   No current facility-administered medications for this visit.    Facility-Administered Medications Ordered in Other Visits:     sodium chloride 0.9% bolus 1,000 mL, 1,000 mL, Intravenous, Once, Ernestine Ahn, NP    Constitution: Negative for fever or chills. Negative for weight loss or gain.   HENT: Negative for sore throat or headaches. Negative for rhinorrhea.  Eyes: Negative for blurred or double vision.   Cardiovascular: See above  Pulmonary: Negative for SOB. Negative for cough.   Gastrointestinal: Negative for abdominal pain. Negative for nausea/ vomiting. Negative for diarrhea.   : Negative for dysuria.   Neurological: Negative for focal weakness or sensory changes.    Objective:   /70   Pulse 87   Ht 5' 8" (1.727 m)   Wt 104.5 kg (230 lb 6.1 oz)   BMI 35.03 kg/m²     General: NAD. AAO.  HENT: No scleral icterus. Extraocular movements intact.  Neck: No JVD  Cardiovascular: Regular heart rate and rhythm. S1/S2 appreciated.  Respiratory: CTAB. No increased work of breathing.  Extremities: Warm. No edema.  Skin: no ulceration or wounds present    Labs & Imaging   Reviewed in clinic today    Assessment:   Philip Kam  is a 77 y.o. y.o. male who presents today for follow-up regarding permanent AF (rate controlled). CHADS-VASc 4 but with Child-Damon class " B. He should remain on OAC, however, contraindicated to use Eliquis. Discussed with patient.     1. Persistent atrial fibrillation        2. Essential hypertension            Plan:     - Start Pradaxa  - Stop Eliquis  - Continue Toprol XL 50 mg daily    Saul Mohr MD, PGY8  Electrophysiology

## 2023-10-11 ENCOUNTER — TELEPHONE (OUTPATIENT)
Dept: ELECTROPHYSIOLOGY | Facility: CLINIC | Age: 78
End: 2023-10-11
Payer: MEDICARE

## 2023-10-11 NOTE — TELEPHONE ENCOUNTER
Spoke with patient who states that the Pradaxa osts $180 /month which is unaffordable. Advised that I will complete a PA to see if it will help with the price and let her know once determination is received.

## 2023-10-11 NOTE — TELEPHONE ENCOUNTER
----- Message from Lakeisha Verduzco MA sent at 10/11/2023  3:36 PM CDT -----  Regarding: RE: Med    ----- Message -----  From: Lana Srinivasan  Sent: 10/11/2023  12:11 PM CDT  To: [facility] Staff  Subject: Med                                              Patient wife calling to speak with staff because she was told to call the office if she have an issue with new drug. Insurance want cover his blood thinners. Please call back @ [phone]. Thank you Lana

## 2023-10-20 ENCOUNTER — TELEPHONE (OUTPATIENT)
Dept: ELECTROPHYSIOLOGY | Facility: CLINIC | Age: 78
End: 2023-10-20
Payer: MEDICARE

## 2023-10-24 ENCOUNTER — TELEPHONE (OUTPATIENT)
Dept: ELECTROPHYSIOLOGY | Facility: CLINIC | Age: 78
End: 2023-10-24
Payer: MEDICARE

## 2023-10-24 NOTE — TELEPHONE ENCOUNTER
LATE ENTRY - 10/17/2023    Denial letter received regarding Pradaxa PA from [facility].  Option for treating physician to contact [facility] to discuss coverage determination noted and discussed with Dr Eric. [facility] contacted per Dr Eric and advised that PA was forwarded to appeals.       10/24/2023 - [facility] contacted and notification received that Pradaxa was approved on 10/18/23.

## 2023-10-24 NOTE — TELEPHONE ENCOUNTER
Spoke with patient's wife and advised that the Pradaxa has been approved with a co pay of $39.80/ month. Advised that I spoke with their pharmacy and the medication has to be ordered but will be ready tomorrow afternoon. Patient's wife states that patient has continued taking Eliquis and will stop the Eliquis and start Pradaxa once the prescription is picked up.  .

## 2023-10-25 ENCOUNTER — TELEPHONE (OUTPATIENT)
Dept: ELECTROPHYSIOLOGY | Facility: CLINIC | Age: 78
End: 2023-10-25
Payer: MEDICARE

## 2023-10-25 NOTE — TELEPHONE ENCOUNTER
Pt's mother stated she mistakenly said pt received flu vaccine this season, as charted during pre admit. Pt and mother state that pt did NOT receive flu vaccine this season. Pt requests vaccine to be given post op   Spoke with patient's wife on 10/24/2023. See telephone message.

## 2023-10-25 NOTE — TELEPHONE ENCOUNTER
----- Message from Lakeisha Verduzco MA sent at 10/25/2023  3:36 PM CDT -----  Regarding: RE: Meds    ----- Message -----  From: Lakeisha Verduzco MA  Sent: 10/23/2023   4:58 PM CDT  To: Lakeisha Verduzco MA  Subject: RE: Meds                                           ----- Message -----  From: Lana Srinivasan  Sent: 10/23/2023   2:50 PM CDT  To: [facility] Staff  Subject: Meds                                             Patient wife Merna calling to speak with staff because her  was seen by his pcp and was diagnosis with liver disease. Please call back @ [phone]/ [phone]. Thank you Lana

## 2023-10-31 ENCOUNTER — OFFICE VISIT (OUTPATIENT)
Dept: HEPATOLOGY | Facility: CLINIC | Age: 78
End: 2023-10-31
Payer: MEDICARE

## 2023-10-31 VITALS
HEIGHT: 68 IN | SYSTOLIC BLOOD PRESSURE: 129 MMHG | DIASTOLIC BLOOD PRESSURE: 62 MMHG | HEART RATE: 95 BPM | BODY MASS INDEX: 34.21 KG/M2 | TEMPERATURE: 97 F | OXYGEN SATURATION: 99 % | WEIGHT: 225.75 LBS

## 2023-10-31 DIAGNOSIS — K70.31 ALCOHOLIC CIRRHOSIS OF LIVER WITH ASCITES: Primary | ICD-10-CM

## 2023-10-31 PROCEDURE — 3078F DIAST BP <80 MM HG: CPT | Mod: CPTII,S$GLB,, | Performed by: INTERNAL MEDICINE

## 2023-10-31 PROCEDURE — 99999 PR PBB SHADOW E&M-EST. PATIENT-LVL IV: CPT | Mod: PBBFAC,,, | Performed by: INTERNAL MEDICINE

## 2023-10-31 PROCEDURE — 3074F PR MOST RECENT SYSTOLIC BLOOD PRESSURE < 130 MM HG: ICD-10-PCS | Mod: CPTII,S$GLB,, | Performed by: INTERNAL MEDICINE

## 2023-10-31 PROCEDURE — 99215 PR OFFICE/OUTPT VISIT, EST, LEVL V, 40-54 MIN: ICD-10-PCS | Mod: S$GLB,,, | Performed by: INTERNAL MEDICINE

## 2023-10-31 PROCEDURE — 1159F MED LIST DOCD IN RCRD: CPT | Mod: CPTII,S$GLB,, | Performed by: INTERNAL MEDICINE

## 2023-10-31 PROCEDURE — 1159F PR MEDICATION LIST DOCUMENTED IN MEDICAL RECORD: ICD-10-PCS | Mod: CPTII,S$GLB,, | Performed by: INTERNAL MEDICINE

## 2023-10-31 PROCEDURE — 3288F PR FALLS RISK ASSESSMENT DOCUMENTED: ICD-10-PCS | Mod: CPTII,S$GLB,, | Performed by: INTERNAL MEDICINE

## 2023-10-31 PROCEDURE — 3288F FALL RISK ASSESSMENT DOCD: CPT | Mod: CPTII,S$GLB,, | Performed by: INTERNAL MEDICINE

## 2023-10-31 PROCEDURE — 99999 PR PBB SHADOW E&M-EST. PATIENT-LVL IV: ICD-10-PCS | Mod: PBBFAC,,, | Performed by: INTERNAL MEDICINE

## 2023-10-31 PROCEDURE — 3078F PR MOST RECENT DIASTOLIC BLOOD PRESSURE < 80 MM HG: ICD-10-PCS | Mod: CPTII,S$GLB,, | Performed by: INTERNAL MEDICINE

## 2023-10-31 PROCEDURE — 1101F PR PT FALLS ASSESS DOC 0-1 FALLS W/OUT INJ PAST YR: ICD-10-PCS | Mod: CPTII,S$GLB,, | Performed by: INTERNAL MEDICINE

## 2023-10-31 PROCEDURE — 1126F PR PAIN SEVERITY QUANTIFIED, NO PAIN PRESENT: ICD-10-PCS | Mod: CPTII,S$GLB,, | Performed by: INTERNAL MEDICINE

## 2023-10-31 PROCEDURE — 3074F SYST BP LT 130 MM HG: CPT | Mod: CPTII,S$GLB,, | Performed by: INTERNAL MEDICINE

## 2023-10-31 PROCEDURE — 1126F AMNT PAIN NOTED NONE PRSNT: CPT | Mod: CPTII,S$GLB,, | Performed by: INTERNAL MEDICINE

## 2023-10-31 PROCEDURE — 99215 OFFICE O/P EST HI 40 MIN: CPT | Mod: S$GLB,,, | Performed by: INTERNAL MEDICINE

## 2023-10-31 PROCEDURE — 1101F PT FALLS ASSESS-DOCD LE1/YR: CPT | Mod: CPTII,S$GLB,, | Performed by: INTERNAL MEDICINE

## 2023-10-31 NOTE — PROGRESS NOTES
Subjective:       Patient ID: Philip Kam Sr. is a 78 y.o. male.    Chief Complaint: No chief complaint on file.    HPI  I saw this 78 y.o. with decompensated alcohol related cirrhosis since April 2023. He came to the liver clinic with his wife.  This was a follow up appointment - first saw him in July 2023    Used to drink 3-4 large whiskies per day  Stopped 3 months ago with the diagnosis of liver disease    Ascites/edema/trouble breathing  Managed by PCP with labs and paracentesis  - drained x3 - every 6 weeks    On low dose diuretics  Weak and tired but no HE    Abdo US: 5/25/23  1. There is a moderate amount of ascites.  2. The liver has a nodular surface.  This is characteristic of hepatic cirrhosis.  3. The wall of the gallbladder appears to be thickened.  It measures 7 mm in thickness.  This may be secondary to the ascites.  There is a 23 mm stone in the gallbladder. There is no sonographic Celestin sign or common bile duct dilatation.  4. There is mild renal cortical thinning of both kidneys.    Abdo CT: 3/14/23  1. Cirrhotic diminutive liver with nodular surface contour and moderate to large volume ascites. Mild body wall edema. Small left pleural effusion. Patchy bibasilar ground-glass opacities, pulmonary edema versus small airway process with air trapping/mosaic perfusion.  Mild splenomegaly, 14 cm.  2. No bowel obstruction.  No focal intra-abdominal inflammatory process.  3. Bilateral symmetric renal atrophy/cortical thinning.  No hydronephrosis.  4. Contracted gallbladder with hyperdense lumen, question inspissated sludge.  No discrete gallstone.  No evidence of gallbladder inflammation    EGD: 3/21/23  - Small to medium (< 5 mm) esophageal varices.                          Mainly near GEJ, flatten with insufflation                          - Portal hypertensive gastropathy.                          - A single duodenal polyp. Resected and retrieved    2 D echo: 1/24/23  The left ventricle is  normal in size with concentric remodeling and normal systolic function.  There are segmental left ventricular wall motion abnormalities.  Normal right ventricular size with normal right ventricular systolic function.  Severe left atrial enlargement.  Mild mitral regurgitation.  There is mild-to-moderate aortic valve stenosis.  Aortic valve area is 1.14 cm2; peak velocity is 2.46 m/s; mean gradient is 17 mmHg.  The estimated ejection fraction is 55%.  Normal left ventricular diastolic function.    PMH:  CHF/ A fib- failed cardioversion x2- off eloquis and on pradaxa  Hypertension  DM  Hypothyroidism      Review of Systems   Constitutional:  Negative for activity change, appetite change, chills, fatigue, fever and unexpected weight change.   HENT:  Negative for hearing loss.    Eyes:  Negative for discharge and visual disturbance.   Respiratory:  Negative for cough, chest tightness, shortness of breath and wheezing.    Cardiovascular:  Negative for chest pain, palpitations and leg swelling.   Gastrointestinal:  Negative for abdominal distention, abdominal pain, constipation, diarrhea and nausea.   Genitourinary:  Negative for dysuria and frequency.   Musculoskeletal:  Negative for arthralgias and back pain.   Skin:  Negative for pallor and rash.   Neurological:  Negative for dizziness, tremors, speech difficulty and headaches.   Hematological:  Negative for adenopathy.   Psychiatric/Behavioral:  Negative for agitation and confusion.          Lab Results   Component Value Date    ALT 20 07/07/2023    AST 28 07/07/2023    ALKPHOS 88 07/07/2023    BILITOT 2.1 (H) 07/07/2023     Past Medical History:   Diagnosis Date    Alcoholic cirrhosis of liver with ascites 7/7/2023    Anticoagulant long-term use     Anxiety     CHF (congestive heart failure)     High cholesterol     Hypertension     Hypothyroidism     Type 2 diabetes mellitus, uncontrolled      Past Surgical History:   Procedure Laterality Date    BACK SURGERY       "COLONOSCOPY      COLONOSCOPY N/A 3/21/2023    Procedure: COLONOSCOPY;  Surgeon: Ronnie Hinton MD;  Location: [facility] ENDO;  Service: Endoscopy;  Laterality: N/A;    ESOPHAGOGASTRODUODENOSCOPY N/A 3/21/2023    Procedure: EGD (ESOPHAGOGASTRODUODENOSCOPY);  Surgeon: Ronnie Hinton MD;  Location: [facility] ENDO;  Service: Endoscopy;  Laterality: N/A;    ESOPHAGUS SURGERY  2012    EYE SURGERY      LUMBAR EPIDURAL INJECTION  01/2018    x2    TONSILLECTOMY      TREATMENT OF CARDIAC ARRHYTHMIA N/A 4/8/2021    Procedure: CARDIOVERSION;  Surgeon: Vinayak Eric MD;  Location: [facility] EP LAB;  Service: Cardiology;  Laterality: N/A;  a fib, dccv/hilario, mac, pr,, sscu    TREATMENT OF CARDIAC ARRHYTHMIA N/A 5/25/2021    Procedure: CARDIOVERSION;  Surgeon: Vinayak Erci MD;  Location: [facility] EP LAB;  Service: Cardiology;  Laterality: N/A;  AF, HILARIO (Cx if compliant), DCCV, MAC, NM, 3Prep     Current Outpatient Medications   Medication Sig    albuterol (PROVENTIL/VENTOLIN HFA) 90 mcg/actuation inhaler     azelastine (ASTELIN) 137 mcg (0.1 %) nasal spray 1 spray by Nasal route 2 (two) times daily.    b complex vitamins tablet Take 1 tablet by mouth once daily.    dabigatran etexilate (PRADAXA) 150 mg Cap Take 1 capsule (150 mg total) by mouth 2 (two) times a day. "Do NOT break, chew, or open capsules."    ferrous sulfate (FEOSOL) Tab tablet Take 1 tablet by mouth daily with breakfast.    fluticasone propionate (FLOVENT DISKUS) 50 mcg/actuation DsDv Inhale into the lungs. Controller    folic acid (FOLVITE) 400 MCG tablet Take 400 mcg by mouth once daily.    furosemide (LASIX) 40 MG tablet Take 1 tablet (40 mg total) by mouth 2 (two) times a day.    levothyroxine (SYNTHROID) 100 MCG tablet Take 100 mcg by mouth once daily.     LORazepam (ATIVAN) 0.5 MG tablet Take 0.5 mg by mouth every 6 (six) hours as needed for Anxiety.    metoprolol succinate (TOPROL-XL) 50 MG 24 hr tablet Take 1 tablet (50 mg total) by mouth once daily.    midodrine " (PROAMATINE) 2.5 MG Tab Take 2.5 mg by mouth 3 (three) times daily.    pantoprazole (PROTONIX) 40 MG tablet Take 40 mg by mouth once daily.    simvastatin (ZOCOR) 40 MG tablet Take 40 mg by mouth every evening.    SITagliptin phosphate (JANUVIA) 50 MG Tab Take 25 mg by mouth once daily.    glipiZIDE (GLUCOTROL) 5 MG tablet Take 5 mg by mouth once daily.    metformin (GLUCOPHAGE) 500 MG tablet Take 500 mg by mouth 2 (two) times daily with meals.    pantoprazole (PROTONIX) 40 MG tablet Take 40 mg by mouth once daily.    sodium,potassium,mag sulfates (SUPREP BOWEL PREP KIT) 17.5-3.13-1.6 gram SolR Take 177 mLs by mouth once daily. (Patient not taking: Reported on 10/10/2023)    spironolactone (ALDACTONE) 100 MG tablet Take 100 mg by mouth.     No current facility-administered medications for this visit.     Facility-Administered Medications Ordered in Other Visits   Medication    sodium chloride 0.9% bolus 1,000 mL       Objective:      Physical Exam  HENT:      Head: Normocephalic.   Eyes:      Pupils: Pupils are equal, round, and reactive to light.   Neck:      Thyroid: No thyromegaly.   Cardiovascular:      Rate and Rhythm: Normal rate and regular rhythm.      Heart sounds: Normal heart sounds.   Pulmonary:      Effort: Pulmonary effort is normal.      Breath sounds: Normal breath sounds. No wheezing.   Abdominal:      General: There is no distension.      Palpations: Abdomen is soft. There is no mass.      Tenderness: There is no abdominal tenderness.   Lymphadenopathy:      Cervical: No cervical adenopathy.   Skin:     General: Skin is warm.      Findings: No erythema or rash.   Neurological:      Mental Status: He is alert and oriented to person, place, and time.   Psychiatric:         Behavior: Behavior normal.           Assessment:       1. Alcoholic cirrhosis of liver with ascites      Plan:   He has mildly decompensated alcohol related liver disease and his main issue has been as ascites managed with  infrequent paracentesis and edema managed with low-dose diuretics.      He has recently had some kidney impairment and his diuretics doses were lowered. Despite this, his edema/ascites are well controlled and he has not required a paracentesis for over 6 months.    I have reiterated the importance of abstinence from alcohol as well as the importance of frequent checkups to screen for HCC.    Advised on a low-salt diet and avoidance of raw oysters.  Labs today  Clinic in 3 months.      - US in Dec 2023 to screen for HCC and clinic in March 2024

## 2023-11-02 ENCOUNTER — LAB VISIT (OUTPATIENT)
Dept: LAB | Facility: HOSPITAL | Age: 78
End: 2023-11-02
Attending: INTERNAL MEDICINE
Payer: MEDICARE

## 2023-11-02 DIAGNOSIS — E87.5 HYPERKALEMIA: ICD-10-CM

## 2023-11-02 DIAGNOSIS — K70.31 ALCOHOLIC CIRRHOSIS OF LIVER WITH ASCITES: Primary | ICD-10-CM

## 2023-11-02 LAB
ALBUMIN SERPL BCP-MCNC: 3.7 G/DL (ref 3.5–5.2)
ANION GAP SERPL CALC-SCNC: 9 MMOL/L (ref 8–16)
CALCIUM SERPL-MCNC: 9.4 MG/DL (ref 8.7–10.5)
CHLORIDE SERPL-SCNC: 109 MMOL/L (ref 95–110)
CO2 SERPL-SCNC: 25 MMOL/L (ref 23–29)
CREAT SERPL-MCNC: 1.92 MG/DL (ref 0.5–1.4)
EST. GFR  (NO RACE VARIABLE): 35.2 ML/MIN/1.73 M^2
GLUCOSE SERPL-MCNC: 104 MG/DL (ref 70–110)
PHOSPHATE SERPL-MCNC: 4 MG/DL (ref 2.7–4.5)
POTASSIUM SERPL-SCNC: 4.9 MMOL/L (ref 3.5–5.1)
SODIUM SERPL-SCNC: 143 MMOL/L (ref 136–145)
UUN UR-MCNC: 36 MG/DL (ref 2–20)

## 2023-11-02 PROCEDURE — 36415 COLL VENOUS BLD VENIPUNCTURE: CPT | Mod: PN | Performed by: INTERNAL MEDICINE

## 2023-11-02 PROCEDURE — 80069 RENAL FUNCTION PANEL: CPT | Mod: PN | Performed by: INTERNAL MEDICINE

## 2023-12-18 ENCOUNTER — HOSPITAL ENCOUNTER (OUTPATIENT)
Dept: RADIOLOGY | Facility: HOSPITAL | Age: 78
Discharge: HOME | End: 2023-12-18
Attending: INTERNAL MEDICINE
Payer: MEDICARE

## 2023-12-18 DIAGNOSIS — K70.31 ALCOHOLIC CIRRHOSIS OF LIVER WITH ASCITES: ICD-10-CM

## 2023-12-18 PROCEDURE — 76705 US ABDOMEN LIMITED_LIVER: ICD-10-PCS | Mod: 26,,, | Performed by: RADIOLOGY

## 2023-12-18 PROCEDURE — 76705 ECHO EXAM OF ABDOMEN: CPT | Mod: TC,PN

## 2023-12-18 PROCEDURE — 76705 ECHO EXAM OF ABDOMEN: CPT | Mod: 26,,, | Performed by: RADIOLOGY

## 2024-01-09 ENCOUNTER — HOSPITAL ENCOUNTER (OUTPATIENT)
Dept: RADIOLOGY | Facility: HOSPITAL | Age: 79
Discharge: HOME | End: 2024-01-09
Attending: FAMILY MEDICINE
Payer: MEDICARE

## 2024-01-09 DIAGNOSIS — R18.8 ASCITES: ICD-10-CM

## 2024-01-09 PROCEDURE — 76700 US EXAM ABDOM COMPLETE: CPT | Mod: 26,,, | Performed by: RADIOLOGY

## 2024-01-09 PROCEDURE — 76700 US EXAM ABDOM COMPLETE: CPT | Mod: TC,PN

## 2024-02-06 ENCOUNTER — HOSPITAL ENCOUNTER (OUTPATIENT)
Dept: RADIOLOGY | Facility: HOSPITAL | Age: 79
Discharge: HOME | End: 2024-02-06
Attending: FAMILY MEDICINE
Payer: MEDICARE

## 2024-02-06 DIAGNOSIS — N18.32 STAGE 3B CHRONIC KIDNEY DISEASE: ICD-10-CM

## 2024-02-06 PROCEDURE — 76770 US EXAM ABDO BACK WALL COMP: CPT | Mod: 26,,, | Performed by: STUDENT IN AN ORGANIZED HEALTH CARE EDUCATION/TRAINING PROGRAM

## 2024-02-06 PROCEDURE — 76770 US EXAM ABDO BACK WALL COMP: CPT | Mod: TC,PN

## 2024-02-22 ENCOUNTER — TELEPHONE (OUTPATIENT)
Dept: HEPATOLOGY | Facility: CLINIC | Age: 79
End: 2024-02-22
Payer: MEDICARE

## 2024-02-22 NOTE — TELEPHONE ENCOUNTER
Left voice mail message regarding appointment on 2/29/24 with Dr. Petit needing to be canceled. Informed patient to call office to reschedule.

## 2024-04-01 ENCOUNTER — TELEPHONE (OUTPATIENT)
Dept: ELECTROPHYSIOLOGY | Facility: CLINIC | Age: 79
End: 2024-04-01
Payer: MEDICARE

## 2024-04-01 DIAGNOSIS — I48.19 PERSISTENT ATRIAL FIBRILLATION: Primary | ICD-10-CM

## 2024-04-24 ENCOUNTER — HOSPITAL ENCOUNTER (OUTPATIENT)
Facility: HOSPITAL | Age: 79
Discharge: HOME | End: 2024-04-25
Attending: STUDENT IN AN ORGANIZED HEALTH CARE EDUCATION/TRAINING PROGRAM | Admitting: STUDENT IN AN ORGANIZED HEALTH CARE EDUCATION/TRAINING PROGRAM
Payer: MEDICARE

## 2024-04-24 ENCOUNTER — TELEPHONE (OUTPATIENT)
Dept: HEPATOLOGY | Facility: CLINIC | Age: 79
End: 2024-04-24

## 2024-04-24 ENCOUNTER — OFFICE VISIT (OUTPATIENT)
Dept: HEPATOLOGY | Facility: CLINIC | Age: 79
End: 2024-04-24
Payer: MEDICARE

## 2024-04-24 VITALS
OXYGEN SATURATION: 99 % | DIASTOLIC BLOOD PRESSURE: 66 MMHG | BODY MASS INDEX: 32.31 KG/M2 | HEART RATE: 100 BPM | SYSTOLIC BLOOD PRESSURE: 121 MMHG | HEIGHT: 68 IN | WEIGHT: 213.19 LBS

## 2024-04-24 DIAGNOSIS — N17.9 AKI (ACUTE KIDNEY INJURY): Primary | ICD-10-CM

## 2024-04-24 DIAGNOSIS — E11.65 TYPE 2 DIABETES MELLITUS WITH HYPERGLYCEMIA, WITHOUT LONG-TERM CURRENT USE OF INSULIN: Primary | ICD-10-CM

## 2024-04-24 DIAGNOSIS — K76.82 HEPATIC ENCEPHALOPATHY: ICD-10-CM

## 2024-04-24 DIAGNOSIS — R07.9 CHEST PAIN: ICD-10-CM

## 2024-04-24 DIAGNOSIS — I48.19 PERSISTENT ATRIAL FIBRILLATION: ICD-10-CM

## 2024-04-24 DIAGNOSIS — K70.31 ALCOHOLIC CIRRHOSIS OF LIVER WITH ASCITES: ICD-10-CM

## 2024-04-24 DIAGNOSIS — E87.5 HYPERKALEMIA: ICD-10-CM

## 2024-04-24 DIAGNOSIS — E66.01 SEVERE OBESITY (BMI 35.0-39.9) WITH COMORBIDITY: ICD-10-CM

## 2024-04-24 PROBLEM — E66.811 CLASS 1 OBESITY DUE TO EXCESS CALORIES WITH SERIOUS COMORBIDITY IN ADULT: Status: ACTIVE | Noted: 2023-10-10

## 2024-04-24 PROBLEM — E66.09 CLASS 1 OBESITY DUE TO EXCESS CALORIES WITH SERIOUS COMORBIDITY IN ADULT: Status: ACTIVE | Noted: 2023-10-10

## 2024-04-24 LAB
ALBUMIN SERPL BCP-MCNC: 3.4 G/DL (ref 3.5–5.2)
ALP SERPL-CCNC: 104 U/L (ref 55–135)
ALT SERPL W/O P-5'-P-CCNC: 24 U/L (ref 10–44)
AMMONIA PLAS-SCNC: 90 UMOL/L (ref 10–50)
ANION GAP SERPL CALC-SCNC: 11 MMOL/L (ref 8–16)
ANION GAP SERPL CALC-SCNC: 12 MMOL/L (ref 8–16)
APTT PPP: 38.2 SEC (ref 21–32)
AST SERPL-CCNC: 31 U/L (ref 10–40)
BACTERIA #/AREA URNS AUTO: ABNORMAL /HPF
BASOPHILS # BLD AUTO: 0.05 K/UL (ref 0–0.2)
BASOPHILS NFR BLD: 0.7 % (ref 0–1.9)
BILIRUB SERPL-MCNC: 1.2 MG/DL (ref 0.1–1)
BILIRUB UR QL STRIP: NEGATIVE
BUN SERPL-MCNC: 58 MG/DL (ref 8–23)
BUN SERPL-MCNC: 63 MG/DL (ref 8–23)
CALCIUM SERPL-MCNC: 9.3 MG/DL (ref 8.7–10.5)
CALCIUM SERPL-MCNC: 9.5 MG/DL (ref 8.7–10.5)
CHLORIDE SERPL-SCNC: 100 MMOL/L (ref 95–110)
CHLORIDE SERPL-SCNC: 104 MMOL/L (ref 95–110)
CLARITY UR REFRACT.AUTO: CLEAR
CO2 SERPL-SCNC: 15 MMOL/L (ref 23–29)
CO2 SERPL-SCNC: 18 MMOL/L (ref 23–29)
COLOR UR AUTO: YELLOW
CREAT SERPL-MCNC: 2.8 MG/DL (ref 0.5–1.4)
CREAT SERPL-MCNC: 3 MG/DL (ref 0.5–1.4)
DIFFERENTIAL METHOD BLD: ABNORMAL
EOSINOPHIL # BLD AUTO: 0.1 K/UL (ref 0–0.5)
EOSINOPHIL NFR BLD: 0.7 % (ref 0–8)
ERYTHROCYTE [DISTWIDTH] IN BLOOD BY AUTOMATED COUNT: 13.6 % (ref 11.5–14.5)
EST. GFR  (NO RACE VARIABLE): 20.6 ML/MIN/1.73 M^2
EST. GFR  (NO RACE VARIABLE): 22.4 ML/MIN/1.73 M^2
FERRITIN SERPL-MCNC: 144 NG/ML (ref 20–300)
FIBRINOGEN PPP-MCNC: 421 MG/DL (ref 182–400)
GLUCOSE SERPL-MCNC: 154 MG/DL (ref 70–110)
GLUCOSE SERPL-MCNC: 311 MG/DL (ref 70–110)
GLUCOSE UR QL STRIP: ABNORMAL
HCT VFR BLD AUTO: 44.7 % (ref 40–54)
HGB BLD-MCNC: 15.4 G/DL (ref 14–18)
HGB UR QL STRIP: NEGATIVE
HYALINE CASTS UR QL AUTO: 4 /LPF
IMM GRANULOCYTES # BLD AUTO: 0.05 K/UL (ref 0–0.04)
IMM GRANULOCYTES NFR BLD AUTO: 0.7 % (ref 0–0.5)
INR PPP: 1.3 (ref 0.8–1.2)
IRON SERPL-MCNC: 57 UG/DL (ref 45–160)
KETONES UR QL STRIP: NEGATIVE
LEUKOCYTE ESTERASE UR QL STRIP: NEGATIVE
LYMPHOCYTES # BLD AUTO: 1.1 K/UL (ref 1–4.8)
LYMPHOCYTES NFR BLD: 13.9 % (ref 18–48)
MCH RBC QN AUTO: 34.1 PG (ref 27–31)
MCHC RBC AUTO-ENTMCNC: 34.5 G/DL (ref 32–36)
MCV RBC AUTO: 99 FL (ref 82–98)
MICROSCOPIC COMMENT: ABNORMAL
MONOCYTES # BLD AUTO: 0.9 K/UL (ref 0.3–1)
MONOCYTES NFR BLD: 12 % (ref 4–15)
NEUTROPHILS # BLD AUTO: 5.5 K/UL (ref 1.8–7.7)
NEUTROPHILS NFR BLD: 72 % (ref 38–73)
NITRITE UR QL STRIP: NEGATIVE
NRBC BLD-RTO: 0 /100 WBC
PH UR STRIP: 5 [PH] (ref 5–8)
PLATELET # BLD AUTO: 113 K/UL (ref 150–450)
PMV BLD AUTO: 10.9 FL (ref 9.2–12.9)
POCT GLUCOSE: 177 MG/DL (ref 70–110)
POCT GLUCOSE: 319 MG/DL (ref 70–110)
POTASSIUM SERPL-SCNC: 4.5 MMOL/L (ref 3.5–5.1)
POTASSIUM SERPL-SCNC: 5.5 MMOL/L (ref 3.5–5.1)
PROT SERPL-MCNC: 9.1 G/DL (ref 6–8.4)
PROT UR QL STRIP: NEGATIVE
PROTHROMBIN TIME: 14 SEC (ref 9–12.5)
RBC # BLD AUTO: 4.51 M/UL (ref 4.6–6.2)
RBC #/AREA URNS AUTO: 4 /HPF (ref 0–4)
SARS-COV-2 RDRP RESP QL NAA+PROBE: NEGATIVE
SATURATED IRON: 13 % (ref 20–50)
SODIUM SERPL-SCNC: 129 MMOL/L (ref 136–145)
SODIUM SERPL-SCNC: 131 MMOL/L (ref 136–145)
SP GR UR STRIP: 1.01 (ref 1–1.03)
TOTAL IRON BINDING CAPACITY: 425 UG/DL (ref 250–450)
TRANSFERRIN SERPL-MCNC: 287 MG/DL (ref 200–375)
URN SPEC COLLECT METH UR: ABNORMAL
WBC # BLD AUTO: 7.65 K/UL (ref 3.9–12.7)
WBC #/AREA URNS AUTO: 1 /HPF (ref 0–5)
YEAST UR QL AUTO: ABNORMAL

## 2024-04-24 PROCEDURE — 25000003 PHARM REV CODE 250: Performed by: STUDENT IN AN ORGANIZED HEALTH CARE EDUCATION/TRAINING PROGRAM

## 2024-04-24 PROCEDURE — 99999 PR PBB SHADOW E&M-EST. PATIENT-LVL II: CPT | Mod: PBBFAC,,, | Performed by: INTERNAL MEDICINE

## 2024-04-24 PROCEDURE — 85384 FIBRINOGEN ACTIVITY: CPT | Performed by: STUDENT IN AN ORGANIZED HEALTH CARE EDUCATION/TRAINING PROGRAM

## 2024-04-24 PROCEDURE — 96372 THER/PROPH/DIAG INJ SC/IM: CPT | Performed by: STUDENT IN AN ORGANIZED HEALTH CARE EDUCATION/TRAINING PROGRAM

## 2024-04-24 PROCEDURE — 25000242 PHARM REV CODE 250 ALT 637 W/ HCPCS: Performed by: STUDENT IN AN ORGANIZED HEALTH CARE EDUCATION/TRAINING PROGRAM

## 2024-04-24 PROCEDURE — 82728 ASSAY OF FERRITIN: CPT | Performed by: STUDENT IN AN ORGANIZED HEALTH CARE EDUCATION/TRAINING PROGRAM

## 2024-04-24 PROCEDURE — 36415 COLL VENOUS BLD VENIPUNCTURE: CPT | Performed by: STUDENT IN AN ORGANIZED HEALTH CARE EDUCATION/TRAINING PROGRAM

## 2024-04-24 PROCEDURE — 85730 THROMBOPLASTIN TIME PARTIAL: CPT | Performed by: STUDENT IN AN ORGANIZED HEALTH CARE EDUCATION/TRAINING PROGRAM

## 2024-04-24 PROCEDURE — 93010 ELECTROCARDIOGRAM REPORT: CPT | Mod: ,,, | Performed by: INTERNAL MEDICINE

## 2024-04-24 PROCEDURE — 80053 COMPREHEN METABOLIC PANEL: CPT | Performed by: STUDENT IN AN ORGANIZED HEALTH CARE EDUCATION/TRAINING PROGRAM

## 2024-04-24 PROCEDURE — 99215 OFFICE O/P EST HI 40 MIN: CPT | Mod: S$GLB,,, | Performed by: INTERNAL MEDICINE

## 2024-04-24 PROCEDURE — 85610 PROTHROMBIN TIME: CPT | Performed by: STUDENT IN AN ORGANIZED HEALTH CARE EDUCATION/TRAINING PROGRAM

## 2024-04-24 PROCEDURE — G2211 COMPLEX E/M VISIT ADD ON: HCPCS | Mod: S$GLB,,, | Performed by: INTERNAL MEDICINE

## 2024-04-24 PROCEDURE — 20600001 HC STEP DOWN PRIVATE ROOM

## 2024-04-24 PROCEDURE — 25000003 PHARM REV CODE 250: Mod: JZ,JG | Performed by: STUDENT IN AN ORGANIZED HEALTH CARE EDUCATION/TRAINING PROGRAM

## 2024-04-24 PROCEDURE — 1101F PT FALLS ASSESS-DOCD LE1/YR: CPT | Mod: CPTII,S$GLB,, | Performed by: INTERNAL MEDICINE

## 2024-04-24 PROCEDURE — 80048 BASIC METABOLIC PNL TOTAL CA: CPT | Mod: XB | Performed by: STUDENT IN AN ORGANIZED HEALTH CARE EDUCATION/TRAINING PROGRAM

## 2024-04-24 PROCEDURE — G0379 DIRECT REFER HOSPITAL OBSERV: HCPCS

## 2024-04-24 PROCEDURE — 87040 BLOOD CULTURE FOR BACTERIA: CPT | Performed by: STUDENT IN AN ORGANIZED HEALTH CARE EDUCATION/TRAINING PROGRAM

## 2024-04-24 PROCEDURE — 3078F DIAST BP <80 MM HG: CPT | Mod: CPTII,S$GLB,, | Performed by: INTERNAL MEDICINE

## 2024-04-24 PROCEDURE — 3074F SYST BP LT 130 MM HG: CPT | Mod: CPTII,S$GLB,, | Performed by: INTERNAL MEDICINE

## 2024-04-24 PROCEDURE — U0002 COVID-19 LAB TEST NON-CDC: HCPCS | Performed by: STUDENT IN AN ORGANIZED HEALTH CARE EDUCATION/TRAINING PROGRAM

## 2024-04-24 PROCEDURE — 3288F FALL RISK ASSESSMENT DOCD: CPT | Mod: CPTII,S$GLB,, | Performed by: INTERNAL MEDICINE

## 2024-04-24 PROCEDURE — 93005 ELECTROCARDIOGRAM TRACING: CPT

## 2024-04-24 PROCEDURE — G0378 HOSPITAL OBSERVATION PER HR: HCPCS

## 2024-04-24 PROCEDURE — 1126F AMNT PAIN NOTED NONE PRSNT: CPT | Mod: CPTII,S$GLB,, | Performed by: INTERNAL MEDICINE

## 2024-04-24 PROCEDURE — 83540 ASSAY OF IRON: CPT | Performed by: STUDENT IN AN ORGANIZED HEALTH CARE EDUCATION/TRAINING PROGRAM

## 2024-04-24 PROCEDURE — 81001 URINALYSIS AUTO W/SCOPE: CPT | Performed by: STUDENT IN AN ORGANIZED HEALTH CARE EDUCATION/TRAINING PROGRAM

## 2024-04-24 PROCEDURE — 63600175 PHARM REV CODE 636 W HCPCS: Performed by: STUDENT IN AN ORGANIZED HEALTH CARE EDUCATION/TRAINING PROGRAM

## 2024-04-24 PROCEDURE — 82140 ASSAY OF AMMONIA: CPT | Performed by: STUDENT IN AN ORGANIZED HEALTH CARE EDUCATION/TRAINING PROGRAM

## 2024-04-24 PROCEDURE — 94640 AIRWAY INHALATION TREATMENT: CPT

## 2024-04-24 PROCEDURE — 85025 COMPLETE CBC W/AUTO DIFF WBC: CPT | Performed by: STUDENT IN AN ORGANIZED HEALTH CARE EDUCATION/TRAINING PROGRAM

## 2024-04-24 RX ORDER — SODIUM CHLORIDE 0.9 % (FLUSH) 0.9 %
10 SYRINGE (ML) INJECTION
Status: DISCONTINUED | OUTPATIENT
Start: 2024-04-24 | End: 2024-04-25 | Stop reason: HOSPADM

## 2024-04-24 RX ORDER — INSULIN ASPART 100 [IU]/ML
0-5 INJECTION, SOLUTION INTRAVENOUS; SUBCUTANEOUS EVERY 6 HOURS PRN
Status: DISCONTINUED | OUTPATIENT
Start: 2024-04-24 | End: 2024-04-25 | Stop reason: HOSPADM

## 2024-04-24 RX ORDER — DABIGATRAN ETEXILATE 75 MG/1
75 CAPSULE ORAL 2 TIMES DAILY
Status: DISCONTINUED | OUTPATIENT
Start: 2024-04-24 | End: 2024-04-25 | Stop reason: HOSPADM

## 2024-04-24 RX ORDER — GLUCAGON 1 MG
1 KIT INJECTION
Status: DISCONTINUED | OUTPATIENT
Start: 2024-04-24 | End: 2024-04-25 | Stop reason: HOSPADM

## 2024-04-24 RX ORDER — NALOXONE HCL 0.4 MG/ML
0.02 VIAL (ML) INJECTION
Status: DISCONTINUED | OUTPATIENT
Start: 2024-04-24 | End: 2024-04-25 | Stop reason: HOSPADM

## 2024-04-24 RX ORDER — ALBUTEROL SULFATE 90 UG/1
2 AEROSOL, METERED RESPIRATORY (INHALATION) EVERY 4 HOURS PRN
Status: DISCONTINUED | OUTPATIENT
Start: 2024-04-24 | End: 2024-04-25 | Stop reason: HOSPADM

## 2024-04-24 RX ORDER — ALBUTEROL SULFATE 2.5 MG/.5ML
10 SOLUTION RESPIRATORY (INHALATION) ONCE
Status: COMPLETED | OUTPATIENT
Start: 2024-04-24 | End: 2024-04-24

## 2024-04-24 RX ORDER — SPIRONOLACTONE 50 MG/1
50 TABLET, FILM COATED ORAL DAILY
Status: DISCONTINUED | OUTPATIENT
Start: 2024-04-25 | End: 2024-04-24

## 2024-04-24 RX ORDER — FUROSEMIDE 40 MG/1
40 TABLET ORAL 2 TIMES DAILY
Status: DISCONTINUED | OUTPATIENT
Start: 2024-04-24 | End: 2024-04-24

## 2024-04-24 RX ORDER — IBUPROFEN 200 MG
24 TABLET ORAL
Status: DISCONTINUED | OUTPATIENT
Start: 2024-04-24 | End: 2024-04-25 | Stop reason: HOSPADM

## 2024-04-24 RX ORDER — ATORVASTATIN CALCIUM 20 MG/1
20 TABLET, FILM COATED ORAL DAILY
Status: DISCONTINUED | OUTPATIENT
Start: 2024-04-25 | End: 2024-04-25 | Stop reason: HOSPADM

## 2024-04-24 RX ORDER — FLUTICASONE PROPIONATE 110 UG/1
2 AEROSOL, METERED RESPIRATORY (INHALATION) EVERY 12 HOURS
Status: DISCONTINUED | OUTPATIENT
Start: 2024-04-24 | End: 2024-04-25 | Stop reason: HOSPADM

## 2024-04-24 RX ORDER — LACTULOSE 10 G/15ML
10 SOLUTION ORAL 3 TIMES DAILY
Status: DISCONTINUED | OUTPATIENT
Start: 2024-04-25 | End: 2024-04-25 | Stop reason: HOSPADM

## 2024-04-24 RX ORDER — CALCIUM GLUCONATE 20 MG/ML
1 INJECTION, SOLUTION INTRAVENOUS ONCE
Status: COMPLETED | OUTPATIENT
Start: 2024-04-24 | End: 2024-04-24

## 2024-04-24 RX ORDER — ACETAMINOPHEN 325 MG/1
650 TABLET ORAL EVERY 4 HOURS PRN
Status: DISCONTINUED | OUTPATIENT
Start: 2024-04-24 | End: 2024-04-25 | Stop reason: HOSPADM

## 2024-04-24 RX ORDER — IBUPROFEN 200 MG
16 TABLET ORAL
Status: DISCONTINUED | OUTPATIENT
Start: 2024-04-24 | End: 2024-04-25 | Stop reason: HOSPADM

## 2024-04-24 RX ORDER — FUROSEMIDE 40 MG/1
40 TABLET ORAL EVERY OTHER DAY
Status: DISCONTINUED | OUTPATIENT
Start: 2024-04-25 | End: 2024-04-25 | Stop reason: HOSPADM

## 2024-04-24 RX ORDER — LACTULOSE 10 G/15ML
SOLUTION ORAL 3 TIMES DAILY
Status: CANCELLED | OUTPATIENT
Start: 2024-04-24

## 2024-04-24 RX ORDER — MIDODRINE HYDROCHLORIDE 2.5 MG/1
2.5 TABLET ORAL 3 TIMES DAILY
Status: DISCONTINUED | OUTPATIENT
Start: 2024-04-24 | End: 2024-04-25 | Stop reason: HOSPADM

## 2024-04-24 RX ORDER — METOPROLOL SUCCINATE 50 MG/1
50 TABLET, EXTENDED RELEASE ORAL DAILY
Status: DISCONTINUED | OUTPATIENT
Start: 2024-04-25 | End: 2024-04-25 | Stop reason: HOSPADM

## 2024-04-24 RX ORDER — LEVOTHYROXINE SODIUM 100 UG/1
100 TABLET ORAL DAILY
Status: DISCONTINUED | OUTPATIENT
Start: 2024-04-25 | End: 2024-04-25 | Stop reason: HOSPADM

## 2024-04-24 RX ORDER — PANTOPRAZOLE SODIUM 40 MG/1
40 TABLET, DELAYED RELEASE ORAL DAILY
Status: DISCONTINUED | OUTPATIENT
Start: 2024-04-25 | End: 2024-04-25 | Stop reason: HOSPADM

## 2024-04-24 RX ORDER — FOLIC ACID 1 MG/1
1 TABLET ORAL DAILY
Status: DISCONTINUED | OUTPATIENT
Start: 2024-04-25 | End: 2024-04-25 | Stop reason: HOSPADM

## 2024-04-24 RX ORDER — SPIRONOLACTONE 50 MG/1
100 TABLET, FILM COATED ORAL DAILY
Status: DISCONTINUED | OUTPATIENT
Start: 2024-04-25 | End: 2024-04-24

## 2024-04-24 RX ORDER — LIDOCAINE 50 MG/G
1 PATCH TOPICAL DAILY
Status: DISCONTINUED | OUTPATIENT
Start: 2024-04-25 | End: 2024-04-25 | Stop reason: HOSPADM

## 2024-04-24 RX ORDER — CALCIUM GLUCONATE 20 MG/ML
1 INJECTION, SOLUTION INTRAVENOUS EVERY 10 MIN PRN
Status: DISCONTINUED | OUTPATIENT
Start: 2024-04-24 | End: 2024-04-25 | Stop reason: HOSPADM

## 2024-04-24 RX ADMIN — DABIGATRAN ETEXILATE 75 MG: 75 CAPSULE ORAL at 09:04

## 2024-04-24 RX ADMIN — RIFAXIMIN 550 MG: 550 TABLET ORAL at 09:04

## 2024-04-24 RX ADMIN — CALCIUM GLUCONATE 1 G: 20 INJECTION, SOLUTION INTRAVENOUS at 09:04

## 2024-04-24 RX ADMIN — MIDODRINE HYDROCHLORIDE 2.5 MG: 2.5 TABLET ORAL at 09:04

## 2024-04-24 RX ADMIN — INSULIN ASPART 2 UNITS: 100 INJECTION, SOLUTION INTRAVENOUS; SUBCUTANEOUS at 10:04

## 2024-04-24 RX ADMIN — ALBUTEROL SULFATE 10 MG: 2.5 SOLUTION RESPIRATORY (INHALATION) at 09:04

## 2024-04-24 NOTE — TELEPHONE ENCOUNTER
Patient seen in clinic today 4/24/24 with Dr Escobedo. New Orders to follow.  Transport Patient to the Admit Dept for scheduled admission.    Patient agreed.  Patient transported via wheel chair accompanied by nurse and wife to the Admission Office.  Pt was received by Admission .  Condition satisfactory.

## 2024-04-24 NOTE — PROGRESS NOTES
Subjective:       Patient ID: Philip Kam Sr. is a 78 y.o. male.    Chief Complaint: No chief complaint on file.    HPI  I saw this 78 y.o. with decompensated alcohol related cirrhosis since April 2023. He came to the liver clinic with his wife.  This was a follow up appointment.    Used to drink 3-4 large whiskies per day  Stopped a few months ago with the diagnosis of liver disease    Ascites/edema/trouble breathing  Managed by PCP with labs and paracentesis  - drained x3 - every 6 weeks    On low dose diuretics  Weak and tired.    No recent paracentesis but he has been confused and has asterixis  - has loose bowel motions not on lactulose.  - not onrifaximin    MELD 3.0: 21 at 7/7/2023  3:19 PM  MELD-Na: 19 at 7/7/2023  3:19 PM  Calculated from:  Serum Creatinine: 1.9 mg/dL at 7/7/2023  3:19 PM  Serum Sodium: 134 mmol/L at 7/7/2023  3:19 PM  Total Bilirubin: 2.1 mg/dL at 7/7/2023  3:19 PM  Serum Albumin: 2.9 g/dL at 7/7/2023  3:19 PM  INR(ratio): 1.2 at 7/7/2023  3:19 PM  Age at listing (hypothetical): 77 years  Sex: Male at 7/7/2023  3:19 PM       Abdo US: 1/9/24  Small volume abdominal ascites. Morphologic changes of cirrhosis.     Abdo CT: 3/14/23  1. Cirrhotic diminutive liver with nodular surface contour and moderate to large volume ascites. Mild body wall edema. Small left pleural effusion. Patchy bibasilar ground-glass opacities, pulmonary edema versus small airway process with air trapping/mosaic perfusion.  Mild splenomegaly, 14 cm.  2. No bowel obstruction.  No focal intra-abdominal inflammatory process.  3. Bilateral symmetric renal atrophy/cortical thinning.  No hydronephrosis.  4. Contracted gallbladder with hyperdense lumen, question inspissated sludge.  No discrete gallstone.  No evidence of gallbladder inflammation    EGD: 3/21/23  - Small to medium (< 5 mm) esophageal varices.                          Mainly near GEJ, flatten with insufflation                          - Portal  hypertensive gastropathy.                          - A single duodenal polyp. Resected and retrieved    2 D echo: 1/24/23  The left ventricle is normal in size with concentric remodeling and normal systolic function.  There are segmental left ventricular wall motion abnormalities.  Normal right ventricular size with normal right ventricular systolic function.  Severe left atrial enlargement.  Mild mitral regurgitation.  There is mild-to-moderate aortic valve stenosis.  Aortic valve area is 1.14 cm2; peak velocity is 2.46 m/s; mean gradient is 17 mmHg.  The estimated ejection fraction is 55%.  Normal left ventricular diastolic function.    PMH:  CHF/ A fib- failed cardioversion x2- off eloquis and on pradaxa  Hypertension  DM  Hypothyroidism      Review of Systems   Constitutional:  Negative for activity change, appetite change, chills, fatigue, fever and unexpected weight change.   HENT:  Negative for hearing loss.    Eyes:  Negative for discharge and visual disturbance.   Respiratory:  Negative for cough, chest tightness, shortness of breath and wheezing.    Cardiovascular:  Negative for chest pain, palpitations and leg swelling.   Gastrointestinal:  Negative for abdominal distention, abdominal pain, constipation, diarrhea and nausea.   Genitourinary:  Negative for dysuria and frequency.   Musculoskeletal:  Negative for arthralgias and back pain.   Skin:  Negative for pallor and rash.   Neurological:  Negative for dizziness, tremors, speech difficulty and headaches.   Hematological:  Negative for adenopathy.   Psychiatric/Behavioral:  Negative for agitation and confusion.          Lab Results   Component Value Date    ALT 20 07/07/2023    AST 28 07/07/2023    ALKPHOS 88 07/07/2023    BILITOT 2.1 (H) 07/07/2023     Past Medical History:   Diagnosis Date    Alcoholic cirrhosis of liver with ascites 7/7/2023    Anticoagulant long-term use     Anxiety     CHF (congestive heart failure)     High cholesterol      "Hypertension     Hypothyroidism     Type 2 diabetes mellitus, uncontrolled      Past Surgical History:   Procedure Laterality Date    BACK SURGERY      COLONOSCOPY      COLONOSCOPY N/A 3/21/2023    Procedure: COLONOSCOPY;  Surgeon: Ronnie Hinton MD;  Location: [facility] ENDO;  Service: Endoscopy;  Laterality: N/A;    ESOPHAGOGASTRODUODENOSCOPY N/A 3/21/2023    Procedure: EGD (ESOPHAGOGASTRODUODENOSCOPY);  Surgeon: oRnnie Hinton MD;  Location: [facility] ENDO;  Service: Endoscopy;  Laterality: N/A;    ESOPHAGUS SURGERY  2012    EYE SURGERY      LUMBAR EPIDURAL INJECTION  01/2018    x2    TONSILLECTOMY      TREATMENT OF CARDIAC ARRHYTHMIA N/A 4/8/2021    Procedure: CARDIOVERSION;  Surgeon: Vinayak Eric MD;  Location: [facility] EP LAB;  Service: Cardiology;  Laterality: N/A;  a fib, dccv/hilairo, mac, pr,, sscu    TREATMENT OF CARDIAC ARRHYTHMIA N/A 5/25/2021    Procedure: CARDIOVERSION;  Surgeon: Vinayak Eric MD;  Location: [facility] EP LAB;  Service: Cardiology;  Laterality: N/A;  AF, HILARIO (Cx if compliant), DCCV, MAC, NC, 3Prep     Current Outpatient Medications   Medication Sig Dispense Refill    albuterol (PROVENTIL/VENTOLIN HFA) 90 mcg/actuation inhaler       azelastine (ASTELIN) 137 mcg (0.1 %) nasal spray 1 spray by Nasal route 2 (two) times daily.      b complex vitamins tablet Take 1 tablet by mouth once daily.      dabigatran etexilate (PRADAXA) 150 mg Cap Take 1 capsule (150 mg total) by mouth 2 (two) times a day. "Do NOT break, chew, or open capsules." 60 capsule 11    ferrous sulfate (FEOSOL) Tab tablet Take 1 tablet by mouth daily with breakfast.      fluticasone propionate (FLOVENT DISKUS) 50 mcg/actuation DsDv Inhale into the lungs. Controller      folic acid (FOLVITE) 400 MCG tablet Take 400 mcg by mouth once daily.      furosemide (LASIX) 40 MG tablet Take 1 tablet (40 mg total) by mouth 2 (two) times a day. 60 tablet 0    glipiZIDE (GLUCOTROL) 5 MG tablet Take 5 mg by mouth once daily.      levothyroxine " (SYNTHROID) 100 MCG tablet Take 100 mcg by mouth once daily.       LORazepam (ATIVAN) 0.5 MG tablet Take 0.5 mg by mouth every 6 (six) hours as needed for Anxiety.      metformin (GLUCOPHAGE) 500 MG tablet Take 500 mg by mouth 2 (two) times daily with meals.      metoprolol succinate (TOPROL-XL) 50 MG 24 hr tablet Take 1 tablet (50 mg total) by mouth once daily.      midodrine (PROAMATINE) 2.5 MG Tab Take 2.5 mg by mouth 3 (three) times daily.      pantoprazole (PROTONIX) 40 MG tablet Take 40 mg by mouth once daily.      pantoprazole (PROTONIX) 40 MG tablet Take 40 mg by mouth once daily.      simvastatin (ZOCOR) 40 MG tablet Take 40 mg by mouth every evening.      SITagliptin phosphate (JANUVIA) 50 MG Tab Take 25 mg by mouth once daily.      sodium,potassium,mag sulfates (SUPREP BOWEL PREP KIT) 17.5-3.13-1.6 gram SolR Take 177 mLs by mouth once daily. (Patient not taking: Reported on 10/10/2023) 1 kit 0    spironolactone (ALDACTONE) 100 MG tablet Take 100 mg by mouth.       No current facility-administered medications for this visit.     Facility-Administered Medications Ordered in Other Visits   Medication Dose Route Frequency Provider Last Rate Last Admin    sodium chloride 0.9% bolus 1,000 mL  1,000 mL Intravenous Once Ernestine Ahn, NP           Objective:      Physical Exam  Constitutional:       Comments: Asterixis   HENT:      Head: Normocephalic.   Eyes:      Pupils: Pupils are equal, round, and reactive to light.   Neck:      Thyroid: No thyromegaly.   Cardiovascular:      Rate and Rhythm: Normal rate and regular rhythm.      Heart sounds: Normal heart sounds.   Pulmonary:      Effort: Pulmonary effort is normal.      Breath sounds: Normal breath sounds. No wheezing.   Abdominal:      General: There is no distension.      Palpations: Abdomen is soft. There is no mass.      Tenderness: There is no abdominal tenderness.   Lymphadenopathy:      Cervical: No cervical adenopathy.   Skin:     General: Skin is  warm.      Findings: No erythema or rash.   Neurological:      Mental Status: He is alert and oriented to person, place, and time.   Psychiatric:         Behavior: Behavior normal.           Assessment:       1. Type 2 diabetes mellitus with hyperglycemia, without long-term current use of insulin    2. Severe obesity (BMI 35.0-39.9) with comorbidity    3. Persistent atrial fibrillation    4. Alcoholic cirrhosis of liver with ascites        Plan:   He has mildly decompensated alcohol related liver disease and his main issue has been as ascites managed with infrequent paracentesis and edema managed with low-dose diuretics.      He has recently had some kidney impairment and his diuretics doses were lowered. Despite this, his edema/ascites are well controlled and he has not required a paracentesis for over 6 months.    However, he has been confused over the last few days. He is oriented in clinic but he has asterixis and feels unwell. He has loose bowels despite not being on lactulose.    - given his advanced age and frailty, I arranged for him to be admitted for treatment and optimization of his HE protocol- starting lactulose and rifaximin as well as checking his renal function and sending cultures.    - needs CMP/CBC/INR/blood and urine cultures

## 2024-04-25 ENCOUNTER — TELEPHONE (OUTPATIENT)
Dept: HEPATOLOGY | Facility: CLINIC | Age: 79
End: 2024-04-25
Payer: MEDICARE

## 2024-04-25 VITALS
RESPIRATION RATE: 18 BRPM | OXYGEN SATURATION: 99 % | WEIGHT: 212.94 LBS | HEART RATE: 104 BPM | BODY MASS INDEX: 32.27 KG/M2 | TEMPERATURE: 98 F | SYSTOLIC BLOOD PRESSURE: 111 MMHG | HEIGHT: 68 IN | DIASTOLIC BLOOD PRESSURE: 74 MMHG

## 2024-04-25 PROBLEM — E87.5 HYPERKALEMIA: Status: ACTIVE | Noted: 2024-04-25

## 2024-04-25 PROBLEM — E87.1 HYPONATREMIA: Status: ACTIVE | Noted: 2024-04-25

## 2024-04-25 PROBLEM — N17.9 AKI (ACUTE KIDNEY INJURY): Status: ACTIVE | Noted: 2024-04-25

## 2024-04-25 LAB
ALBUMIN SERPL BCP-MCNC: 3.2 G/DL (ref 3.5–5.2)
ALP SERPL-CCNC: 91 U/L (ref 55–135)
ALT SERPL W/O P-5'-P-CCNC: 20 U/L (ref 10–44)
ANION GAP SERPL CALC-SCNC: 12 MMOL/L (ref 8–16)
AST SERPL-CCNC: 24 U/L (ref 10–40)
BASOPHILS # BLD AUTO: 0.04 K/UL (ref 0–0.2)
BASOPHILS NFR BLD: 0.4 % (ref 0–1.9)
BILIRUB SERPL-MCNC: 1.8 MG/DL (ref 0.1–1)
BUN SERPL-MCNC: 60 MG/DL (ref 8–23)
CALCIUM SERPL-MCNC: 9.6 MG/DL (ref 8.7–10.5)
CHLORIDE SERPL-SCNC: 105 MMOL/L (ref 95–110)
CO2 SERPL-SCNC: 15 MMOL/L (ref 23–29)
CREAT SERPL-MCNC: 2.8 MG/DL (ref 0.5–1.4)
CREAT UR-MCNC: 59 MG/DL (ref 23–375)
DIFFERENTIAL METHOD BLD: ABNORMAL
EOSINOPHIL # BLD AUTO: 0.1 K/UL (ref 0–0.5)
EOSINOPHIL NFR BLD: 0.7 % (ref 0–8)
ERYTHROCYTE [DISTWIDTH] IN BLOOD BY AUTOMATED COUNT: 13.6 % (ref 11.5–14.5)
EST. GFR  (NO RACE VARIABLE): 22.4 ML/MIN/1.73 M^2
ESTIMATED AVG GLUCOSE: 123 MG/DL (ref 68–131)
GLUCOSE SERPL-MCNC: 144 MG/DL (ref 70–110)
HBA1C MFR BLD: 5.9 % (ref 4–5.6)
HCT VFR BLD AUTO: 41.8 % (ref 40–54)
HGB BLD-MCNC: 14.3 G/DL (ref 14–18)
IMM GRANULOCYTES # BLD AUTO: 0.06 K/UL (ref 0–0.04)
IMM GRANULOCYTES NFR BLD AUTO: 0.7 % (ref 0–0.5)
LYMPHOCYTES # BLD AUTO: 1.1 K/UL (ref 1–4.8)
LYMPHOCYTES NFR BLD: 12.3 % (ref 18–48)
MAGNESIUM SERPL-MCNC: 2.1 MG/DL (ref 1.6–2.6)
MCH RBC QN AUTO: 33.8 PG (ref 27–31)
MCHC RBC AUTO-ENTMCNC: 34.2 G/DL (ref 32–36)
MCV RBC AUTO: 99 FL (ref 82–98)
MONOCYTES # BLD AUTO: 1.2 K/UL (ref 0.3–1)
MONOCYTES NFR BLD: 12.7 % (ref 4–15)
NEUTROPHILS # BLD AUTO: 6.7 K/UL (ref 1.8–7.7)
NEUTROPHILS NFR BLD: 73.2 % (ref 38–73)
NRBC BLD-RTO: 0 /100 WBC
OHS QRS DURATION: 80 MS
OHS QTC CALCULATION: 450 MS
PHOSPHATE SERPL-MCNC: 4.2 MG/DL (ref 2.7–4.5)
PLATELET # BLD AUTO: 181 K/UL (ref 150–450)
PMV BLD AUTO: 10.3 FL (ref 9.2–12.9)
POCT GLUCOSE: 120 MG/DL (ref 70–110)
POCT GLUCOSE: 127 MG/DL (ref 70–110)
POCT GLUCOSE: 159 MG/DL (ref 70–110)
POTASSIUM SERPL-SCNC: 5 MMOL/L (ref 3.5–5.1)
PROT SERPL-MCNC: 8.5 G/DL (ref 6–8.4)
RBC # BLD AUTO: 4.23 M/UL (ref 4.6–6.2)
SODIUM SERPL-SCNC: 132 MMOL/L (ref 136–145)
SODIUM UR-SCNC: 85 MMOL/L (ref 20–250)
WBC # BLD AUTO: 9.14 K/UL (ref 3.9–12.7)

## 2024-04-25 PROCEDURE — 96372 THER/PROPH/DIAG INJ SC/IM: CPT | Performed by: STUDENT IN AN ORGANIZED HEALTH CARE EDUCATION/TRAINING PROGRAM

## 2024-04-25 PROCEDURE — 63600175 PHARM REV CODE 636 W HCPCS: Performed by: STUDENT IN AN ORGANIZED HEALTH CARE EDUCATION/TRAINING PROGRAM

## 2024-04-25 PROCEDURE — 85025 COMPLETE CBC W/AUTO DIFF WBC: CPT | Performed by: STUDENT IN AN ORGANIZED HEALTH CARE EDUCATION/TRAINING PROGRAM

## 2024-04-25 PROCEDURE — 25000003 PHARM REV CODE 250: Performed by: STUDENT IN AN ORGANIZED HEALTH CARE EDUCATION/TRAINING PROGRAM

## 2024-04-25 PROCEDURE — 82570 ASSAY OF URINE CREATININE: CPT | Performed by: STUDENT IN AN ORGANIZED HEALTH CARE EDUCATION/TRAINING PROGRAM

## 2024-04-25 PROCEDURE — G0378 HOSPITAL OBSERVATION PER HR: HCPCS

## 2024-04-25 PROCEDURE — 36415 COLL VENOUS BLD VENIPUNCTURE: CPT | Performed by: STUDENT IN AN ORGANIZED HEALTH CARE EDUCATION/TRAINING PROGRAM

## 2024-04-25 PROCEDURE — 84100 ASSAY OF PHOSPHORUS: CPT | Performed by: STUDENT IN AN ORGANIZED HEALTH CARE EDUCATION/TRAINING PROGRAM

## 2024-04-25 PROCEDURE — 83036 HEMOGLOBIN GLYCOSYLATED A1C: CPT | Performed by: INTERNAL MEDICINE

## 2024-04-25 PROCEDURE — 80053 COMPREHEN METABOLIC PANEL: CPT | Performed by: STUDENT IN AN ORGANIZED HEALTH CARE EDUCATION/TRAINING PROGRAM

## 2024-04-25 PROCEDURE — 84300 ASSAY OF URINE SODIUM: CPT | Performed by: STUDENT IN AN ORGANIZED HEALTH CARE EDUCATION/TRAINING PROGRAM

## 2024-04-25 PROCEDURE — 83735 ASSAY OF MAGNESIUM: CPT | Performed by: STUDENT IN AN ORGANIZED HEALTH CARE EDUCATION/TRAINING PROGRAM

## 2024-04-25 RX ORDER — LACTULOSE 10 G/15ML
10 SOLUTION ORAL; RECTAL 3 TIMES DAILY
Qty: 1350 ML | Refills: 0 | Status: SHIPPED | OUTPATIENT
Start: 2024-04-25 | End: 2024-05-25

## 2024-04-25 RX ORDER — INSULIN ASPART 100 [IU]/ML
3 INJECTION, SOLUTION INTRAVENOUS; SUBCUTANEOUS
Status: DISCONTINUED | OUTPATIENT
Start: 2024-04-25 | End: 2024-04-25 | Stop reason: HOSPADM

## 2024-04-25 RX ADMIN — INSULIN ASPART 3 UNITS: 100 INJECTION, SOLUTION INTRAVENOUS; SUBCUTANEOUS at 09:04

## 2024-04-25 RX ADMIN — LACTULOSE 10 G: 20 SOLUTION ORAL at 08:04

## 2024-04-25 RX ADMIN — FOLIC ACID 1 MG: 1 TABLET ORAL at 08:04

## 2024-04-25 RX ADMIN — MIDODRINE HYDROCHLORIDE 2.5 MG: 2.5 TABLET ORAL at 01:04

## 2024-04-25 RX ADMIN — LACTULOSE 10 G: 20 SOLUTION ORAL at 01:04

## 2024-04-25 RX ADMIN — RIFAXIMIN 550 MG: 550 TABLET ORAL at 08:04

## 2024-04-25 RX ADMIN — DABIGATRAN ETEXILATE 75 MG: 75 CAPSULE ORAL at 08:04

## 2024-04-25 RX ADMIN — PANTOPRAZOLE SODIUM 40 MG: 40 TABLET, DELAYED RELEASE ORAL at 08:04

## 2024-04-25 RX ADMIN — MIDODRINE HYDROCHLORIDE 2.5 MG: 2.5 TABLET ORAL at 08:04

## 2024-04-25 RX ADMIN — ATORVASTATIN CALCIUM 20 MG: 20 TABLET, FILM COATED ORAL at 08:04

## 2024-04-25 RX ADMIN — LEVOTHYROXINE SODIUM 100 MCG: 100 TABLET ORAL at 07:04

## 2024-04-25 RX ADMIN — ACETAMINOPHEN 650 MG: 325 TABLET ORAL at 11:04

## 2024-04-25 RX ADMIN — INSULIN DETEMIR 15 UNITS: 100 INJECTION, SOLUTION SUBCUTANEOUS at 03:04

## 2024-04-25 RX ADMIN — FUROSEMIDE 40 MG: 40 TABLET ORAL at 08:04

## 2024-04-25 NOTE — SUBJECTIVE & OBJECTIVE
Past Medical History:   Diagnosis Date    Alcoholic cirrhosis of liver with ascites 7/7/2023    Anticoagulant long-term use     Anxiety     CHF (congestive heart failure)     High cholesterol     Hypertension     Hypothyroidism     Type 2 diabetes mellitus, uncontrolled        Past Surgical History:   Procedure Laterality Date    BACK SURGERY      COLONOSCOPY      COLONOSCOPY N/A 3/21/2023    Procedure: COLONOSCOPY;  Surgeon: Ronnie Hinton MD;  Location: [facility] ENDO;  Service: Endoscopy;  Laterality: N/A;    ESOPHAGOGASTRODUODENOSCOPY N/A 3/21/2023    Procedure: EGD (ESOPHAGOGASTRODUODENOSCOPY);  Surgeon: Ronnie Hinton MD;  Location: [facility] ENDO;  Service: Endoscopy;  Laterality: N/A;    ESOPHAGUS SURGERY  2012    EYE SURGERY      LUMBAR EPIDURAL INJECTION  01/2018    x2    TONSILLECTOMY      TREATMENT OF CARDIAC ARRHYTHMIA N/A 4/8/2021    Procedure: CARDIOVERSION;  Surgeon: Vinayak Eric MD;  Location: [facility] EP LAB;  Service: Cardiology;  Laterality: N/A;  a fib, dccv/hilario, mac, pr,, sscu    TREATMENT OF CARDIAC ARRHYTHMIA N/A 5/25/2021    Procedure: CARDIOVERSION;  Surgeon: Vinayak Eric MD;  Location: [facility] EP LAB;  Service: Cardiology;  Laterality: N/A;  AF, HILARIO (Cx if compliant), DCCV, MAC, CT, 3Prep       Review of patient's allergies indicates:   Allergen Reactions    Codeine Itching     Current Facility-Administered Medications   Medication Dose Route Frequency Provider Last Rate Last Admin    acetaminophen tablet 650 mg  650 mg Oral Q4H PRN Pablo Coates MD   650 mg at 04/25/24 1139    albuterol inhaler 2 puff  2 puff Inhalation Q4H PRN Pablo Coates MD        atorvastatin tablet 20 mg  20 mg Oral Daily Pablo Coates MD   20 mg at 04/25/24 0849    calcium gluconate 1 g in NS IVPB (premixed)  1 g Intravenous Q10 Min PRN Vishal Guillermo MD        dabigatran etexilate capsule 75 mg  75 mg Oral BID Pablo Coates MD   75 mg at 04/25/24 0850    dextrose 10% bolus 125 mL 125 mL  12.5 g Intravenous PRN Pablo Coates  MD        dextrose 10% bolus 250 mL 250 mL  25 g Intravenous PRN Pablo Coates MD        fluticasone propionate 110 mcg/actuation inhaler 2 puff  2 puff Inhalation Q12H Pablo Coates MD        folic acid tablet 1 mg  1 mg Oral Daily Pablo Coates MD   1 mg at 04/25/24 0849    furosemide tablet 40 mg  40 mg Oral Every other day Pablo Coates MD   40 mg at 04/25/24 0850    glucagon (human recombinant) injection 1 mg  1 mg Intramuscular PRN Pablo Coates MD        glucagon (human recombinant) injection 1 mg  1 mg Intramuscular PRN Pablo Coates MD        glucose chewable tablet 16 g  16 g Oral PRN Pablo Coates MD        glucose chewable tablet 24 g  24 g Oral PRN Pablo Coates MD        insulin aspart U-100 pen 0-5 Units  0-5 Units Subcutaneous Q6H PRN Pablo Coates MD   2 Units at 04/24/24 2249    insulin aspart U-100 pen 3 Units  3 Units Subcutaneous TIDWM Pablo Coates MD   3 Units at 04/25/24 0943    insulin detemir U-100 (Levemir) pen 15 Units  15 Units Subcutaneous BID Pablo Coates MD   15 Units at 04/25/24 0312    lactulose 20 gram/30 mL solution Soln 10 g  10 g Oral TID Pablo Coates MD   10 g at 04/25/24 0849    levothyroxine tablet 100 mcg  100 mcg Oral Daily Pablo Coates MD   100 mcg at 04/25/24 0742    LIDOcaine 5 % patch 1 patch  1 patch Transdermal Daily Pablo Coates MD        metoprolol succinate (TOPROL-XL) 24 hr tablet 50 mg  50 mg Oral Daily Pablo Coates MD        midodrine tablet 2.5 mg  2.5 mg Oral TID Pablo Coates MD   2.5 mg at 04/25/24 0849    naloxone 0.4 mg/mL injection 0.02 mg  0.02 mg Intravenous PRN Pablo Coates MD        pantoprazole EC tablet 40 mg  40 mg Oral Daily Pablo Coates MD   40 mg at 04/25/24 0849    rifAXIMin tablet 550 mg  550 mg Oral BID Pablo Coates MD   550 mg at 04/25/24 0849    sodium chloride 0.9% flush 10 mL  10 mL Intravenous PRN Pablo Coates MD         Facility-Administered Medications Ordered in Other Encounters   Medication Dose Route Frequency Provider Last  "Rate Last Admin    sodium chloride 0.9% bolus 1,000 mL  1,000 mL Intravenous Once Ernestine Ahn, ED         Family History       Problem Relation (Age of Onset)    Heart attack Son          Tobacco Use    Smoking status: Never    Smokeless tobacco: Never   Substance and Sexual Activity    Alcohol use: Yes     Alcohol/week: 1.0 standard drink of alcohol     Types: 1 Cans of beer per week     Comment: Everyother day_Pt stated,"I drink a few beers."    Drug use: No    Sexual activity: Not on file     Review of Systems   Constitutional:  Positive for fatigue. Negative for activity change, appetite change and fever.   HENT:  Negative for congestion, facial swelling, postnasal drip, rhinorrhea, sinus pressure and sinus pain.    Respiratory:  Negative for cough, chest tightness and shortness of breath.    Cardiovascular:  Positive for leg swelling. Negative for chest pain.   Gastrointestinal:  Negative for abdominal distention, constipation, diarrhea, nausea and vomiting.   Genitourinary:  Negative for decreased urine volume and difficulty urinating.   Musculoskeletal:  Negative for back pain.   Neurological:  Negative for dizziness, light-headedness and headaches.   Psychiatric/Behavioral:  Negative for agitation, behavioral problems and confusion.      Objective:     Vital Signs (Most Recent):  Temp: 98.1 °F (36.7 °C) (04/25/24 1141)  Pulse: 104 (04/25/24 1141)  Resp: 18 (04/25/24 1141)  BP: 111/74 (04/25/24 1141)  SpO2: 99 % (04/25/24 1141) Vital Signs (24h Range):  Temp:  [97.5 °F (36.4 °C)-98.3 °F (36.8 °C)] 98.1 °F (36.7 °C)  Pulse:  [] 104  Resp:  [18] 18  SpO2:  [99 %-100 %] 99 %  BP: ()/(66-77) 111/74     Weight: 96.6 kg (212 lb 15.4 oz) (04/24/24 1750)  Body mass index is 32.38 kg/m².  Body surface area is 2.15 meters squared.    No intake/output data recorded.     Physical Exam  Vitals and nursing note reviewed.   Constitutional:       General: He is not in acute distress.     Appearance: Normal " appearance. He is not ill-appearing or toxic-appearing.   HENT:      Head: Normocephalic and atraumatic.      Nose: No congestion or rhinorrhea.      Mouth/Throat:      Mouth: Mucous membranes are moist.      Pharynx: No oropharyngeal exudate or posterior oropharyngeal erythema.   Eyes:      General: No scleral icterus.        Right eye: No discharge.         Left eye: No discharge.      Extraocular Movements: Extraocular movements intact.      Conjunctiva/sclera: Conjunctivae normal.   Neck:      Vascular: No JVD.   Cardiovascular:      Rate and Rhythm: Normal rate. Rhythm irregular.      Heart sounds: No murmur heard.     No friction rub. No gallop.   Pulmonary:      Effort: Pulmonary effort is normal. No respiratory distress.      Breath sounds: No wheezing or rales.   Abdominal:      General: There is distension.      Palpations: Abdomen is soft.      Tenderness: There is no abdominal tenderness.   Musculoskeletal:      Cervical back: Normal range of motion and neck supple.      Right lower leg: Edema present.      Left lower leg: Edema present.   Skin:     General: Skin is warm and dry.   Neurological:      General: No focal deficit present.      Mental Status: He is alert and oriented to person, place, and time.   Psychiatric:         Mood and Affect: Mood normal.         Behavior: Behavior normal.         Thought Content: Thought content normal.          Significant Labs:  CBC:   Recent Labs   Lab 04/25/24  0541   WBC 9.14   RBC 4.23*   HGB 14.3   HCT 41.8      MCV 99*   MCH 33.8*   MCHC 34.2     CMP:   Recent Labs   Lab 04/25/24  0541   *   CALCIUM 9.6   ALBUMIN 3.2*   PROT 8.5*   *   K 5.0   CO2 15*      BUN 60*   CREATININE 2.8*   ALKPHOS 91   ALT 20   AST 24   BILITOT 1.8*

## 2024-04-25 NOTE — ASSESSMENT & PLAN NOTE
Stable.  Creatinine slightly worse on admission, will hold furosemide and spironolactone    Tte 1/24/23:  The left ventricle is normal in size with concentric remodeling and normal systolic function.  There are segmental left ventricular wall motion abnormalities.  Normal right ventricular size with normal right ventricular systolic function.  Severe left atrial enlargement.  Mild mitral regurgitation.  There is mild-to-moderate aortic valve stenosis.  Aortic valve area is 1.14 cm2; peak velocity is 2.46 m/s; mean gradient is 17 mmHg.  The estimated ejection fraction is 55%.  Normal left ventricular diastolic function.

## 2024-04-25 NOTE — H&P
[facility] Medicine  History & Physical    Patient Name: Philip Kam Sr.  MRN: [id number]  Patient Class: IP- Inpatient  Admission Date: 4/24/2024  Attending Physician: Live Carlisle MD   Primary Care Provider: Dylon Chase MD         Patient information was obtained from patient, spouse/SO, past medical records, and ER records.     Subjective:     Principal Problem:Hepatic encephalopathy    Chief Complaint: No chief complaint on file.       HPI: Patient is a 78-year-old male with past medical history significant for hypothyroidism, persistent atrial fibrillation, chronic diastolic heart failure, diabetes hypertension, alcohol-related cirrhosis who presents as a clinic admit for new onset hepatic encephalopathy.  Patient previously was a heavy hard alcohol drinker, was diagnosed with cirrhosis several months ago which prompted him stopping.  He is continued to deal with the ascites, edema, and trouble breathing.  Has been on furosemide 40/spironolactone 100 and has not required a paracentesis in 6 months per the last hepatology note.  His PCP has been closely following his labs.  His meld-NA is 19 today.  He does not take any lactulose rifaximin or branch chain amino acids. The patient has been having progressive confusion over the last several days and has been generally feeling unwell.  When he presented to hepatology Clinic, he was oriented however he demonstrated asterixis suggestive of hepatic encephalopathy.  His hepatologist recommended admission for ongoing treatment of suspected HE with rifaximin and lactulose.  Will additionally evaluate the patient for potential exacerbating factors.    Past Medical History:   Diagnosis Date    Alcoholic cirrhosis of liver with ascites 7/7/2023    Anticoagulant long-term use     Anxiety     CHF (congestive heart failure)     High cholesterol     Hypertension     Hypothyroidism     Type 2 diabetes mellitus, uncontrolled         Past Surgical History:   Procedure Laterality Date    BACK SURGERY      COLONOSCOPY      COLONOSCOPY N/A 3/21/2023    Procedure: COLONOSCOPY;  Surgeon: Ronnie Hinton MD;  Location: [facility] ENDO;  Service: Endoscopy;  Laterality: N/A;    ESOPHAGOGASTRODUODENOSCOPY N/A 3/21/2023    Procedure: EGD (ESOPHAGOGASTRODUODENOSCOPY);  Surgeon: Ronnie Hinton MD;  Location: [facility] ENDO;  Service: Endoscopy;  Laterality: N/A;    ESOPHAGUS SURGERY  2012    EYE SURGERY      LUMBAR EPIDURAL INJECTION  01/2018    x2    TONSILLECTOMY      TREATMENT OF CARDIAC ARRHYTHMIA N/A 4/8/2021    Procedure: CARDIOVERSION;  Surgeon: Vinayak Eric MD;  Location: [facility] EP LAB;  Service: Cardiology;  Laterality: N/A;  a fib, dccv/hilario, mac, pr,, sscu    TREATMENT OF CARDIAC ARRHYTHMIA N/A 5/25/2021    Procedure: CARDIOVERSION;  Surgeon: Vinayak Eric MD;  Location: [facility] EP LAB;  Service: Cardiology;  Laterality: N/A;  AF, HILARIO (Cx if compliant), DCCV, MAC, [place], 3Prep       Review of patient's allergies indicates:   Allergen Reactions    Codeine Itching       Current Facility-Administered Medications   Medication Dose Route Frequency Provider Last Rate Last Admin    acetaminophen tablet 650 mg  650 mg Oral Q4H PRN Pablo Coates MD        albuterol inhaler 2 puff  2 puff Inhalation Q4H PRN Pablo Coates MD        albuterol sulfate nebulizer solution 10 mg  10 mg Nebulization Once Vishal Guillermo MD        [START ON 4/25/2024] atorvastatin tablet 20 mg  20 mg Oral Daily Pablo Coates MD        calcium gluconate 1 g in NS IVPB (premixed)  1 g Intravenous Once Vishal Guillermo MD        And    calcium gluconate 1 g in NS IVPB (premixed)  1 g Intravenous Q10 Min PRN Vishal Guillermo MD        dabigatran etexilate capsule 75 mg  75 mg Oral BID Pablo Coates MD        dextrose 10% bolus 125 mL 125 mL  12.5 g Intravenous PRN Pablo Coates MD        dextrose 10% bolus 250 mL 250 mL  25 g Intravenous PRN Pablo Coates MD        fluticasone propionate 110  "mcg/actuation inhaler 2 puff  2 puff Inhalation Q12H Pablo Coates MD        [START ON 4/25/2024] folic acid tablet 1 mg  1 mg Oral Daily Pablo Coates MD        [START ON 4/25/2024] furosemide tablet 40 mg  40 mg Oral Every other day Pablo Coates MD        glucagon (human recombinant) injection 1 mg  1 mg Intramuscular PRN Pablo Coates MD        glucagon (human recombinant) injection 1 mg  1 mg Intramuscular PRN Pablo Coates MD        glucose chewable tablet 16 g  16 g Oral PRN Pablo Coates MD        glucose chewable tablet 24 g  24 g Oral PRN Pablo Coates MD        insulin aspart U-100 pen 0-5 Units  0-5 Units Subcutaneous Q6H PRN Pablo Coates MD        [START ON 4/25/2024] levothyroxine tablet 100 mcg  100 mcg Oral Daily Pablo Coates MD        [START ON 4/25/2024] LIDOcaine 5 % patch 1 patch  1 patch Transdermal Daily Pablo Coates MD        [START ON 4/25/2024] metoprolol succinate (TOPROL-XL) 24 hr tablet 50 mg  50 mg Oral Daily Pablo Coates MD        midodrine tablet 2.5 mg  2.5 mg Oral TID Pablo Coates MD        naloxone 0.4 mg/mL injection 0.02 mg  0.02 mg Intravenous PRN Pablo Coates MD        [START ON 4/25/2024] pantoprazole EC tablet 40 mg  40 mg Oral Daily Pablo Coates MD        sodium chloride 0.9% flush 10 mL  10 mL Intravenous PRN Pablo Coates MD        [START ON 4/25/2024] spironolactone tablet 50 mg  50 mg Oral Daily Pablo Coates MD         Facility-Administered Medications Ordered in Other Encounters   Medication Dose Route Frequency Provider Last Rate Last Admin    sodium chloride 0.9% bolus 1,000 mL  1,000 mL Intravenous Once Ernestine Ahn NP         Family History       Problem Relation (Age of Onset)    Heart attack Son          Tobacco Use    Smoking status: Never    Smokeless tobacco: Never   Substance and Sexual Activity    Alcohol use: Yes     Alcohol/week: 1.0 standard drink of alcohol     Types: 1 Cans of beer per week     Comment: Everyother day_Pt stated,"I drink a few " "beers."    Drug use: No    Sexual activity: Not on file     Review of Systems   Constitutional:  Positive for activity change and fatigue. Negative for chills and fever.   HENT:  Negative for sinus pressure and sinus pain.    Eyes:  Negative for visual disturbance.   Respiratory:  Negative for cough, chest tightness and shortness of breath.    Cardiovascular:  Negative for chest pain, palpitations and leg swelling.   Gastrointestinal:  Negative for abdominal distention, abdominal pain, constipation, diarrhea, nausea and vomiting.   Musculoskeletal:  Negative for back pain.   Skin:  Negative for rash.   Neurological:  Positive for tremors and weakness. Negative for light-headedness and headaches.   Hematological:  Bruises/bleeds easily.   Psychiatric/Behavioral:  Positive for confusion and decreased concentration.      Objective:     Vital Signs (Most Recent):  Temp: 97.7 °F (36.5 °C) (04/1945)  Pulse: 79 (04/1945)  Resp: 18 (04/1945)  BP: 97/67 (04/1945)  SpO2: 100 % (04/1945) Vital Signs (24h Range):  Temp:  [97.5 °F (36.4 °C)-97.7 °F (36.5 °C)] 97.7 °F (36.5 °C)  Pulse:  [] 79  Resp:  [18] 18  SpO2:  [99 %-100 %] 100 %  BP: ()/(66-77) 97/67     Weight: 96.6 kg (212 lb 15.4 oz)  Body mass index is 32.38 kg/m².     Physical Exam  Vitals and nursing note reviewed.   Constitutional:       Appearance: Normal appearance.   HENT:      Head: Normocephalic and atraumatic.   Eyes:      General: No scleral icterus.     Pupils: Pupils are equal, round, and reactive to light.   Cardiovascular:      Rate and Rhythm: Normal rate and regular rhythm.      Pulses: Normal pulses.      Heart sounds: Normal heart sounds.   Pulmonary:      Effort: Pulmonary effort is normal.      Breath sounds: Normal breath sounds.   Abdominal:      General: Abdomen is flat. There is no distension.      Palpations: Abdomen is soft.      Tenderness: There is no abdominal tenderness.   Musculoskeletal:      " Right lower leg: No edema.      Left lower leg: No edema.   Lymphadenopathy:      Cervical: No cervical adenopathy.   Skin:     General: Skin is warm and dry.      Capillary Refill: Capillary refill takes less than 2 seconds.      Comments: Mild asterixis   Neurological:      General: No focal deficit present.      Mental Status: He is alert and oriented to person, place, and time.              CRANIAL NERVES     CN III, IV, VI   Pupils are equal, round, and reactive to light.       Significant Labs: All pertinent labs within the past 24 hours have been reviewed.    Significant Imaging: I have reviewed all pertinent imaging results/findings within the past 24 hours.  Assessment/Plan:     * Hepatic encephalopathy  Patient is a 78-year-old male with history of alcoholic cirrhosis and persistent AFib who is presenting with new onset hepatic encephalopathy.  He presents as a clinic admit for initiation of lactulose and rifaximin.  He was seen by Dr. Lara in clinic today and noted to have new onset hepatic encephalopathy.  Denies any abdominal pain.  Has been well compensated previously and has not required a paracentesis for 6 months now.  He was being managed almost exclusively by his family medicine doctor but recently started seeing a hepatologist as his disease became more complicated  .  Plan:  - lactulose and rifaximin  - delirium precautions  - no antibiotics at this time    Cirrhosis  Patient with known Cirrhosis with Child's class B. Co-morbidities are present and inclusive of ascites, hepatic encephalopathy, malnutrition, and thrombocytopenia .  MELD-Na score calculated; MELD 3.0: 26 at 4/24/2024  7:05 PM  MELD-Na: 25 at 4/24/2024  7:05 PM  Calculated from:  Serum Creatinine: 3.0 mg/dL at 4/24/2024  7:05 PM  Serum Sodium: 131 mmol/L at 4/24/2024  7:05 PM  Total Bilirubin: 1.2 mg/dL at 4/24/2024  7:05 PM  Serum Albumin: 3.4 g/dL at 4/24/2024  7:05 PM  INR(ratio): 1.3 at 4/24/2024  7:05 PM  Age at listing  (hypothetical): 78 years  Sex: Male at 4/24/2024  7:05 PM      Continue chronic meds. Etiology likely ETOH. Will avoid any hepatotoxic meds, and monitor CBC/CMP/INR for synthetic function.     Class 1 obesity due to excess calories with serious comorbidity in adult  Body mass index is 32.38 kg/m². Morbid obesity complicates all aspects of disease management from diagnostic modalities to treatment. Weight loss encouraged and health benefits explained to patient.         Permanent atrial fibrillation  Patient with Persistent (7 days or more) atrial fibrillation which is controlled currently with Beta Blocker. Patient is currently in atrial fibrillation.[id number] Score: 3. HASBLED Score: 6. Anticoagulation indicated. Anticoagulation done with dabigatran .    Plan:  - Rate control with target HR <110  - Metoprolol   - HAS-BLED Score:  - Hypertension (+1)  Yes  - Impaired Renal Function (Dialysis, transplant, Cr >2.26 mg/dL) (+1)  Yes  - Impaired Liver Function (Cirrhosis or bilirubin >2x normal with AST/ALT/AP >3x normal) (+1)  Yes  - History of Stroke (+1)  No  - History of Bleeding (+1)  No  - Labile INRs (Unstable/high INRs, time in therapeutic range <60%) (+1)  No  - >65 years (+1)  Yes  - Drugs (Antiplatelet agents, NSAIDs) (+1)  Yes  - Alcohol Consumption (+1)  Yes    - Total Score:  6  - Risk of bleeding: >10%  - 2g MgSO4 IV  - Magnesium > 2, Potassium > 4  - Continuous telemetry        Type 2 diabetes mellitus with hyperglycemia, without long-term current use of insulin  Patient's FSGs are controlled on current medication regimen.  Last A1c reviewed-   Lab Results   Component Value Date    HGBA1C 6.1 (H) 03/22/2022     Most recent fingerstick glucose reviewed-   Recent Labs   Lab 04/24/24  1809   POCTGLUCOSE 177*     Current correctional scale  Low  Maintain anti-hyperglycemic dose as follows-   Antihyperglycemics (From admission, onward)      Start     Stop Route Frequency Ordered    04/24/24 1941  insulin  aspart U-100 pen 0-5 Units         -- SubQ Every 6 hours PRN 04/24/24 1842          Hold Oral hypoglycemics while patient is in the hospital.        Acute on chronic diastolic heart failure  Stable.  Creatinine slightly worse on admission, will hold furosemide and spironolactone    Tte 1/24/23:  The left ventricle is normal in size with concentric remodeling and normal systolic function.  There are segmental left ventricular wall motion abnormalities.  Normal right ventricular size with normal right ventricular systolic function.  Severe left atrial enlargement.  Mild mitral regurgitation.  There is mild-to-moderate aortic valve stenosis.  Aortic valve area is 1.14 cm2; peak velocity is 2.46 m/s; mean gradient is 17 mmHg.  The estimated ejection fraction is 55%.  Normal left ventricular diastolic function.      Essential hypertension  Chronic, controlled. Latest blood pressure and vitals reviewed-     Temp:  [97.5 °F (36.4 °C)-97.7 °F (36.5 °C)]   Pulse:  []   Resp:  [18]   BP: ()/(66-77)   SpO2:  [99 %-100 %] .   Home meds for hypertension were reviewed and noted below.   Hypertension Medications               furosemide (LASIX) 40 MG tablet Take 1 tablet (40 mg total) by mouth 2 (two) times a day.    metoprolol succinate (TOPROL-XL) 50 MG 24 hr tablet Take 1 tablet (50 mg total) by mouth once daily.    spironolactone (ALDACTONE) 100 MG tablet Take 100 mg by mouth.            While in the hospital, will manage blood pressure as follows; Continue home antihypertensive regimen    Will utilize p.r.n. blood pressure medication only if patient's blood pressure greater than 180/110 and he develops symptoms such as worsening chest pain or shortness of breath.    Mixed hyperlipidemia  Atorvastatin 20        VTE Risk Mitigation (From admission, onward)           Ordered     dabigatran etexilate capsule 75 mg  2 times daily         04/24/24 1842     Reason for No Pharmacological VTE Prophylaxis  Once         Question:  Reasons:  Answer:  Already adequately anticoagulated on oral Anticoagulants    04/24/24 1842     IP VTE HIGH RISK PATIENT  Once         04/24/24 1842     Place sequential compression device  Until discontinued         04/24/24 1842                                    Pablo Coates MD  Department of Hospital Medicine  [facility]

## 2024-04-25 NOTE — PLAN OF CARE
Follow up with Hepatology Clinic - 1st Fl  [facility] called to schedule Hepatology appt, Raven/rep sent message to clinic to call the patient to schedule appt. [address]  [facility] [id number] [phone]

## 2024-04-25 NOTE — PLAN OF CARE
[facility] - Transplant Stepdown  Discharge Final Note    Primary Care Provider: Dylon Chase MD    Expected Discharge Date: 4/25/2024    Final Discharge Note (most recent)       Final Note - 04/25/24 1447          Final Note    Assessment Type Final Discharge Note     Anticipated Discharge Disposition Home or Self Care     Hospital Resources/Appts/Education Provided Provided patient/caregiver with written discharge plan information   This information was provided by the patient's bedside nurse.       Post-Acute Status    Post-Acute Authorization Other     Other Status No Post-Acute Service Needs                     Important Message from Medicare      The patient was discharged with no stated d/c needs.        Contact Info       Dylon Chase MD   Specialty: Family Medicine   Relationship: PCP - General    [address]  [address]  [place] [address]   Phone: [phone]       Next Steps: Follow up in 3 day(s)    Dylon Chase MD   Specialty: Family Medicine   Relationship: PCP - General    [address]  [address]  [place] [address]   Phone: [phone]       Next Steps: Follow up on 5/1/2024    Instructions: @9:15 am    Hepatology Clinic - [facility]   Specialty: Hepatology    [address] [address]   Phone: [phone]       Next Steps: Follow up    Instructions: CHW called to schedule Hepatology appt, Raven/rep sent message to clinic to call the patient to schedule appt.          Rigoberto Meeks LMSW  Case Management [facility]

## 2024-04-25 NOTE — HPI
Philip Kam is a 77 yo male with hx of DM2, AFib, HFpEF, mild to moderate AS, ETOH cirrhosis, and CKD III 3b (Baseline SCR ~ 2.0) who presents to the hospital from hepatology clinic on 4/24 for treatment of suspected hepatic encephalopathy.  He does not take lactulose at home, ammonia found to be 90 on admit and he was started on lactulose and rifaximin.  Other labs revealed an YARITZA with SCR of 3.0, BUN of 63, Na 131, K 5.5, bicarb of 15.  Home medications consisted spironolactone (dose recently decreased to 50 mg) and lasix 40 mg QOD which he reports compliance with.  Appetite has been fair but he has had good oral intake of fluids.  His diuretics were held on admission with slight improvement in SCR with a reduction in SCr to 2.8 and Na improved to 132.  His mental status has returned to baseline.  He reports good UOP this morning, no problems with urination.  Urine lytes and renal US have been ordered and pending.  Neprhology has been consulted on 4/25 for evaluation of YARITZA on CKD

## 2024-04-25 NOTE — PLAN OF CARE
Plan of care reviewed with the patient at the beginning of the shift. Pt admitted with hepatic encephalopathy. Pt remains aaox4 but conversation or expressive thoughts can sometimes be inappropriate to conversation. Rifaximin started. Pt will have lactulose today. K 5.5. calcium gluconate and inhalation treatment given. Repeat K 4.5. Tele monitoring initiated. Pt in rate controlled afib. Fall precautions maintained. He is up independently with stand by assist. Pt remained free from falls and injury this shift. Bed locked in lowest position, side rails up x2, call light within reach. Instructed pt to call for assistance as needed. Pt verbalized understanding. Vitals stable. Pt afebrile overnight. No acute issues overnight. Will continue to monitor.

## 2024-04-25 NOTE — TELEPHONE ENCOUNTER
----- Message from Raven Cruz sent at 4/25/2024  2:18 PM CDT -----  Regarding: Scheduling Request  Contact: [email or web address]   Scheduling Request           Appt Type:  Ep     Date/Time Preference:any     Treating Provider:Gregg     Caller Name:[email or web address]@ [email or web address]      Contact Preference:[phone] (home)       Comments/notes:[email or web address]@ [email or web address]  is calling to schedule patient for f/u after hospital inpatient. Requesting a call back

## 2024-04-25 NOTE — HPI
Patient is a 78-year-old male with past medical history significant for hypothyroidism, persistent atrial fibrillation, chronic diastolic heart failure, diabetes hypertension, alcohol-related cirrhosis who presents as a clinic admit for new onset hepatic encephalopathy.  Patient previously was a heavy hard alcohol drinker, was diagnosed with cirrhosis several months ago which prompted him stopping.  He is continued to deal with the ascites, edema, and trouble breathing.  Has been on furosemide 40/spironolactone 100 and has not required a paracentesis in 6 months per the last hepatology note.  His PCP has been closely following his labs.  His meld-NA is 19 today.  He does not take any lactulose rifaximin or branch chain amino acids. The patient has been having progressive confusion over the last several days and has been generally feeling unwell.  When he presented to hepatology Clinic, he was oriented however he demonstrated asterixis suggestive of hepatic encephalopathy.  His hepatologist recommended admission for ongoing treatment of suspected HE with rifaximin and lactulose.  Will additionally evaluate the patient for potential exacerbating factors.

## 2024-04-25 NOTE — ASSESSMENT & PLAN NOTE
Patient's FSGs are controlled on current medication regimen.  Last A1c reviewed-   Lab Results   Component Value Date    HGBA1C 6.1 (H) 03/22/2022     Most recent fingerstick glucose reviewed-   Recent Labs   Lab 04/24/24  1809   POCTGLUCOSE 177*     Current correctional scale  Low  Maintain anti-hyperglycemic dose as follows-   Antihyperglycemics (From admission, onward)      Start     Stop Route Frequency Ordered    04/24/24 1941  insulin aspart U-100 pen 0-5 Units         -- SubQ Every 6 hours PRN 04/24/24 1842          Hold Oral hypoglycemics while patient is in the hospital.

## 2024-04-25 NOTE — PROGRESS NOTES
US called and pt and wife wantig to leave from there.  Pt already had dc info, and wife brought all belongings w her to US.

## 2024-04-25 NOTE — ASSESSMENT & PLAN NOTE
Patient with known Cirrhosis with Child's class B. Co-morbidities are present and inclusive of ascites, hepatic encephalopathy, malnutrition, and thrombocytopenia .  MELD-Na score calculated; MELD 3.0: 26 at 4/24/2024  7:05 PM  MELD-Na: 25 at 4/24/2024  7:05 PM  Calculated from:  Serum Creatinine: 3.0 mg/dL at 4/24/2024  7:05 PM  Serum Sodium: 131 mmol/L at 4/24/2024  7:05 PM  Total Bilirubin: 1.2 mg/dL at 4/24/2024  7:05 PM  Serum Albumin: 3.4 g/dL at 4/24/2024  7:05 PM  INR(ratio): 1.3 at 4/24/2024  7:05 PM  Age at listing (hypothetical): 78 years  Sex: Male at 4/24/2024  7:05 PM      Continue chronic meds. Etiology likely ETOH. Will avoid any hepatotoxic meds, and monitor CBC/CMP/INR for synthetic function.

## 2024-04-25 NOTE — ASSESSMENT & PLAN NOTE
Chronic, controlled. Latest blood pressure and vitals reviewed-     Temp:  [97.5 °F (36.4 °C)-97.7 °F (36.5 °C)]   Pulse:  []   Resp:  [18]   BP: ()/(66-77)   SpO2:  [99 %-100 %] .   Home meds for hypertension were reviewed and noted below.   Hypertension Medications               furosemide (LASIX) 40 MG tablet Take 1 tablet (40 mg total) by mouth 2 (two) times a day.    metoprolol succinate (TOPROL-XL) 50 MG 24 hr tablet Take 1 tablet (50 mg total) by mouth once daily.    spironolactone (ALDACTONE) 100 MG tablet Take 100 mg by mouth.            While in the hospital, will manage blood pressure as follows; Continue home antihypertensive regimen    Will utilize p.r.n. blood pressure medication only if patient's blood pressure greater than 180/110 and he develops symptoms such as worsening chest pain or shortness of breath.

## 2024-04-25 NOTE — ASSESSMENT & PLAN NOTE
Body mass index is 32.38 kg/m². Morbid obesity complicates all aspects of disease management from diagnostic modalities to treatment. Weight loss encouraged and health benefits explained to patient.

## 2024-04-25 NOTE — PROGRESS NOTES
Pt and wife given dc paperwork.  Verbalized understanding, all questions answered.  Tele and piv dc'd.  Pt to get ordered US prior to setting up transport.

## 2024-04-25 NOTE — CONSULTS
[facility] - Transplant Stepdown  Nephrology  Consult Note    Patient Name: Philip Kam Sr.  MRN: [id number]  Admission Date: 4/24/2024  Hospital Length of Stay: 1 days  Attending Provider: Doreen Du MD   Primary Care Physician: Dylon Chase MD  Principal Problem:Hepatic encephalopathy    Inpatient consult to Nephrology  Consult performed by: Rowdy Disla NP  Consult ordered by: Doreen Du MD  Reason for consult: YARITZA on CKD 3b      Subjective:     HPI: Philip Kam is a 79 yo male with hx of DM2, AFib, HFpEF, mild to moderate AS, ETOH cirrhosis, and CKD III 3b (Baseline SCR ~ 2.0) who presents to the hospital from hepatology clinic on 4/24 for treatment of suspected hepatic encephalopathy.  He does not take lactulose at home, ammonia found to be 90 on admit and he was started on lactulose and rifaximin.  Other labs revealed an YARITZA with SCR of 3.0, BUN of 63, Na 131, K 5.5, bicarb of 15.  Home medications consisted spironolactone (dose recently decreased to 50 mg) and lasix 40 mg QOD which he reports compliance with.  Appetite has been fair but he has had good oral intake of fluids.  His diuretics were held on admission with slight improvement in SCR with a reduction in SCr to 2.8 and Na improved to 132.  His mental status has returned to baseline.  He reports good UOP this morning, no problems with urination.  Urine lytes and renal US have been ordered and pending.  Neprhology has been consulted on 4/25 for evaluation of YARITZA on CKD       Past Medical History:   Diagnosis Date    Alcoholic cirrhosis of liver with ascites 7/7/2023    Anticoagulant long-term use     Anxiety     CHF (congestive heart failure)     High cholesterol     Hypertension     Hypothyroidism     Type 2 diabetes mellitus, uncontrolled        Past Surgical History:   Procedure Laterality Date    BACK SURGERY      COLONOSCOPY      COLONOSCOPY N/A 3/21/2023    Procedure: COLONOSCOPY;  Surgeon: Ronnie Hinton MD;   Location: [facility] ENDO;  Service: Endoscopy;  Laterality: N/A;    ESOPHAGOGASTRODUODENOSCOPY N/A 3/21/2023    Procedure: EGD (ESOPHAGOGASTRODUODENOSCOPY);  Surgeon: Ronnie Hinton MD;  Location: [facility] ENDO;  Service: Endoscopy;  Laterality: N/A;    ESOPHAGUS SURGERY  2012    EYE SURGERY      LUMBAR EPIDURAL INJECTION  01/2018    x2    TONSILLECTOMY      TREATMENT OF CARDIAC ARRHYTHMIA N/A 4/8/2021    Procedure: CARDIOVERSION;  Surgeon: Vinayak Eric MD;  Location: [facility] EP LAB;  Service: Cardiology;  Laterality: N/A;  a fib, dccv/hilario, mac, pr,, sscu    TREATMENT OF CARDIAC ARRHYTHMIA N/A 5/25/2021    Procedure: CARDIOVERSION;  Surgeon: Vinayak Eric MD;  Location: [facility] EP LAB;  Service: Cardiology;  Laterality: N/A;  AF, HILARIO (Cx if compliant), DCCV, MAC, CT, 3Prep       Review of patient's allergies indicates:   Allergen Reactions    Codeine Itching     Current Facility-Administered Medications   Medication Dose Route Frequency Provider Last Rate Last Admin    acetaminophen tablet 650 mg  650 mg Oral Q4H PRN Pablo Coates MD   650 mg at 04/25/24 1139    albuterol inhaler 2 puff  2 puff Inhalation Q4H PRN Pablo Coates MD        atorvastatin tablet 20 mg  20 mg Oral Daily Pablo Coates MD   20 mg at 04/25/24 0849    calcium gluconate 1 g in NS IVPB (premixed)  1 g Intravenous Q10 Min PRN Vishal Guillermo MD        dabigatran etexilate capsule 75 mg  75 mg Oral BID Pablo Coates MD   75 mg at 04/25/24 0850    dextrose 10% bolus 125 mL 125 mL  12.5 g Intravenous PRN Pablo Coates MD        dextrose 10% bolus 250 mL 250 mL  25 g Intravenous PRN Pablo Coates MD        fluticasone propionate 110 mcg/actuation inhaler 2 puff  2 puff Inhalation Q12H Pablo Coates MD        folic acid tablet 1 mg  1 mg Oral Daily Pablo Coates MD   1 mg at 04/25/24 0849    furosemide tablet 40 mg  40 mg Oral Every other day Pablo Coates MD   40 mg at 04/25/24 0850    glucagon (human recombinant) injection 1 mg  1 mg  Intramuscular PRN Pablo Coates MD        glucagon (human recombinant) injection 1 mg  1 mg Intramuscular PRN Pablo Coates MD        glucose chewable tablet 16 g  16 g Oral PRN Pablo Coates MD        glucose chewable tablet 24 g  24 g Oral PRN Pablo Coates MD        insulin aspart U-100 pen 0-5 Units  0-5 Units Subcutaneous Q6H PRN Pablo Coates MD   2 Units at 04/24/24 2249    insulin aspart U-100 pen 3 Units  3 Units Subcutaneous TIDWM Pablo Coates MD   3 Units at 04/25/24 0943    insulin detemir U-100 (Levemir) pen 15 Units  15 Units Subcutaneous BID Pablo Coates MD   15 Units at 04/25/24 0312    lactulose 20 gram/30 mL solution Soln 10 g  10 g Oral TID Pablo Coates MD   10 g at 04/25/24 0849    levothyroxine tablet 100 mcg  100 mcg Oral Daily Pablo Coates MD   100 mcg at 04/25/24 0742    LIDOcaine 5 % patch 1 patch  1 patch Transdermal Daily Pablo Coates MD        metoprolol succinate (TOPROL-XL) 24 hr tablet 50 mg  50 mg Oral Daily Pablo Coates MD        midodrine tablet 2.5 mg  2.5 mg Oral TID Pablo Coates MD   2.5 mg at 04/25/24 0849    naloxone 0.4 mg/mL injection 0.02 mg  0.02 mg Intravenous PRN Pablo Coates MD        pantoprazole EC tablet 40 mg  40 mg Oral Daily Pablo Coates MD   40 mg at 04/25/24 0849    rifAXIMin tablet 550 mg  550 mg Oral BID Pablo Coates MD   550 mg at 04/25/24 0849    sodium chloride 0.9% flush 10 mL  10 mL Intravenous PRN Pablo Coates MD         Facility-Administered Medications Ordered in Other Encounters   Medication Dose Route Frequency Provider Last Rate Last Admin    sodium chloride 0.9% bolus 1,000 mL  1,000 mL Intravenous Once Ernestine Ahn NP         Family History       Problem Relation (Age of Onset)    Heart attack Son          Tobacco Use    Smoking status: Never    Smokeless tobacco: Never   Substance and Sexual Activity    Alcohol use: Yes     Alcohol/week: 1.0 standard drink of alcohol     Types: 1 Cans of beer per week     Comment:  "Everyother day_Pt stated,"I drink a few beers."    Drug use: No    Sexual activity: Not on file     Review of Systems   Constitutional:  Positive for fatigue. Negative for activity change, appetite change and fever.   HENT:  Negative for congestion, facial swelling, postnasal drip, rhinorrhea, sinus pressure and sinus pain.    Respiratory:  Negative for cough, chest tightness and shortness of breath.    Cardiovascular:  Positive for leg swelling. Negative for chest pain.   Gastrointestinal:  Negative for abdominal distention, constipation, diarrhea, nausea and vomiting.   Genitourinary:  Negative for decreased urine volume and difficulty urinating.   Musculoskeletal:  Negative for back pain.   Neurological:  Negative for dizziness, light-headedness and headaches.   Psychiatric/Behavioral:  Negative for agitation, behavioral problems and confusion.      Objective:     Vital Signs (Most Recent):  Temp: 98.1 °F (36.7 °C) (04/25/24 1141)  Pulse: 104 (04/25/24 1141)  Resp: 18 (04/25/24 1141)  BP: 111/74 (04/25/24 1141)  SpO2: 99 % (04/25/24 1141) Vital Signs (24h Range):  Temp:  [97.5 °F (36.4 °C)-98.3 °F (36.8 °C)] 98.1 °F (36.7 °C)  Pulse:  [] 104  Resp:  [18] 18  SpO2:  [99 %-100 %] 99 %  BP: ()/(66-77) 111/74     Weight: 96.6 kg (212 lb 15.4 oz) (04/24/24 1750)  Body mass index is 32.38 kg/m².  Body surface area is 2.15 meters squared.    No intake/output data recorded.     Physical Exam  Vitals and nursing note reviewed.   Constitutional:       General: He is not in acute distress.     Appearance: Normal appearance. He is not ill-appearing or toxic-appearing.   HENT:      Head: Normocephalic and atraumatic.      Nose: No congestion or rhinorrhea.      Mouth/Throat:      Mouth: Mucous membranes are moist.      Pharynx: No oropharyngeal exudate or posterior oropharyngeal erythema.   Eyes:      General: No scleral icterus.        Right eye: No discharge.         Left eye: No discharge.      Extraocular " Movements: Extraocular movements intact.      Conjunctiva/sclera: Conjunctivae normal.   Neck:      Vascular: No JVD.   Cardiovascular:      Rate and Rhythm: Normal rate. Rhythm irregular.      Heart sounds: No murmur heard.     No friction rub. No gallop.   Pulmonary:      Effort: Pulmonary effort is normal. No respiratory distress.      Breath sounds: No wheezing or rales.   Abdominal:      General: There is distension.      Palpations: Abdomen is soft.      Tenderness: There is no abdominal tenderness.   Musculoskeletal:      Cervical back: Normal range of motion and neck supple.      Right lower leg: Edema present.      Left lower leg: Edema present.   Skin:     General: Skin is warm and dry.   Neurological:      General: No focal deficit present.      Mental Status: He is alert and oriented to person, place, and time.   Psychiatric:         Mood and Affect: Mood normal.         Behavior: Behavior normal.         Thought Content: Thought content normal.          Significant Labs:  CBC:   Recent Labs   Lab 04/25/24  0541   WBC 9.14   RBC 4.23*   HGB 14.3   HCT 41.8      MCV 99*   MCH 33.8*   MCHC 34.2     CMP:   Recent Labs   Lab 04/25/24  0541   *   CALCIUM 9.6   ALBUMIN 3.2*   PROT 8.5*   *   K 5.0   CO2 15*      BUN 60*   CREATININE 2.8*   ALKPHOS 91   ALT 20   AST 24   BILITOT 1.8*       Assessment/Plan:     Renal/  Hyperkalemia  Improved  -2/2 spironolactone  -recommend holding for now; low K diet    YARITZA on CKD 3b  Non-Oliguric YARITZA on CKD 3b  Baseline SCR appears to be ~ 2.0; likely from chronic liver disease.  Does have DM @ baseline although no proteinuria on UA.  He presents from hepatology clinic with supsected hepatic encephalopathy and admitted to hospital medicine for management.  Presented with YARITZA with SCR of 3.0.  His diuretics were held with improvement to 2.8.  Na Low but stable.  Nephrology consulted for YARITZA on CKD 3b on 4/25.    Suspect YARITZA is related to some degree  of volume depletion.  He appears on the dry side on examination.  Collapsible IJ's on POCUS.      Plan/Recommendations  -Renal US  -urine lytes  -recommend to hold diuretics  -check VBG; can start oral bicarbonate tabs with acidemia is present  -recommend LR @ 100 cc/hr x 10 hours  -recommend albumin 25 gm   -continue strict I/O's  -RFP BID  -will f/u    Endocrine  Hyponatremia  Appears of the dry side  -will check urine lytes/UOSM  -recommend to hold diuretics  -recommend LR @ 100 cc/hr x 10 hours  -recommend albumin 25gm  -BID monitoring    GI  * Hepatic encephalopathy  -Improved  -Ammonia 90 on admit, started on lactulose/Rifaximin      Rowdy Pineda, NP  Nephrology  [facility] - Transplant Stepdown

## 2024-04-25 NOTE — DISCHARGE SUMMARY
[facility] - Transplant [facility] Medicine  Discharge Summary      Patient Name: Philip Kam Sr.  MRN: [id number]  JOE: [id number]  Patient Class: OP- Observation  Admission Date: 4/24/2024  Hospital Length of Stay: 1 days  Discharge Date and Time:  04/25/2024 2:34 PM  Attending Physician: Doreen Du MD   Discharging Provider: Doreen Du MD  Primary Care Provider: Dylon Chase MD  [facility] Medicine Team: [facility] MED  Doreen Du MD  Primary Care Team: [facility]    HPI:   Patient is a 78-year-old male with past medical history significant for hypothyroidism, persistent atrial fibrillation, chronic diastolic heart failure, diabetes hypertension, alcohol-related cirrhosis who presents as a clinic admit for new onset hepatic encephalopathy.  Patient previously was a heavy hard alcohol drinker, was diagnosed with cirrhosis several months ago which prompted him stopping.  He is continued to deal with the ascites, edema, and trouble breathing.  Has been on furosemide 40/spironolactone 100 and has not required a paracentesis in 6 months per the last hepatology note.  His PCP has been closely following his labs.  His meld-NA is 19 today.  He does not take any lactulose rifaximin or branch chain amino acids. The patient has been having progressive confusion over the last several days and has been generally feeling unwell.  When he presented to hepatology Clinic, he was oriented however he demonstrated asterixis suggestive of hepatic encephalopathy.  His hepatologist recommended admission for ongoing treatment of suspected HE with rifaximin and lactulose.  Will additionally evaluate the patient for potential exacerbating factors.    * No surgery found *      Hospital Course:   Patient with  history of alcoholic cirrhosis, was sent in from hepatology clinic for concerns for HE. Patient started on lactulose and rifaximin. Mentation improved and currently patient is awake, alert and oriented x  4. Patient reports that he feels at baseline and staying in the hospital makes him irritable so he would like to go home today. He understands his blood cx are still in process and if they result positive later patient may need to return to ER. Discussed with hepatology who are okay with d/c and will assist making a close OP follow up appointment for patient. Prescribed lactulose and rifaximin. Goal 3 or more BM per day. FU with PCP in 1 week. Patient remained stable on floors and is getting d/c home. Patient verbalized understanding. All questions were answered.          Goals of Care Treatment Preferences:  Code Status: Full Code      Consults:   Consults (From admission, onward)          Status Ordering Provider     Inpatient consult to Nephrology  Once        Provider:  (Not yet assigned)    Acknowledged RIVAS, SEEMAL     Inpatient consult to Hepatology  Once        Provider:  (Not yet assigned)    Acknowledged RIVAS, SEEMAL     Inpatient consult to Midline team  Once        Provider:  (Not yet assigned)    Completed COLLIN CROWELL            No new Assessment & Plan notes have been filed under this hospital service since the last note was generated.  Service: Hospital Medicine    Final Active Diagnoses:    Diagnosis Date Noted POA    PRINCIPAL PROBLEM:  Hepatic encephalopathy [K76.82] 04/24/2024 Yes    YARITZA on CKD 3b [N17.9] 04/25/2024 Unknown    Hyperkalemia [E87.5] 04/25/2024 Unknown    Hyponatremia [E87.1] 04/25/2024 Unknown    Cirrhosis [K74.60] 10/10/2023 Yes    Class 1 obesity due to excess calories with serious comorbidity in adult [E66.09] 10/10/2023 Yes    Permanent atrial fibrillation [I48.21] 05/25/2021 Yes    Acute on chronic diastolic heart failure [I50.33] 03/09/2018 Yes    Essential hypertension [I10] 03/09/2018 Yes    Mixed hyperlipidemia [E78.2] 03/09/2018 Yes    Type 2 diabetes mellitus with hyperglycemia, without long-term current use of insulin [E11.65]  Yes      Problems Resolved During  this Admission:       Discharged Condition: stable    Disposition: Home or Self Care    Follow Up:   Follow-up Information       Dylon Chase MD Follow up in 3 day(s).    Specialty: Family Medicine  Contact information:  [address]  [address]  [place]  [phone]               Dylon Chase MD Follow up on 5/1/2024.    Specialty: Family Medicine  Why: @9:15 am  Contact information:  [address]  [address]  [place] [address]  [phone]               Hepatology Clinic - [facility] Follow up.    Specialty: Hepatology  Why: CHW called to schedule Hepatology appt, Raven/rep sent message to clinic to call the patient to schedule appt.  Contact information:  Rhianna Malik  [facility] [address] [phone]  Additional information:  [facility] - Main Building, 1st floor near Clinic elevator   Please park in [facility] and walk through Clinic elevator hallway                         Patient Instructions:   No discharge procedures on file.    Significant Diagnostic Studies: N/A    Pending Diagnostic Studies:       Procedure Component Value Units Date/Time    Bile acids, cholylglycine [[id number]]     Order Status: Sent Lab Status: No result     Specimen: Blood     Creatinine, Random Urine [[id number]] Collected: 04/25/24 1342    Order Status: Sent Lab Status: In process Updated: 04/25/24 1359    Specimen: Urine, Clean Catch     Plasma Renin Concentration [[id number]]     Order Status: Sent Lab Status: No result     Specimen: Blood     Sodium, Random Urine [[id number]] Collected: 04/25/24 1342    Order Status: Sent Lab Status: In process Updated: 04/25/24 1359    Specimen: Urine, Clean Catch     US Retroperitoneal Complete [[id number]]     Order Status: Sent Lab Status: No result            Medications:  Reconciled Home Medications:      Medication List        START taking these medications      lactulose 10 gram/15 mL solution  Commonly known  "as: CHRONULAC  Take 15 mLs (10 g total) by mouth 3 (three) times daily.     rifAXIMin 550 mg Tab  Commonly known as: XIFAXAN  Take 1 tablet (550 mg total) by mouth 2 (two) times daily.            CONTINUE taking these medications      albuterol 90 mcg/actuation inhaler  Commonly known as: PROVENTIL/VENTOLIN HFA     azelastine 137 mcg (0.1 %) nasal spray  Commonly known as: ASTELIN  1 spray by Nasal route 2 (two) times daily.     b complex vitamins tablet  Take 1 tablet by mouth once daily.     dabigatran etexilate 150 mg Cap  Commonly known as: PRADAXA  Take 1 capsule (150 mg total) by mouth 2 (two) times a day. "Do NOT break, chew, or open capsules."     ferrous sulfate Tab tablet  Commonly known as: FEOSOL  Take 1 tablet by mouth daily with breakfast.     fluticasone propionate 50 mcg/actuation Dsdv  Commonly known as: FLOVENT DISKUS  Inhale into the lungs. Controller     folic acid 400 MCG tablet  Commonly known as: FOLVITE  Take 400 mcg by mouth once daily.     furosemide 40 MG tablet  Commonly known as: LASIX  Take 1 tablet (40 mg total) by mouth 2 (two) times a day.     glipiZIDE 5 MG tablet  Commonly known as: GLUCOTROL  Take 5 mg by mouth once daily.     levothyroxine 100 MCG tablet  Commonly known as: SYNTHROID  Take 100 mcg by mouth once daily.     LORazepam 0.5 MG tablet  Commonly known as: ATIVAN  Take 0.5 mg by mouth every 6 (six) hours as needed for Anxiety.     metoprolol succinate 50 MG 24 hr tablet  Commonly known as: TOPROL-XL  Take 1 tablet (50 mg total) by mouth once daily.     midodrine 2.5 MG Tab  Commonly known as: PROAMATINE  Take 2.5 mg by mouth 3 (three) times daily.     pantoprazole 40 MG tablet  Commonly known as: PROTONIX  Take 40 mg by mouth once daily.     simvastatin 40 MG tablet  Commonly known as: ZOCOR  Take 40 mg by mouth every evening.     SITagliptin phosphate 50 MG Tab  Commonly known as: JANUVIA  Take 25 mg by mouth once daily.     spironolactone 100 MG tablet  Commonly known " as: ALDACTONE  Take 100 mg by mouth.              Indwelling Lines/Drains at time of discharge:   Lines/Drains/Airways       None                   Time spent on the discharge of patient: 35 minutes         Doreen Du MD  Department of Hospital Medicine  [facility] - Transplant Stepdown

## 2024-04-25 NOTE — ASSESSMENT & PLAN NOTE
Patient with Persistent (7 days or more) atrial fibrillation which is controlled currently with Beta Blocker. Patient is currently in atrial fibrillation.[id number] Score: 3. HASBLED Score: 6. Anticoagulation indicated. Anticoagulation done with dabigatran .    Plan:  - Rate control with target HR <110  - Metoprolol   - HAS-BLED Score:  - Hypertension (+1)  Yes  - Impaired Renal Function (Dialysis, transplant, Cr >2.26 mg/dL) (+1)  Yes  - Impaired Liver Function (Cirrhosis or bilirubin >2x normal with AST/ALT/AP >3x normal) (+1)  Yes  - History of Stroke (+1)  No  - History of Bleeding (+1)  No  - Labile INRs (Unstable/high INRs, time in therapeutic range <60%) (+1)  No  - >65 years (+1)  Yes  - Drugs (Antiplatelet agents, NSAIDs) (+1)  Yes  - Alcohol Consumption (+1)  Yes    - Total Score:  6  - Risk of bleeding: >10%  - 2g MgSO4 IV  - Magnesium > 2, Potassium > 4  - Continuous telemetry

## 2024-04-25 NOTE — PLAN OF CARE
[facility] - Transplant Stepdown  Initial Discharge Assessment       Primary Care Provider: Dylon Chase MD    Admission Diagnosis: Hepatic encephalopathy [K76.82]    Admission Date: 4/24/2024  Expected Discharge Date: 4/25/2024    Transition of Care Barriers: None    Payor: [employer] [facility] / Plan: [employer] CHOICES / Product Type: Medicare Advantage /     Extended Emergency Contact Information  Primary Emergency Contact: Merna Kam  Address: [address] [place] [address] [facility]  Home Phone: [phone]  Mobile Phone: [phone]  Relation: Spouse    Discharge Plan A: Home  Discharge Plan B: Home with family      [facility] [place] - [address]  [address]  [address]  [place] [address]  Phone: [phone] Fax: [phone]      Initial Assessment (most recent)       Adult Discharge Assessment - 04/25/24 1352          Discharge Assessment    Assessment Type Discharge Planning Assessment     Confirmed/corrected address, phone number and insurance Yes     Confirmed Demographics Correct on Facesheet     Source of Information family;patient     Communicated AMRY with patient/caregiver Date not available/Unable to determine     Reason For Admission Hepatic encephalopathy     People in Home spouse     Do you expect to return to your current living situation? Yes     Do you have help at home or someone to help you manage your care at home? Yes     Who are your caregiver(s) and their phone number(s)? Merna Kam ( Wife) [phone]     Prior to hospitilization cognitive status: Alert/Oriented     Current cognitive status: Alert/Oriented     Walking or Climbing Stairs Difficulty yes     Walking or Climbing Stairs ambulation difficulty, requires equipment     Dressing/Bathing Difficulty no     Equipment Currently Used at Home cane, quad   The patient's wife reported that the patient uses a cane sometimes.    Patient currently being  followed by outpatient case management? No     Do you currently have service(s) that help you manage your care at home? No     Do you take prescription medications? Yes     Do you have prescription coverage? Yes     [facility] [facility] CHOICES     Is the patient taking medications as prescribed? yes     Who is going to help you get home at discharge? Merna Kam     Are you on dialysis? No     Do you take coumadin? No     Discharge Plan A Home     Discharge Plan B Home with family     DME Needed Upon Discharge  none     Discharge Plan discussed with: Patient;Spouse/sig other     Transition of Care Barriers None        Physical Activity    On average, how many days per week do you engage in moderate to strenuous exercise (like a brisk walk)? Patient declined     On average, how many minutes do you engage in exercise at this level? Patient declined        Financial Resource Strain    How hard is it for you to pay for the very basics like food, housing, medical care, and heating? Patient declined        Housing Stability    In the last 12 months, was there a time when you were not able to pay the mortgage or rent on time? Patient declined     At any time in the past 12 months, were you homeless or living in a shelter (including now)? Patient declined        Transportation Needs    In the past 12 months, has lack of transportation kept you from medical appointments or from getting medications? Patient declined     In the past 12 months, has lack of transportation kept you from meetings, work, or from getting things needed for daily living? Patient declined        Food Insecurity    Within the past 12 months, you worried that your food would run out before you got the money to buy more. Patient declined     Within the past 12 months, the food you bought just didn't last and you didn't have money to get more. Patient declined        Stress    Do you feel stress - tense, restless,  nervous, or anxious, or unable to sleep at night because your mind is troubled all the time - these days? Patient declined        Social Connections    In a typical week, how many times do you talk on the phone with family, friends, or neighbors? Patient declined     How often do you get together with friends or relatives? Patient declined     How often do you attend Orthodox or Synagogue services? Patient declined     Do you belong to any clubs or organizations such as Orthodox groups, unions, fraternal or athletic groups, or school groups? Patient declined     How often do you attend meetings of the clubs or organizations you belong to? Patient declined     Are you , , , , never , or living with a partner? Patient declined        Alcohol Use    Q1: How often do you have a drink containing alcohol? Patient declined     Q2: How many drinks containing alcohol do you have on a typical day when you are drinking? Patient declined     Q3: How often do you have six or more drinks on one occasion? Patient declined                   The SW met with the patient and his wife at bedside to complete the patient's DPA. The patient uses a cane sometimes. The patient is not on Coumadin or Dialysis. The patient's wife will provide assistance / help upon discharge. The patient's wife will provide transportation upon discharge.     Discharge Plan A and Plan B have been determined by review of patient's clinical status, future medical and therapeutic needs, and coverage/benefits for post-acute care in coordination with multidisciplinary team members.     Rigoberto Meeks LMSW  Case Management [facility]

## 2024-04-25 NOTE — SUBJECTIVE & OBJECTIVE
Past Medical History:   Diagnosis Date    Alcoholic cirrhosis of liver with ascites 7/7/2023    Anticoagulant long-term use     Anxiety     CHF (congestive heart failure)     High cholesterol     Hypertension     Hypothyroidism     Type 2 diabetes mellitus, uncontrolled        Past Surgical History:   Procedure Laterality Date    BACK SURGERY      COLONOSCOPY      COLONOSCOPY N/A 3/21/2023    Procedure: COLONOSCOPY;  Surgeon: Ronnie Hinton MD;  Location: [facility] ENDO;  Service: Endoscopy;  Laterality: N/A;    ESOPHAGOGASTRODUODENOSCOPY N/A 3/21/2023    Procedure: EGD (ESOPHAGOGASTRODUODENOSCOPY);  Surgeon: Ronnie Hinton MD;  Location: [facility] ENDO;  Service: Endoscopy;  Laterality: N/A;    ESOPHAGUS SURGERY  2012    EYE SURGERY      LUMBAR EPIDURAL INJECTION  01/2018    x2    TONSILLECTOMY      TREATMENT OF CARDIAC ARRHYTHMIA N/A 4/8/2021    Procedure: CARDIOVERSION;  Surgeon: Vinayak Eric MD;  Location: [facility] EP LAB;  Service: Cardiology;  Laterality: N/A;  a fib, dccv/hilario, mac, pr,, sscu    TREATMENT OF CARDIAC ARRHYTHMIA N/A 5/25/2021    Procedure: CARDIOVERSION;  Surgeon: Vinayak Eric MD;  Location: [facility] EP LAB;  Service: Cardiology;  Laterality: N/A;  AF, HILARIO (Cx if compliant), DCCV, MAC, AK, 3Prep       Review of patient's allergies indicates:   Allergen Reactions    Codeine Itching       Current Facility-Administered Medications   Medication Dose Route Frequency Provider Last Rate Last Admin    acetaminophen tablet 650 mg  650 mg Oral Q4H PRN Pablo Coates MD        albuterol inhaler 2 puff  2 puff Inhalation Q4H PRN Pablo Coates MD        albuterol sulfate nebulizer solution 10 mg  10 mg Nebulization Once Vishal Guillermo MD        [START ON 4/25/2024] atorvastatin tablet 20 mg  20 mg Oral Daily Pablo Coates MD        calcium gluconate 1 g in NS IVPB (premixed)  1 g Intravenous Once Vishal Guillermo MD        And    calcium gluconate 1 g in NS IVPB (premixed)  1 g Intravenous Q10 Min PRN Vishal Guillermo MD         dabigatran etexilate capsule 75 mg  75 mg Oral BID Pbalo Coates MD        dextrose 10% bolus 125 mL 125 mL  12.5 g Intravenous PRN Pablo Coates MD        dextrose 10% bolus 250 mL 250 mL  25 g Intravenous PRN Pablo Coates MD        fluticasone propionate 110 mcg/actuation inhaler 2 puff  2 puff Inhalation Q12H Pablo Coates MD        [START ON 4/25/2024] folic acid tablet 1 mg  1 mg Oral Daily Pablo Coates MD        [START ON 4/25/2024] furosemide tablet 40 mg  40 mg Oral Every other day Pablo Coates MD        glucagon (human recombinant) injection 1 mg  1 mg Intramuscular PRN Pablo Coates MD        glucagon (human recombinant) injection 1 mg  1 mg Intramuscular PRN Pablo Coates MD        glucose chewable tablet 16 g  16 g Oral PRN Pablo Coates MD        glucose chewable tablet 24 g  24 g Oral PRN Pablo Coates MD        insulin aspart U-100 pen 0-5 Units  0-5 Units Subcutaneous Q6H PRN Pablo Coates MD        [START ON 4/25/2024] levothyroxine tablet 100 mcg  100 mcg Oral Daily Pablo Coates MD        [START ON 4/25/2024] LIDOcaine 5 % patch 1 patch  1 patch Transdermal Daily Pablo Coates MD        [START ON 4/25/2024] metoprolol succinate (TOPROL-XL) 24 hr tablet 50 mg  50 mg Oral Daily Pablo Coates MD        midodrine tablet 2.5 mg  2.5 mg Oral TID Pablo Coates MD        naloxone 0.4 mg/mL injection 0.02 mg  0.02 mg Intravenous PRN Pablo Coates MD        [START ON 4/25/2024] pantoprazole EC tablet 40 mg  40 mg Oral Daily Pablo Coates MD        sodium chloride 0.9% flush 10 mL  10 mL Intravenous PRN Pablo Coates MD        [START ON 4/25/2024] spironolactone tablet 50 mg  50 mg Oral Daily Pablo Coates MD         Facility-Administered Medications Ordered in Other Encounters   Medication Dose Route Frequency Provider Last Rate Last Admin    sodium chloride 0.9% bolus 1,000 mL  1,000 mL Intravenous Once Ernestine Ahn, NP         Family History       Problem Relation (Age of Onset)    Heart attack Son  "         Tobacco Use    Smoking status: Never    Smokeless tobacco: Never   Substance and Sexual Activity    Alcohol use: Yes     Alcohol/week: 1.0 standard drink of alcohol     Types: 1 Cans of beer per week     Comment: Everyother day_Pt stated,"I drink a few beers."    Drug use: No    Sexual activity: Not on file     Review of Systems   Constitutional:  Positive for activity change and fatigue. Negative for chills and fever.   HENT:  Negative for sinus pressure and sinus pain.    Eyes:  Negative for visual disturbance.   Respiratory:  Negative for cough, chest tightness and shortness of breath.    Cardiovascular:  Negative for chest pain, palpitations and leg swelling.   Gastrointestinal:  Negative for abdominal distention, abdominal pain, constipation, diarrhea, nausea and vomiting.   Musculoskeletal:  Negative for back pain.   Skin:  Negative for rash.   Neurological:  Positive for tremors and weakness. Negative for light-headedness and headaches.   Hematological:  Bruises/bleeds easily.   Psychiatric/Behavioral:  Positive for confusion and decreased concentration.      Objective:     Vital Signs (Most Recent):  Temp: 97.7 °F (36.5 °C) (04/1945)  Pulse: 79 (04/1945)  Resp: 18 (04/1945)  BP: 97/67 (04/1945)  SpO2: 100 % (04/1945) Vital Signs (24h Range):  Temp:  [97.5 °F (36.4 °C)-97.7 °F (36.5 °C)] 97.7 °F (36.5 °C)  Pulse:  [] 79  Resp:  [18] 18  SpO2:  [99 %-100 %] 100 %  BP: ()/(66-77) 97/67     Weight: 96.6 kg (212 lb 15.4 oz)  Body mass index is 32.38 kg/m².     Physical Exam  Vitals and nursing note reviewed.   Constitutional:       Appearance: Normal appearance.   HENT:      Head: Normocephalic and atraumatic.   Eyes:      General: No scleral icterus.     Pupils: Pupils are equal, round, and reactive to light.   Cardiovascular:      Rate and Rhythm: Normal rate and regular rhythm.      Pulses: Normal pulses.      Heart sounds: Normal heart sounds.   Pulmonary:    "   Effort: Pulmonary effort is normal.      Breath sounds: Normal breath sounds.   Abdominal:      General: Abdomen is flat. There is no distension.      Palpations: Abdomen is soft.      Tenderness: There is no abdominal tenderness.   Musculoskeletal:      Right lower leg: No edema.      Left lower leg: No edema.   Lymphadenopathy:      Cervical: No cervical adenopathy.   Skin:     General: Skin is warm and dry.      Capillary Refill: Capillary refill takes less than 2 seconds.      Comments: Mild asterixis   Neurological:      General: No focal deficit present.      Mental Status: He is alert and oriented to person, place, and time.              CRANIAL NERVES     CN III, IV, VI   Pupils are equal, round, and reactive to light.       Significant Labs: All pertinent labs within the past 24 hours have been reviewed.    Significant Imaging: I have reviewed all pertinent imaging results/findings within the past 24 hours.

## 2024-04-25 NOTE — ASSESSMENT & PLAN NOTE
Appears of the dry side  -will check urine lytes/UOSM  -recommend to hold diuretics  -recommend LR @ 100 cc/hr x 5 hours  -BID monitoring

## 2024-04-25 NOTE — HOSPITAL COURSE
Patient with  history of alcoholic cirrhosis, was sent in from hepatology clinic for concerns for HE. Patient started on lactulose and rifaximin. Mentation improved and currently patient is awake, alert and oriented x 4. Patient reports that he feels at baseline and staying in the hospital makes him irritable so we would like to go home today. He understands his blood cx are still in process and if they result positive later patient may need to return to ER. Discussed with hepatology who is okay with d/c and will assist making a close OP follow up appointment for patient. Prescribed lactulose and rifaximin. Goal 3 or more BM per day. FU with PCP in 1 week. Patient remained stable on floors and is getting d/c home. Patient verbalized understanding. All questions were answered.

## 2024-04-25 NOTE — PLAN OF CARE
CHW scheduled pcp  Follow up with Dylon Chase MD  Wednesday May 1, 2024  @9:15 am [address]  [address]  [place] [address]  [phone]

## 2024-04-25 NOTE — ASSESSMENT & PLAN NOTE
Patient is a 78-year-old male with history of alcoholic cirrhosis and persistent AFib who is presenting with new onset hepatic encephalopathy.  He presents as a clinic admit for initiation of lactulose and rifaximin.  He was seen by Dr. Lara in clinic today and noted to have new onset hepatic encephalopathy.  Denies any abdominal pain.  Has been well compensated previously and has not required a paracentesis for 6 months now.  He was being managed almost exclusively by his family medicine doctor but recently started seeing a hepatologist as his disease became more complicated  .  Plan:  - lactulose and rifaximin  - delirium precautions  - no antibiotics at this time

## 2024-04-25 NOTE — ASSESSMENT & PLAN NOTE
Non-Oliguric YARITZA on CKD 3b  Baseline SCR appears to be ~ 2.0; likely from chronic liver disease.  Does have DM @ baseline although no proteinuria on UA.  He presents from hepatology clinic with supsected hepatic encephalopathy and admitted to hospital medicine for management.  Presented with YARITZA with SCR of 3.0.  His diuretics were held with improvement to 2.8.  Na Low but stable.  Nephrology consulted for YARITZA on CKD 3b on 4/25.    Suspect YARITZA is related to some degree of volume depletion.  He appears on the dry side on examination.  Collapsible IJ's on POCUS.      Plan/Recommendations  -Renal US  -urine lytes  -recommend to hold diuretics  -check VBG; can start oral bicarbonate tabs with acidemia is present  -recommend LR @ 100 cc/hr x 5 hours  -continue strict I/O's  -RFP BID  -will f/u

## 2024-04-26 DIAGNOSIS — N17.9 AKI (ACUTE KIDNEY INJURY): Primary | ICD-10-CM

## 2024-04-29 ENCOUNTER — TELEPHONE (OUTPATIENT)
Dept: HEPATOLOGY | Facility: CLINIC | Age: 79
End: 2024-04-29
Payer: MEDICARE

## 2024-04-29 LAB
BACTERIA BLD CULT: NORMAL
BACTERIA BLD CULT: NORMAL

## 2024-04-29 NOTE — TELEPHONE ENCOUNTER
The patient's wife was contacted to clarify if there were any follow-up visit scheduled.       ----- Message from Raven Cruz sent at 4/25/2024  2:18 PM CDT -----  Regarding: Scheduling Request  Contact: [email or web address]@ [facility]   Scheduling Request           Appt Type:  Ep     Date/Time Preference:any     Treating Provider:Gregg     Caller Name:[email or web address]@ [facility]      Contact Preference:[phone] (home)       Comments/notes:Irian@ [facility]  is calling to schedule patient for f/u after hospital inpatient. Requesting a call back

## 2024-04-30 ENCOUNTER — LAB VISIT (OUTPATIENT)
Dept: LAB | Facility: HOSPITAL | Age: 79
End: 2024-04-30
Attending: INTERNAL MEDICINE
Payer: MEDICARE

## 2024-04-30 DIAGNOSIS — N18.32 CHRONIC KIDNEY DISEASE, STAGE 3B: ICD-10-CM

## 2024-04-30 DIAGNOSIS — E87.5 HYPERKALEMIA: Primary | ICD-10-CM

## 2024-04-30 LAB
ALBUMIN SERPL BCP-MCNC: 3.5 G/DL (ref 3.5–5.2)
ALP SERPL-CCNC: 112 U/L (ref 38–126)
ALT SERPL W/O P-5'-P-CCNC: 42 U/L (ref 10–44)
ANION GAP SERPL CALC-SCNC: 10 MMOL/L (ref 8–16)
AST SERPL-CCNC: 41 U/L (ref 15–46)
BASOPHILS # BLD AUTO: 0.05 K/UL (ref 0–0.2)
BASOPHILS NFR BLD: 0.6 % (ref 0–1.9)
BILIRUB SERPL-MCNC: 1.9 MG/DL (ref 0.1–1)
CALCIUM SERPL-MCNC: 9.1 MG/DL (ref 8.7–10.5)
CHLORIDE SERPL-SCNC: 104 MMOL/L (ref 95–110)
CO2 SERPL-SCNC: 21 MMOL/L (ref 23–29)
CREAT SERPL-MCNC: 2.41 MG/DL (ref 0.5–1.4)
CREAT UR-MCNC: 79.7 MG/DL (ref 23–375)
DIFFERENTIAL METHOD BLD: ABNORMAL
EOSINOPHIL # BLD AUTO: 0.1 K/UL (ref 0–0.5)
EOSINOPHIL NFR BLD: 1.8 % (ref 0–8)
ERYTHROCYTE [DISTWIDTH] IN BLOOD BY AUTOMATED COUNT: 13.3 % (ref 11.5–14.5)
EST. GFR  (NO RACE VARIABLE): 26.8 ML/MIN/1.73 M^2
GLUCOSE SERPL-MCNC: 230 MG/DL (ref 70–110)
HCT VFR BLD AUTO: 39.8 % (ref 40–54)
HGB BLD-MCNC: 13.7 G/DL (ref 14–18)
IMM GRANULOCYTES # BLD AUTO: 0.03 K/UL (ref 0–0.04)
IMM GRANULOCYTES NFR BLD AUTO: 0.4 % (ref 0–0.5)
LYMPHOCYTES # BLD AUTO: 1.3 K/UL (ref 1–4.8)
LYMPHOCYTES NFR BLD: 17.1 % (ref 18–48)
MCH RBC QN AUTO: 34 PG (ref 27–31)
MCHC RBC AUTO-ENTMCNC: 34.4 G/DL (ref 32–36)
MCV RBC AUTO: 99 FL (ref 82–98)
MONOCYTES # BLD AUTO: 0.7 K/UL (ref 0.3–1)
MONOCYTES NFR BLD: 9.2 % (ref 4–15)
NEUTROPHILS # BLD AUTO: 5.6 K/UL (ref 1.8–7.7)
NEUTROPHILS NFR BLD: 70.9 % (ref 38–73)
NRBC BLD-RTO: 0 /100 WBC
PLATELET # BLD AUTO: 199 K/UL (ref 150–450)
PMV BLD AUTO: 10.1 FL (ref 9.2–12.9)
POTASSIUM SERPL-SCNC: 5.9 MMOL/L (ref 3.5–5.1)
PROT SERPL-MCNC: 8.2 G/DL (ref 6–8.4)
PROT UR-MCNC: <7 MG/DL (ref 0–15)
PROT/CREAT UR: NORMAL MG/G{CREAT} (ref 0–0.2)
RBC # BLD AUTO: 4.03 M/UL (ref 4.6–6.2)
SODIUM SERPL-SCNC: 135 MMOL/L (ref 136–145)
UUN UR-MCNC: 43 MG/DL (ref 2–20)
WBC # BLD AUTO: 7.85 K/UL (ref 3.9–12.7)

## 2024-04-30 PROCEDURE — 84156 ASSAY OF PROTEIN URINE: CPT | Performed by: INTERNAL MEDICINE

## 2024-04-30 PROCEDURE — 36415 COLL VENOUS BLD VENIPUNCTURE: CPT | Mod: PN | Performed by: INTERNAL MEDICINE

## 2024-04-30 PROCEDURE — 85025 COMPLETE CBC W/AUTO DIFF WBC: CPT | Mod: PN | Performed by: INTERNAL MEDICINE

## 2024-04-30 PROCEDURE — 80053 COMPREHEN METABOLIC PANEL: CPT | Mod: PN | Performed by: INTERNAL MEDICINE

## 2024-05-03 ENCOUNTER — TELEPHONE (OUTPATIENT)
Dept: HEPATOLOGY | Facility: CLINIC | Age: 79
End: 2024-05-03
Payer: MEDICARE

## 2024-05-03 NOTE — TELEPHONE ENCOUNTER
I spoke with patient's wife Merna and discussed Lactulose use.  I told her that the goal is to produce 3 -5 soft stools daily - not diarrhea.  I stressed that it was okay to reduce dose to once or twice daily if needed.

## 2024-05-03 NOTE — TELEPHONE ENCOUNTER
"----- Message from Cash Seay sent at 5/3/2024 10:04 AM CDT -----  Regarding: call back  Consult/Advisory:        Name Of Caller: wife Merna     Contact Preference?:[phone]     What is the nature of the call?: Calling to speak w/ someone in regards to scheduling hospital follow up  requesting call back      Additional Notes:  "Thank you for all that you do for our patients"  "

## 2024-05-03 NOTE — TELEPHONE ENCOUNTER
"----- Message from Zach Strauss MA sent at 5/3/2024 11:00 AM CDT -----  Regarding: FW: call back  Pt has questions regarding lactulose and how often he should be taking it.  Zach  ----- Message -----  From: Cash Seay  Sent: 5/3/2024  10:06 AM CDT  To: [facility] Staff  Subject: call back                                        Consult/Advisory:        Name Of Caller: wife Merna     Contact Preference?:[phone]     What is the nature of the call?: Calling to speak w/ someone in regards to scheduling hospital follow up  requesting call back      Additional Notes:  "Thank you for all that you do for our patients"  "

## 2024-05-08 ENCOUNTER — TELEPHONE (OUTPATIENT)
Dept: HEPATOLOGY | Facility: CLINIC | Age: 79
End: 2024-05-08
Payer: MEDICARE

## 2024-05-08 NOTE — TELEPHONE ENCOUNTER
----- Message from Catherine Gupta sent at 5/8/2024 11:32 AM CDT -----  Regarding: Consult/Advisory  Contact: [phone]  CONSULT/ADVISORY    Name of Caller:  Merna (Spouse)    Contact Preference:   [phone]    Nature of Call:  Pts spouse is requesting appt on 07/02/2024 at 9am can be scheduled at a later time due to they're traveling from [place].  Please call to confirm.

## 2024-05-08 NOTE — TELEPHONE ENCOUNTER
Spoke with patient wife informing her hospital follow ups are only in the morning. Patient wife verbalized understanding.

## 2024-05-15 ENCOUNTER — LAB VISIT (OUTPATIENT)
Dept: LAB | Facility: HOSPITAL | Age: 79
End: 2024-05-15
Attending: INTERNAL MEDICINE
Payer: MEDICARE

## 2024-05-15 DIAGNOSIS — E87.20 ACIDOSIS, UNSPECIFIED: Primary | ICD-10-CM

## 2024-05-15 DIAGNOSIS — E87.1 HYPO-OSMOLALITY AND HYPONATREMIA: ICD-10-CM

## 2024-05-15 LAB
ALBUMIN SERPL BCP-MCNC: 3.5 G/DL (ref 3.5–5.2)
ALP SERPL-CCNC: 114 U/L (ref 38–126)
ALT SERPL W/O P-5'-P-CCNC: 31 U/L (ref 10–44)
ANION GAP SERPL CALC-SCNC: 10 MMOL/L (ref 8–16)
AST SERPL-CCNC: 39 U/L (ref 15–46)
BASOPHILS # BLD AUTO: 0.05 K/UL (ref 0–0.2)
BASOPHILS NFR BLD: 0.7 % (ref 0–1.9)
BILIRUB SERPL-MCNC: 1.2 MG/DL (ref 0.1–1)
CALCIUM SERPL-MCNC: 8.9 MG/DL (ref 8.7–10.5)
CHLORIDE SERPL-SCNC: 108 MMOL/L (ref 95–110)
CO2 SERPL-SCNC: 23 MMOL/L (ref 23–29)
CREAT SERPL-MCNC: 2.21 MG/DL (ref 0.5–1.4)
DIFFERENTIAL METHOD BLD: ABNORMAL
EOSINOPHIL # BLD AUTO: 0.1 K/UL (ref 0–0.5)
EOSINOPHIL NFR BLD: 1.4 % (ref 0–8)
ERYTHROCYTE [DISTWIDTH] IN BLOOD BY AUTOMATED COUNT: 13.6 % (ref 11.5–14.5)
EST. GFR  (NO RACE VARIABLE): 29.7 ML/MIN/1.73 M^2
GLUCOSE SERPL-MCNC: 191 MG/DL (ref 70–110)
HCT VFR BLD AUTO: 40 % (ref 40–54)
HGB BLD-MCNC: 13.5 G/DL (ref 14–18)
IMM GRANULOCYTES # BLD AUTO: 0.03 K/UL (ref 0–0.04)
IMM GRANULOCYTES NFR BLD AUTO: 0.4 % (ref 0–0.5)
LYMPHOCYTES # BLD AUTO: 1.3 K/UL (ref 1–4.8)
LYMPHOCYTES NFR BLD: 18.3 % (ref 18–48)
MCH RBC QN AUTO: 34 PG (ref 27–31)
MCHC RBC AUTO-ENTMCNC: 33.8 G/DL (ref 32–36)
MCV RBC AUTO: 101 FL (ref 82–98)
MONOCYTES # BLD AUTO: 0.6 K/UL (ref 0.3–1)
MONOCYTES NFR BLD: 8 % (ref 4–15)
NEUTROPHILS # BLD AUTO: 5.2 K/UL (ref 1.8–7.7)
NEUTROPHILS NFR BLD: 71.2 % (ref 38–73)
NRBC BLD-RTO: 0 /100 WBC
PLATELET # BLD AUTO: 219 K/UL (ref 150–450)
PMV BLD AUTO: 9.9 FL (ref 9.2–12.9)
POTASSIUM SERPL-SCNC: 5.6 MMOL/L (ref 3.5–5.1)
PROT SERPL-MCNC: 7.7 G/DL (ref 6–8.4)
RBC # BLD AUTO: 3.97 M/UL (ref 4.6–6.2)
SODIUM SERPL-SCNC: 141 MMOL/L (ref 136–145)
UUN UR-MCNC: 29 MG/DL (ref 2–20)
WBC # BLD AUTO: 7.26 K/UL (ref 3.9–12.7)

## 2024-05-15 PROCEDURE — 36415 COLL VENOUS BLD VENIPUNCTURE: CPT | Mod: PN | Performed by: INTERNAL MEDICINE

## 2024-05-15 PROCEDURE — 85025 COMPLETE CBC W/AUTO DIFF WBC: CPT | Mod: PN | Performed by: INTERNAL MEDICINE

## 2024-05-15 PROCEDURE — 80053 COMPREHEN METABOLIC PANEL: CPT | Mod: PN | Performed by: INTERNAL MEDICINE

## 2024-06-05 PROBLEM — Z79.01 ON ANTICOAGULANT THERAPY: Status: ACTIVE | Noted: 2024-06-05

## 2024-06-05 NOTE — PROGRESS NOTES
Mr. Kam is a patient of Dr. Eric and was last seen in clinic 10/10/2023.      Subjective:   Patient ID:  Philip Kam Sr. is a 78 y.o. male who presents for follow up of Atrial Fibrillation  .     HPI:    Mr. Kam is a 78 y.o. male with HTN, DM, obesity, AF here for follow up.    Background:    Primary Care Physician: Suraj Chase III, MD    Mr. Eddy Banuelos has a hx of hypertension, diabetes mellitus type 2 and obesity. He was in his usual state of health until he presented to the ER on 3/13/2021 with back pain and lower extremity edema. He was observed to be in atrial fibrillation with RVR, which was a new diagnosis for him. TSH was normal. BNP was mildly elevated. He was given IV lasix and metoprolol and was discharged on metoprolol and eliquis. An echocardiogram was performed on 3/22/2021 which noted preserved LV function and mild left atrial enlargement. He presents for evaluation.     Available electrocardiograms in Epic which show sinus rhythm with PACs until 3/13/2021 which shows AF (last prior ECG was from 2018). A holter monitor from 4/2018 noted sinus rhythm with 15% PAC burden.      7/2021:  Mr. Eddy Banuelos returns for follow-up. He underwent DCCV in April 2021 with recurrence noted on his 1 week post-DCCV ECG. His flecainide was increased and DCCV was performed again 5/2021. 1 week post ecg noted AF again. He elected to not pursue any further rhythm control therapy. Recent holter monitor noted adequate rate control. He feels well.  AF with an average ventricular rate of 93 bpm.     10/2023:  Last seen in 2021 with plans for rate control with metoprolol.   Feels well overall from a rhythm standpoint. Has developed cirrhosis s/p paracentesis. Followed by hepatology and managing diuretics. Also seen by nephrology for CKD. Metoprolol decreased to 50 mg daily.   Holter 9/23: with controlled persistent AF  Cr 2.1  ECG/telemetry strip today which shows AF  CHADS-VASc 4 but with Child-Damon class B  cirrhosis. He should remain on OAC, however, contraindicated to use Eliquis.    - Start Pradaxa  - Stop Eliquis  - Continue Toprol XL 50 mg daily     Update (06/10/2024):    4/25/2024: Patient with  history of alcoholic cirrhosis, was sent in from hepatology clinic for concerns for HE. Patient started on lactulose and rifaximin. Mentation improved and currently patient is awake, alert and oriented x 4. Patient reports that he feels at baseline and staying in the hospital makes him irritable so he would like to go home today.      Today he says he is at baseline. No worsening RILEY, CP, palps, LH, syncope reported.    He is currently taking praxada 150mg for stroke prophylaxis once daily (due to cost) and denies significant bleeding episodes. He is currently being treated with metoprolol 50mg daily for HR control.  Kidney function is low, with a creatinine of 2.21 on 5/15/2024. CRCL 41.    I have personally reviewed the patient's EKG today, which shows AF at 101bpm. QRS is 84. QTc is 469. He says he had been walking after not walking for a while.     Relevant Cardiac Test Results:    2D Echo (1/2023):  The left ventricle is normal in size with concentric remodeling and normal systolic function.  There are segmental left ventricular wall motion abnormalities.  Normal right ventricular size with normal right ventricular systolic function.  Severe left atrial enlargement.  Mild mitral regurgitation.  There is mild-to-moderate aortic valve stenosis.  Aortic valve area is 1.14 cm2; peak velocity is 2.46 m/s; mean gradient is 17 mmHg.  The estimated ejection fraction is 55%.  Normal left ventricular diastolic function.    Current Outpatient Medications   Medication Sig    albuterol (PROVENTIL/VENTOLIN HFA) 90 mcg/actuation inhaler     azelastine (ASTELIN) 137 mcg (0.1 %) nasal spray 1 spray by Nasal route 2 (two) times daily.    b complex vitamins tablet Take 1 tablet by mouth once daily.    dabigatran etexilate (PRADAXA)  "150 mg Cap Take 1 capsule (150 mg total) by mouth 2 (two) times a day. "Do NOT break, chew, or open capsules."    ferrous sulfate (FEOSOL) Tab tablet Take 1 tablet by mouth daily with breakfast.    fluticasone propionate (FLOVENT DISKUS) 50 mcg/actuation DsDv Inhale into the lungs. Controller    folic acid (FOLVITE) 400 MCG tablet Take 400 mcg by mouth once daily.    furosemide (LASIX) 40 MG tablet Take 1 tablet (40 mg total) by mouth 2 (two) times a day.    glipiZIDE (GLUCOTROL) 5 MG tablet Take 5 mg by mouth once daily.    levothyroxine (SYNTHROID) 100 MCG tablet Take 100 mcg by mouth once daily.     LORazepam (ATIVAN) 0.5 MG tablet Take 0.5 mg by mouth every 6 (six) hours as needed for Anxiety.    metoprolol succinate (TOPROL-XL) 50 MG 24 hr tablet Take 1 tablet (50 mg total) by mouth once daily.    midodrine (PROAMATINE) 2.5 MG Tab Take 2.5 mg by mouth 3 (three) times daily.    pantoprazole (PROTONIX) 40 MG tablet Take 40 mg by mouth once daily.    simvastatin (ZOCOR) 40 MG tablet Take 40 mg by mouth every evening.    SITagliptin phosphate (JANUVIA) 50 MG Tab Take 25 mg by mouth once daily.    spironolactone (ALDACTONE) 100 MG tablet Take 100 mg by mouth.     No current facility-administered medications for this visit.     Facility-Administered Medications Ordered in Other Visits   Medication    sodium chloride 0.9% bolus 1,000 mL       Review of Systems   Constitutional: Negative for malaise/fatigue.   Cardiovascular:  Negative for chest pain, dyspnea on exertion, irregular heartbeat, leg swelling and palpitations.   Respiratory:  Negative for shortness of breath.    Hematologic/Lymphatic: Negative for bleeding problem.   Skin:  Negative for rash.   Musculoskeletal:  Negative for myalgias.   Gastrointestinal:  Negative for hematemesis, hematochezia and nausea.   Genitourinary:  Negative for hematuria.   Neurological:  Negative for light-headedness.   Psychiatric/Behavioral:  Negative for altered mental status.  " "  Allergic/Immunologic: Negative for persistent infections.       Objective:          /67   Pulse 101   Ht 5' 8" (1.727 m)   Wt 105.3 kg (232 lb 2.3 oz)   BMI 35.30 kg/m²     Physical Exam  Vitals and nursing note reviewed.   Constitutional:       Appearance: Normal appearance. He is well-developed.   HENT:      Head: Normocephalic.      Nose: Nose normal.   Eyes:      Pupils: Pupils are equal, round, and reactive to light.   Cardiovascular:      Rate and Rhythm: Normal rate. Rhythm irregularly irregular.   Pulmonary:      Effort: No respiratory distress.      Breath sounds: Normal breath sounds.   Musculoskeletal:         General: Normal range of motion.   Skin:     General: Skin is warm and dry.      Findings: No erythema.   Neurological:      Mental Status: He is alert and oriented to person, place, and time.   Psychiatric:         Speech: Speech normal.         Behavior: Behavior normal.           Lab Results   Component Value Date     05/15/2024    K 5.6 (H) 05/15/2024    MG 2.1 04/25/2024    BUN 29 (H) 05/15/2024    CREATININE 2.21 (H) 05/15/2024    ALT 31 05/15/2024    AST 39 05/15/2024    HGB 13.5 (L) 05/15/2024    HCT 40.0 05/15/2024    TSH 1.130 03/22/2022    LDLCALC 63.0 03/22/2022       Recent Labs   Lab 07/07/23  1519 04/24/24  1905   INR 1.2 1.3 H       Assessment:     1. Persistent atrial fibrillation    2. Essential hypertension    3. BMI 36.0-36.9,adult    4. On anticoagulant therapy      Plan:     In summary, Mr. Kam is a 78 y.o. male with HTN, DM, obesity, AF here for follow up.  Stable from a rhythm standpoint. Asymptomatic AF in rate control strategy on metoprolol. Will update echo to confirm stability of LV function.   He has only been taking pradaxa once daily due to cost. Emphasized the importance of taking BID for adequate stroke protection. Discussed switching to warfarin. He will start taking BID.    Pradaxa 150mg BID  Echo  Continue other meds  If testing WNL, RTC 1 yr, " sooner if needed    *A copy of this note has been sent to Dr. Eric*    Follow up in about 1 year (around 6/10/2025).    ------------------------------------------------------------------    LISS Arreola, NP-C  Cardiac Electrophysiology

## 2024-06-10 ENCOUNTER — HOSPITAL ENCOUNTER (OUTPATIENT)
Dept: CARDIOLOGY | Facility: CLINIC | Age: 79
Discharge: HOME | End: 2024-06-10
Payer: MEDICARE

## 2024-06-10 ENCOUNTER — OFFICE VISIT (OUTPATIENT)
Dept: ELECTROPHYSIOLOGY | Facility: CLINIC | Age: 79
End: 2024-06-10
Payer: MEDICARE

## 2024-06-10 VITALS
WEIGHT: 232.13 LBS | BODY MASS INDEX: 35.18 KG/M2 | HEART RATE: 101 BPM | DIASTOLIC BLOOD PRESSURE: 67 MMHG | HEIGHT: 68 IN | SYSTOLIC BLOOD PRESSURE: 120 MMHG

## 2024-06-10 DIAGNOSIS — I48.19 PERSISTENT ATRIAL FIBRILLATION: ICD-10-CM

## 2024-06-10 DIAGNOSIS — I10 ESSENTIAL HYPERTENSION: ICD-10-CM

## 2024-06-10 DIAGNOSIS — I48.19 PERSISTENT ATRIAL FIBRILLATION: Primary | ICD-10-CM

## 2024-06-10 DIAGNOSIS — Z79.01 ON ANTICOAGULANT THERAPY: ICD-10-CM

## 2024-06-10 LAB
OHS QRS DURATION: 84 MS
OHS QTC CALCULATION: 469 MS

## 2024-06-10 PROCEDURE — 1160F RVW MEDS BY RX/DR IN RCRD: CPT | Mod: CPTII,S$GLB,, | Performed by: NURSE PRACTITIONER

## 2024-06-10 PROCEDURE — 3074F SYST BP LT 130 MM HG: CPT | Mod: CPTII,S$GLB,, | Performed by: NURSE PRACTITIONER

## 2024-06-10 PROCEDURE — 93005 ELECTROCARDIOGRAM TRACING: CPT | Mod: S$GLB,,, | Performed by: INTERNAL MEDICINE

## 2024-06-10 PROCEDURE — 3288F FALL RISK ASSESSMENT DOCD: CPT | Mod: CPTII,S$GLB,, | Performed by: NURSE PRACTITIONER

## 2024-06-10 PROCEDURE — 3078F DIAST BP <80 MM HG: CPT | Mod: CPTII,S$GLB,, | Performed by: NURSE PRACTITIONER

## 2024-06-10 PROCEDURE — 1101F PT FALLS ASSESS-DOCD LE1/YR: CPT | Mod: CPTII,S$GLB,, | Performed by: NURSE PRACTITIONER

## 2024-06-10 PROCEDURE — 93010 ELECTROCARDIOGRAM REPORT: CPT | Mod: S$GLB,,, | Performed by: INTERNAL MEDICINE

## 2024-06-10 PROCEDURE — 1159F MED LIST DOCD IN RCRD: CPT | Mod: CPTII,S$GLB,, | Performed by: NURSE PRACTITIONER

## 2024-06-10 PROCEDURE — 1126F AMNT PAIN NOTED NONE PRSNT: CPT | Mod: CPTII,S$GLB,, | Performed by: NURSE PRACTITIONER

## 2024-06-10 PROCEDURE — 99999 PR PBB SHADOW E&M-EST. PATIENT-LVL IV: CPT | Mod: PBBFAC,,, | Performed by: NURSE PRACTITIONER

## 2024-06-10 PROCEDURE — 99214 OFFICE O/P EST MOD 30 MIN: CPT | Mod: S$GLB,,, | Performed by: NURSE PRACTITIONER

## 2024-06-19 ENCOUNTER — HOSPITAL ENCOUNTER (OUTPATIENT)
Dept: CARDIOLOGY | Facility: HOSPITAL | Age: 79
Discharge: HOME | End: 2024-06-19
Attending: NURSE PRACTITIONER
Payer: MEDICARE

## 2024-06-19 VITALS — BODY MASS INDEX: 35.16 KG/M2 | WEIGHT: 232 LBS | HEART RATE: 89 BPM | HEIGHT: 68 IN

## 2024-06-19 DIAGNOSIS — I48.19 PERSISTENT ATRIAL FIBRILLATION: ICD-10-CM

## 2024-06-19 LAB
APICAL FOUR CHAMBER EJECTION FRACTION: 73 %
APICAL TWO CHAMBER EJECTION FRACTION: 66 %
ASCENDING AORTA: 3.32 CM
AV INDEX (PROSTH): 0.3
AV MEAN GRADIENT: 12 MMHG
AV PEAK GRADIENT: 20 MMHG
AV VALVE AREA BY VELOCITY RATIO: 0.95 CM²
AV VALVE AREA: 0.99 CM²
AV VELOCITY RATIO: 0.29
BSA FOR ECHO PROCEDURE: 2.25 M2
CV ECHO LV RWT: 0.62 CM
DOP CALC AO PEAK VEL: 2.26 M/S
DOP CALC AO VTI: 44.1 CM
DOP CALC LVOT AREA: 3.3 CM2
DOP CALC LVOT DIAMETER: 2.04 CM
DOP CALC LVOT PEAK VEL: 0.66 M/S
DOP CALC LVOT STROKE VOLUME: 43.78 CM3
DOP CALCLVOT PEAK VEL VTI: 13.4 CM
E WAVE DECELERATION TIME: 200.69 MSEC
E/A RATIO: 2.23
ECHO LV POSTERIOR WALL: 1.18 CM (ref 0.6–1.1)
FRACTIONAL SHORTENING: 44 % (ref 28–44)
INTERVENTRICULAR SEPTUM: 1.2 CM (ref 0.6–1.1)
IVRT: 82.78 MSEC
LA MAJOR: 6.05 CM
LA MINOR: 6.05 CM
LA WIDTH: 4.25 CM
LEFT ATRIUM AREA SYSTOLIC (APICAL 2 CHAMBER): 23.78 CM2
LEFT ATRIUM AREA SYSTOLIC (APICAL 4 CHAMBER): 22.16 CM2
LEFT ATRIUM SIZE: 4.4 CM
LEFT ATRIUM VOLUME INDEX MOD: 30.1 ML/M2
LEFT ATRIUM VOLUME INDEX: 44.1 ML/M2
LEFT ATRIUM VOLUME MOD: 65.68 CM3
LEFT ATRIUM VOLUME: 96.16 CM3
LEFT INTERNAL DIMENSION IN SYSTOLE: 2.12 CM (ref 2.1–4)
LEFT VENTRICLE DIASTOLIC VOLUME INDEX: 28.37 ML/M2
LEFT VENTRICLE DIASTOLIC VOLUME: 61.85 ML
LEFT VENTRICLE END DIASTOLIC VOLUME APICAL 2 CHAMBER: 14.38 ML
LEFT VENTRICLE END DIASTOLIC VOLUME APICAL 4 CHAMBER: 21.98 ML
LEFT VENTRICLE END SYSTOLIC VOLUME APICAL 2 CHAMBER: 68.41 ML
LEFT VENTRICLE END SYSTOLIC VOLUME APICAL 4 CHAMBER: 62.12 ML
LEFT VENTRICLE MASS INDEX: 69 G/M2
LEFT VENTRICLE SYSTOLIC VOLUME INDEX: 6.8 ML/M2
LEFT VENTRICLE SYSTOLIC VOLUME: 14.75 ML
LEFT VENTRICULAR INTERNAL DIMENSION IN DIASTOLE: 3.8 CM (ref 3.5–6)
LEFT VENTRICULAR MASS: 151.32 G
LV LATERAL E/E' RATIO: 29.4 M/S
LVED V (TEICH): 61.85 ML
LVES V (TEICH): 14.75 ML
LVOT MG: 1.3 MMHG
LVOT MV: 0.56 CM/S
MV PEAK A VEL: 0.66 M/S
MV PEAK E VEL: 1.47 M/S
MV STENOSIS PRESSURE HALF TIME: 58.2 MS
MV VALVE AREA P 1/2 METHOD: 3.78 CM2
OHS CV RV/LV RATIO: 0.71 CM
OHS LV EJECTION FRACTION SIMPSONS BIPLANE MOD: 71 %
PISA TR MAX VEL: 2.6 M/S
RA MAJOR: 5.13 CM
RA PRESSURE ESTIMATED: 3 MMHG
RA WIDTH: 3.43 CM
RIGHT VENTRICLE DIASTOLIC BASEL DIMENSION: 2.7 CM
RIGHT VENTRICULAR END-DIASTOLIC DIMENSION: 2.68 CM
RV TB RVSP: 6 MMHG
RV TISSUE DOPPLER FREE WALL SYSTOLIC VELOCITY 1 (APICAL 4 CHAMBER VIEW): 9.53 CM/S
SINUS: 3.29 CM
STJ: 2.9 CM
TDI LATERAL: 0.05 M/S
TR MAX PG: 27 MMHG
TRICUSPID ANNULAR PLANE SYSTOLIC EXCURSION: 1.85 CM
TV REST PULMONARY ARTERY PRESSURE: 30 MMHG
Z-SCORE OF LEFT VENTRICULAR DIMENSION IN END DIASTOLE: -6.51
Z-SCORE OF LEFT VENTRICULAR DIMENSION IN END SYSTOLE: -5.83

## 2024-06-19 PROCEDURE — 93306 TTE W/DOPPLER COMPLETE: CPT | Mod: 26,,, | Performed by: STUDENT IN AN ORGANIZED HEALTH CARE EDUCATION/TRAINING PROGRAM

## 2024-06-19 PROCEDURE — 93306 TTE W/DOPPLER COMPLETE: CPT

## 2024-06-20 ENCOUNTER — TELEPHONE (OUTPATIENT)
Dept: ELECTROPHYSIOLOGY | Facility: CLINIC | Age: 79
End: 2024-06-20
Payer: MEDICARE

## 2024-06-20 DIAGNOSIS — I51.89 DIASTOLIC DYSFUNCTION: Primary | ICD-10-CM

## 2024-06-20 DIAGNOSIS — I35.0 AORTIC VALVE STENOSIS, ETIOLOGY OF CARDIAC VALVE DISEASE UNSPECIFIED: ICD-10-CM

## 2024-06-27 ENCOUNTER — LAB VISIT (OUTPATIENT)
Dept: LAB | Facility: HOSPITAL | Age: 79
End: 2024-06-27
Attending: INTERNAL MEDICINE
Payer: MEDICARE

## 2024-06-27 DIAGNOSIS — E87.20 ACIDOSIS, UNSPECIFIED: Primary | ICD-10-CM

## 2024-06-27 DIAGNOSIS — E87.1 HYPO-OSMOLALITY AND HYPONATREMIA: ICD-10-CM

## 2024-06-27 LAB
ALBUMIN SERPL BCP-MCNC: 3.4 G/DL (ref 3.5–5.2)
ALP SERPL-CCNC: 92 U/L (ref 38–126)
ALT SERPL W/O P-5'-P-CCNC: 25 U/L (ref 10–44)
ANION GAP SERPL CALC-SCNC: 7 MMOL/L (ref 8–16)
AST SERPL-CCNC: 49 U/L (ref 15–46)
BASOPHILS # BLD AUTO: 0.06 K/UL (ref 0–0.2)
BASOPHILS NFR BLD: 0.8 % (ref 0–1.9)
BILIRUB SERPL-MCNC: 1.9 MG/DL (ref 0.1–1)
CALCIUM SERPL-MCNC: 8.6 MG/DL (ref 8.7–10.5)
CHLORIDE SERPL-SCNC: 106 MMOL/L (ref 95–110)
CO2 SERPL-SCNC: 28 MMOL/L (ref 23–29)
CREAT SERPL-MCNC: 1.98 MG/DL (ref 0.5–1.4)
DIFFERENTIAL METHOD BLD: ABNORMAL
EOSINOPHIL # BLD AUTO: 0.2 K/UL (ref 0–0.5)
EOSINOPHIL NFR BLD: 2.2 % (ref 0–8)
ERYTHROCYTE [DISTWIDTH] IN BLOOD BY AUTOMATED COUNT: 12.7 % (ref 11.5–14.5)
EST. GFR  (NO RACE VARIABLE): 33.9 ML/MIN/1.73 M^2
GLUCOSE SERPL-MCNC: 123 MG/DL (ref 70–110)
HCT VFR BLD AUTO: 41 % (ref 40–54)
HGB BLD-MCNC: 13.8 G/DL (ref 14–18)
IMM GRANULOCYTES # BLD AUTO: 0.02 K/UL (ref 0–0.04)
IMM GRANULOCYTES NFR BLD AUTO: 0.3 % (ref 0–0.5)
LYMPHOCYTES # BLD AUTO: 1.4 K/UL (ref 1–4.8)
LYMPHOCYTES NFR BLD: 18.3 % (ref 18–48)
MCH RBC QN AUTO: 34.5 PG (ref 27–31)
MCHC RBC AUTO-ENTMCNC: 33.7 G/DL (ref 32–36)
MCV RBC AUTO: 103 FL (ref 82–98)
MONOCYTES # BLD AUTO: 0.7 K/UL (ref 0.3–1)
MONOCYTES NFR BLD: 9.5 % (ref 4–15)
NEUTROPHILS # BLD AUTO: 5.3 K/UL (ref 1.8–7.7)
NEUTROPHILS NFR BLD: 68.9 % (ref 38–73)
NRBC BLD-RTO: 0 /100 WBC
PLATELET # BLD AUTO: 196 K/UL (ref 150–450)
PMV BLD AUTO: 10.1 FL (ref 9.2–12.9)
POTASSIUM SERPL-SCNC: 3.8 MMOL/L (ref 3.5–5.1)
PROT SERPL-MCNC: 7.7 G/DL (ref 6–8.4)
RBC # BLD AUTO: 4 M/UL (ref 4.6–6.2)
SODIUM SERPL-SCNC: 141 MMOL/L (ref 136–145)
UUN UR-MCNC: 23 MG/DL (ref 2–20)
WBC # BLD AUTO: 7.67 K/UL (ref 3.9–12.7)

## 2024-06-27 PROCEDURE — 80053 COMPREHEN METABOLIC PANEL: CPT | Mod: PN | Performed by: INTERNAL MEDICINE

## 2024-06-27 PROCEDURE — 36415 COLL VENOUS BLD VENIPUNCTURE: CPT | Mod: PN | Performed by: INTERNAL MEDICINE

## 2024-06-27 PROCEDURE — 85025 COMPLETE CBC W/AUTO DIFF WBC: CPT | Mod: PN | Performed by: INTERNAL MEDICINE

## 2024-07-02 ENCOUNTER — OFFICE VISIT (OUTPATIENT)
Dept: HEPATOLOGY | Facility: CLINIC | Age: 79
End: 2024-07-02
Payer: MEDICARE

## 2024-07-02 VITALS
BODY MASS INDEX: 36.79 KG/M2 | WEIGHT: 242.75 LBS | SYSTOLIC BLOOD PRESSURE: 139 MMHG | OXYGEN SATURATION: 95 % | HEART RATE: 91 BPM | HEIGHT: 68 IN | DIASTOLIC BLOOD PRESSURE: 80 MMHG

## 2024-07-02 DIAGNOSIS — E11.65 TYPE 2 DIABETES MELLITUS WITH HYPERGLYCEMIA, WITHOUT LONG-TERM CURRENT USE OF INSULIN: ICD-10-CM

## 2024-07-02 DIAGNOSIS — R18.8 CIRRHOSIS OF LIVER WITH ASCITES, UNSPECIFIED HEPATIC CIRRHOSIS TYPE: ICD-10-CM

## 2024-07-02 DIAGNOSIS — K72.91 HEPATIC COMA/ENCEPHALOPATHY: Primary | ICD-10-CM

## 2024-07-02 DIAGNOSIS — K70.31 ASCITES DUE TO ALCOHOLIC CIRRHOSIS: ICD-10-CM

## 2024-07-02 DIAGNOSIS — K74.60 CIRRHOSIS OF LIVER WITH ASCITES, UNSPECIFIED HEPATIC CIRRHOSIS TYPE: ICD-10-CM

## 2024-07-02 DIAGNOSIS — I48.21 PERMANENT ATRIAL FIBRILLATION: ICD-10-CM

## 2024-07-02 DIAGNOSIS — Z79.01 ON ANTICOAGULANT THERAPY: ICD-10-CM

## 2024-07-02 PROCEDURE — 1101F PT FALLS ASSESS-DOCD LE1/YR: CPT | Mod: CPTII,S$GLB,, | Performed by: INTERNAL MEDICINE

## 2024-07-02 PROCEDURE — 3079F DIAST BP 80-89 MM HG: CPT | Mod: CPTII,S$GLB,, | Performed by: INTERNAL MEDICINE

## 2024-07-02 PROCEDURE — 3075F SYST BP GE 130 - 139MM HG: CPT | Mod: CPTII,S$GLB,, | Performed by: INTERNAL MEDICINE

## 2024-07-02 PROCEDURE — 1159F MED LIST DOCD IN RCRD: CPT | Mod: CPTII,S$GLB,, | Performed by: INTERNAL MEDICINE

## 2024-07-02 PROCEDURE — 99215 OFFICE O/P EST HI 40 MIN: CPT | Mod: S$GLB,,, | Performed by: INTERNAL MEDICINE

## 2024-07-02 PROCEDURE — G2211 COMPLEX E/M VISIT ADD ON: HCPCS | Mod: S$GLB,,, | Performed by: INTERNAL MEDICINE

## 2024-07-02 PROCEDURE — 3288F FALL RISK ASSESSMENT DOCD: CPT | Mod: CPTII,S$GLB,, | Performed by: INTERNAL MEDICINE

## 2024-07-02 PROCEDURE — 99999 PR PBB SHADOW E&M-EST. PATIENT-LVL III: CPT | Mod: PBBFAC,,, | Performed by: INTERNAL MEDICINE

## 2024-07-02 PROCEDURE — 1126F AMNT PAIN NOTED NONE PRSNT: CPT | Mod: CPTII,S$GLB,, | Performed by: INTERNAL MEDICINE

## 2024-07-02 PROCEDURE — 1160F RVW MEDS BY RX/DR IN RCRD: CPT | Mod: CPTII,S$GLB,, | Performed by: INTERNAL MEDICINE

## 2024-07-02 RX ORDER — INSULIN GLARGINE 300 U/ML
INJECTION, SOLUTION SUBCUTANEOUS
COMMUNITY
Start: 2024-06-17

## 2024-07-02 RX ORDER — TAMSULOSIN HYDROCHLORIDE 0.4 MG/1
1 CAPSULE ORAL
COMMUNITY
Start: 2024-06-25

## 2024-07-02 RX ORDER — RIFAXIMIN 550 MG/1
550 TABLET ORAL 2 TIMES DAILY
COMMUNITY
Start: 2024-07-01 | End: 2024-07-02 | Stop reason: SDUPTHER

## 2024-07-02 RX ORDER — RIFAXIMIN 550 MG/1
550 TABLET ORAL 2 TIMES DAILY
Qty: 60 TABLET | Refills: 11 | Status: SHIPPED | OUTPATIENT
Start: 2024-07-02

## 2024-07-02 RX ORDER — DAPAGLIFLOZIN 10 MG/1
10 TABLET, FILM COATED ORAL
COMMUNITY
Start: 2024-02-14

## 2024-07-02 RX ORDER — LACTULOSE 10 G/15ML
SOLUTION ORAL; RECTAL
COMMUNITY
Start: 2024-06-24

## 2024-07-02 NOTE — PROGRESS NOTES
Subjective:       Patient ID: Philip Kam Sr. is a 78 y.o. male.    Chief Complaint: No chief complaint on file.    HPI  I saw this 78 y.o. who  developed decompensated alcohol related cirrhosis in April 2023. He came to the liver clinic with his wife. I last saw him in April 2024 and at that time admitted him to hospital with HE and YARITZA.    Used to drink 3-4 large whiskies per day  Stopped around April 2023 with the diagnosis of liver disease    Ascites/edema/trouble breathing  Managed by PCP with labs and paracentesis  - drained x3 - every 6 weeks- last time in 2023.    On low dose diuretics- furosemide 40 mg daily only    No recent paracentesis- has loose bowel motions not on lactulose.  - not onrifaximin    MELD 3.0: 26 at 4/25/2024  5:41 AM  MELD-Na: 24 at 4/25/2024  5:41 AM  Calculated from:  Serum Creatinine: 2.8 mg/dL at 4/25/2024  5:41 AM  Serum Sodium: 132 mmol/L at 4/25/2024  5:41 AM  Total Bilirubin: 1.8 mg/dL at 4/25/2024  5:41 AM  Serum Albumin: 3.2 g/dL at 4/25/2024  5:41 AM  INR(ratio): 1.3 at 4/24/2024  7:05 PM  Age at listing (hypothetical): 78 years  Sex: Male at 4/25/2024  5:41 AM       Abdo US: 1/9/24  Small volume abdominal ascites. Morphologic changes of cirrhosis.     Abdo CT: 3/14/23  1. Cirrhotic diminutive liver with nodular surface contour and moderate to large volume ascites. Mild body wall edema. Small left pleural effusion. Patchy bibasilar ground-glass opacities, pulmonary edema versus small airway process with air trapping/mosaic perfusion.  Mild splenomegaly, 14 cm.  2. No bowel obstruction.  No focal intra-abdominal inflammatory process.  3. Bilateral symmetric renal atrophy/cortical thinning.  No hydronephrosis.  4. Contracted gallbladder with hyperdense lumen, question inspissated sludge.  No discrete gallstone.  No evidence of gallbladder inflammation    EGD: 3/21/23  - Small to medium (< 5 mm) esophageal varices.                          Mainly near GEJ, flatten with  insufflation                          - Portal hypertensive gastropathy.                          - A single duodenal polyp. Resected and retrieved    2 D echo: 1/24/23  The left ventricle is normal in size with concentric remodeling and normal systolic function.  There are segmental left ventricular wall motion abnormalities.  Normal right ventricular size with normal right ventricular systolic function.  Severe left atrial enlargement.  Mild mitral regurgitation.  There is mild-to-moderate aortic valve stenosis.  Aortic valve area is 1.14 cm2; peak velocity is 2.46 m/s; mean gradient is 17 mmHg.  The estimated ejection fraction is 55%.  Normal left ventricular diastolic function.    PMH:  CHF/ A fib- failed cardioversion x2- off eloquis and on pradaxa  Hypertension  DM  Hypothyroidism      Review of Systems   Constitutional:  Negative for activity change, appetite change, chills, fatigue, fever and unexpected weight change.   HENT:  Negative for hearing loss.    Eyes:  Negative for discharge and visual disturbance.   Respiratory:  Negative for cough, chest tightness, shortness of breath and wheezing.    Cardiovascular:  Negative for chest pain, palpitations and leg swelling.   Gastrointestinal:  Negative for abdominal distention, abdominal pain, constipation, diarrhea and nausea.   Genitourinary:  Negative for dysuria and frequency.   Musculoskeletal:  Negative for arthralgias and back pain.   Skin:  Negative for pallor and rash.   Neurological:  Negative for dizziness, tremors, speech difficulty and headaches.   Hematological:  Negative for adenopathy.   Psychiatric/Behavioral:  Negative for agitation and confusion.          Lab Results   Component Value Date    ALT 25 06/27/2024    AST 49 (H) 06/27/2024    ALKPHOS 92 06/27/2024    BILITOT 1.9 (H) 06/27/2024     Past Medical History:   Diagnosis Date    Alcoholic cirrhosis of liver with ascites 7/7/2023    Anticoagulant long-term use     Anxiety     CHF  "(congestive heart failure)     High cholesterol     Hypertension     Hypothyroidism     Type 2 diabetes mellitus, uncontrolled      Past Surgical History:   Procedure Laterality Date    BACK SURGERY      COLONOSCOPY      COLONOSCOPY N/A 3/21/2023    Procedure: COLONOSCOPY;  Surgeon: Ronnie Hinton MD;  Location: [facility] ENDO;  Service: Endoscopy;  Laterality: N/A;    ESOPHAGOGASTRODUODENOSCOPY N/A 3/21/2023    Procedure: EGD (ESOPHAGOGASTRODUODENOSCOPY);  Surgeon: Ronnie Hinton MD;  Location: [facility] ENDO;  Service: Endoscopy;  Laterality: N/A;    ESOPHAGUS SURGERY  2012    EYE SURGERY      LUMBAR EPIDURAL INJECTION  01/2018    x2    TONSILLECTOMY      TREATMENT OF CARDIAC ARRHYTHMIA N/A 4/8/2021    Procedure: CARDIOVERSION;  Surgeon: Vinayak Eric MD;  Location: [facility] EP LAB;  Service: Cardiology;  Laterality: N/A;  a fib, dccv/hilario, mac, pr,, sscu    TREATMENT OF CARDIAC ARRHYTHMIA N/A 5/25/2021    Procedure: CARDIOVERSION;  Surgeon: Vinayak Eric MD;  Location: [facility] EP LAB;  Service: Cardiology;  Laterality: N/A;  AF, HILARIO (Cx if compliant), DCCV, MAC, OK, 3Prep     Current Outpatient Medications   Medication Sig    albuterol (PROVENTIL/VENTOLIN HFA) 90 mcg/actuation inhaler     azelastine (ASTELIN) 137 mcg (0.1 %) nasal spray 1 spray by Nasal route 2 (two) times daily.    dabigatran etexilate (PRADAXA) 150 mg Cap Take 1 capsule (150 mg total) by mouth 2 (two) times a day. "Do NOT break, chew, or open capsules."    FARXIGA 10 mg tablet Take 10 mg by mouth.    folic acid (FOLVITE) 400 MCG tablet Take 400 mcg by mouth once daily.    furosemide (LASIX) 40 MG tablet Take 1 tablet (40 mg total) by mouth 2 (two) times a day.    lactulose (CHRONULAC) 10 gram/15 mL solution SMARTSIG:Milliliter(s) By Mouth    levothyroxine (SYNTHROID) 100 MCG tablet Take 100 mcg by mouth once daily.     LORazepam (ATIVAN) 0.5 MG tablet Take 0.5 mg by mouth every 6 (six) hours as needed for Anxiety.    metoprolol succinate (TOPROL-XL) 50 MG " 24 hr tablet Take 1 tablet (50 mg total) by mouth once daily.    midodrine (PROAMATINE) 2.5 MG Tab Take 2.5 mg by mouth 3 (three) times daily.    pantoprazole (PROTONIX) 40 MG tablet Take 40 mg by mouth once daily.    simvastatin (ZOCOR) 40 MG tablet Take 40 mg by mouth every evening.    TOUJEO SOLOSTAR U-300 INSULIN 300 unit/mL (1.5 mL) InPn pen Inject into the skin.    tamsulosin (FLOMAX) 0.4 mg Cap Take 1 capsule by mouth.    XIFAXAN 550 mg Tab Take 1 tablet (550 mg total) by mouth 2 (two) times daily.     No current facility-administered medications for this visit.     Facility-Administered Medications Ordered in Other Visits   Medication    sodium chloride 0.9% bolus 1,000 mL       Objective:      Physical Exam  Constitutional:       Comments: Asterixis   HENT:      Head: Normocephalic.   Eyes:      Pupils: Pupils are equal, round, and reactive to light.   Neck:      Thyroid: No thyromegaly.   Cardiovascular:      Rate and Rhythm: Normal rate and regular rhythm.      Heart sounds: Normal heart sounds.   Pulmonary:      Effort: Pulmonary effort is normal.      Breath sounds: Normal breath sounds. No wheezing.   Abdominal:      General: There is distension.      Palpations: Abdomen is soft. There is no mass.      Tenderness: There is no abdominal tenderness.   Lymphadenopathy:      Cervical: No cervical adenopathy.   Skin:     General: Skin is warm.      Findings: No erythema or rash.   Neurological:      Mental Status: He is alert and oriented to person, place, and time.   Psychiatric:         Behavior: Behavior normal.           Assessment:       1. Hepatic coma/encephalopathy    2. Cirrhosis of liver with ascites, unspecified hepatic cirrhosis type    3. Ascites due to alcoholic cirrhosis    4. Type 2 diabetes mellitus with hyperglycemia, without long-term current use of insulin    5. Permanent atrial fibrillation    6. On anticoagulant therapy        Plan:   He has mildly decompensated alcohol related liver  disease and his main issue has been as ascites managed with infrequent paracentesis and edema managed with low-dose diuretics.      Since his hospital admission with HE and YARITZA in April 2024, he has been better with no recurrence of his HE. He feels stronger but appears to have developed a significant recurrence of his ascites.  - on lactulose and rifaximin but he is finding the rifaximin unaffordable and he is currently on samples from his local doctor.    1) Sent new prescription of rifaximin to [facility]  2) IR paracentesis at [place] every 2 weeks  3) US to screen for HCC    Clinic in 3 months.

## 2024-07-05 ENCOUNTER — TELEPHONE (OUTPATIENT)
Dept: INTERVENTIONAL RADIOLOGY/VASCULAR | Facility: HOSPITAL | Age: 79
End: 2024-07-05
Payer: MEDICARE

## 2024-07-05 NOTE — NURSING
Spoke to spouse... advised to arrive at 9:30, where to check in, no need to fast, take medications as usual, bring current list of home meds. Confirmed one allergy; actively taking Pradaxa.

## 2024-07-08 ENCOUNTER — HOSPITAL ENCOUNTER (OUTPATIENT)
Dept: INTERVENTIONAL RADIOLOGY/VASCULAR | Facility: HOSPITAL | Age: 79
Discharge: HOME | End: 2024-07-08
Attending: INTERNAL MEDICINE
Payer: MEDICARE

## 2024-07-08 ENCOUNTER — HOSPITAL ENCOUNTER (OUTPATIENT)
Dept: RADIOLOGY | Facility: HOSPITAL | Age: 79
Discharge: HOME | End: 2024-07-08
Attending: INTERNAL MEDICINE
Payer: MEDICARE

## 2024-07-08 VITALS
WEIGHT: 245 LBS | HEART RATE: 78 BPM | HEIGHT: 68 IN | DIASTOLIC BLOOD PRESSURE: 78 MMHG | OXYGEN SATURATION: 99 % | BODY MASS INDEX: 37.13 KG/M2 | SYSTOLIC BLOOD PRESSURE: 131 MMHG | RESPIRATION RATE: 16 BRPM | TEMPERATURE: 97 F

## 2024-07-08 DIAGNOSIS — K74.60 CIRRHOSIS OF LIVER WITH ASCITES, UNSPECIFIED HEPATIC CIRRHOSIS TYPE: ICD-10-CM

## 2024-07-08 DIAGNOSIS — R18.8 CIRRHOSIS OF LIVER WITH ASCITES, UNSPECIFIED HEPATIC CIRRHOSIS TYPE: ICD-10-CM

## 2024-07-08 DIAGNOSIS — K70.31 ASCITES DUE TO ALCOHOLIC CIRRHOSIS: ICD-10-CM

## 2024-07-08 LAB
APPEARANCE FLD: NORMAL
BODY FLD TYPE: NORMAL
COLOR FLD: YELLOW
LYMPHOCYTES NFR FLD MANUAL: 41 %
MONOS+MACROS NFR FLD MANUAL: 48 %
NEUTROPHILS NFR FLD MANUAL: 11 %
WBC # FLD: 227 /CU MM

## 2024-07-08 PROCEDURE — 63600175 PHARM REV CODE 636 W HCPCS: Mod: JZ,JG | Performed by: PHYSICIAN ASSISTANT

## 2024-07-08 PROCEDURE — 76705 ECHO EXAM OF ABDOMEN: CPT | Mod: TC

## 2024-07-08 PROCEDURE — P9047 ALBUMIN (HUMAN), 25%, 50ML: HCPCS | Mod: JZ,JG | Performed by: PHYSICIAN ASSISTANT

## 2024-07-08 PROCEDURE — 89051 BODY FLUID CELL COUNT: CPT | Performed by: INTERNAL MEDICINE

## 2024-07-08 PROCEDURE — 25000003 PHARM REV CODE 250: Performed by: PHYSICIAN ASSISTANT

## 2024-07-08 PROCEDURE — 49083 ABD PARACENTESIS W/IMAGING: CPT | Performed by: RADIOLOGY

## 2024-07-08 PROCEDURE — 76705 ECHO EXAM OF ABDOMEN: CPT | Mod: 26,,, | Performed by: RADIOLOGY

## 2024-07-08 PROCEDURE — 49083 ABD PARACENTESIS W/IMAGING: CPT | Mod: ,,, | Performed by: PHYSICIAN ASSISTANT

## 2024-07-08 RX ORDER — ALBUMIN HUMAN 250 G/1000ML
SOLUTION INTRAVENOUS
Status: COMPLETED | OUTPATIENT
Start: 2024-07-08 | End: 2024-07-08

## 2024-07-08 RX ORDER — LIDOCAINE HYDROCHLORIDE 10 MG/ML
INJECTION INFILTRATION; PERINEURAL
Status: COMPLETED | OUTPATIENT
Start: 2024-07-08 | End: 2024-07-08

## 2024-07-08 RX ADMIN — ALBUMIN (HUMAN) 12.5 G: 25 SOLUTION INTRAVENOUS at 11:07

## 2024-07-08 RX ADMIN — LIDOCAINE HYDROCHLORIDE 5 ML: 10 INJECTION, SOLUTION INFILTRATION; PERINEURAL at 10:07

## 2024-07-08 NOTE — DISCHARGE SUMMARY
Interventional Radiology Short Stay Discharge Summary      Admit Date: 7/8/2024  Discharge Date: 07/08/2024     Hospital Course: Uneventful    Discharge Diagnosis: ascites    Discharge Condition: Stable    Discharge Disposition: Home    Diet: Resume prior diet    Activity: activity as tolerated    Follow-up: With referring provider    Cindy Chava, PA-C [facility]

## 2024-07-08 NOTE — NURSING
Patient is discharged from IR. Following 62.5 gram Albumin infusion, IV is removed and the site is dressed. Patient is advised to leave dressing in place for at least 30 minutes. The AVS is printed and reviewed. Upcoming appointments and the [facility] number is highlighted for convenience. The opportunity to ask questions is provided. Patient is ambulatory from the unit and taken to the [facility] waiting room as requested by Yasmine in Ultrasound for his next appointment.

## 2024-07-08 NOTE — H&P
"Radiology History & Physical      SUBJECTIVE:     Chief Complaint: abdominal distention    History of Present Illness:  Philip Kam Sr. is a 78 y.o. male who presents for ultrasound guided paracentesis  Past Medical History:   Diagnosis Date    Alcoholic cirrhosis of liver with ascites 7/7/2023    Anticoagulant long-term use     Anxiety     CHF (congestive heart failure)     High cholesterol     Hypertension     Hypothyroidism     Type 2 diabetes mellitus, uncontrolled      Past Surgical History:   Procedure Laterality Date    BACK SURGERY      COLONOSCOPY      COLONOSCOPY N/A 3/21/2023    Procedure: COLONOSCOPY;  Surgeon: Ronnie Hinton MD;  Location: [facility] ENDO;  Service: Endoscopy;  Laterality: N/A;    ESOPHAGOGASTRODUODENOSCOPY N/A 3/21/2023    Procedure: EGD (ESOPHAGOGASTRODUODENOSCOPY);  Surgeon: Ronnie Hinton MD;  Location: [facility] ENDO;  Service: Endoscopy;  Laterality: N/A;    ESOPHAGUS SURGERY  2012    EYE SURGERY      LUMBAR EPIDURAL INJECTION  01/2018    x2    TONSILLECTOMY      TREATMENT OF CARDIAC ARRHYTHMIA N/A 4/8/2021    Procedure: CARDIOVERSION;  Surgeon: Vinayak Eric MD;  Location: [facility] EP LAB;  Service: Cardiology;  Laterality: N/A;  a fib, dccv/hilario, mac, pr,, sscu    TREATMENT OF CARDIAC ARRHYTHMIA N/A 5/25/2021    Procedure: CARDIOVERSION;  Surgeon: Vinayak Eric MD;  Location: [facility] EP LAB;  Service: Cardiology;  Laterality: N/A;  AF, HILARIO (Cx if compliant), DCCV, MAC, RI, 3Prep       Home Meds:   Prior to Admission medications    Medication Sig Start Date End Date Taking? Authorizing Provider   albuterol (PROVENTIL/VENTOLIN HFA) 90 mcg/actuation inhaler  1/16/21  Yes Provider, Historical   azelastine (ASTELIN) 137 mcg (0.1 %) nasal spray 1 spray by Nasal route 2 (two) times daily.   Yes Provider, Historical   dabigatran etexilate (PRADAXA) 150 mg Cap Take 1 capsule (150 mg total) by mouth 2 (two) times a day. "Do NOT break, chew, or open capsules." 10/10/23  Yes Vinayak Eric MD "   FARXIGA 10 mg tablet Take 10 mg by mouth. 2/14/24  Yes Provider, Historical   folic acid (FOLVITE) 400 MCG tablet Take 400 mcg by mouth once daily.   Yes Provider, Historical   furosemide (LASIX) 40 MG tablet Take 1 tablet (40 mg total) by mouth 2 (two) times a day. 3/14/21  Yes Katlin Modi MD   lactulose (CHRONULAC) 10 gram/15 mL solution SMARTSIG:Milliliter(s) By Mouth 6/24/24  Yes Provider, Historical   levothyroxine (SYNTHROID) 100 MCG tablet Take 100 mcg by mouth once daily.  9/19/17  Yes Provider, Historical   metoprolol succinate (TOPROL-XL) 50 MG 24 hr tablet Take 1 tablet (50 mg total) by mouth once daily. 10/10/23  Yes Keaton Mohr MD   midodrine (PROAMATINE) 2.5 MG Tab Take 2.5 mg by mouth 3 (three) times daily.   Yes Provider, Historical   pantoprazole (PROTONIX) 40 MG tablet Take 40 mg by mouth once daily.   Yes Provider, Historical   simvastatin (ZOCOR) 40 MG tablet Take 40 mg by mouth every evening.   Yes Provider, Historical   tamsulosin (FLOMAX) 0.4 mg Cap Take 1 capsule by mouth. 6/25/24  Yes Provider, Historical   XIFAXAN 550 mg Tab Take 1 tablet (550 mg total) by mouth 2 (two) times daily. 7/2/24  Yes Valeriy Escobedo MD   LORazepam (ATIVAN) 0.5 MG tablet Take 0.5 mg by mouth every 6 (six) hours as needed for Anxiety.    Provider, Historical   TOUJEO SOLOSTAR U-300 INSULIN 300 unit/mL (1.5 mL) InPn pen Inject into the skin. 6/17/24   Provider, Historical     Anticoagulants/Antiplatelets: no anticoagulation    Allergies:   Review of patient's allergies indicates:   Allergen Reactions    Codeine Itching     Sedation History:  no adverse reactions    Review of Systems:   Hematological: no known coagulopathies  Respiratory: no shortness of breath  Cardiovascular: no chest pain  Gastrointestinal: no abdominal pain  Genito-Urinary: no dysuria  Musculoskeletal: negative  Neurological: no TIA or stroke symptoms         OBJECTIVE:     Vital Signs (Most Recent)  Temp: 97.2 °F (36.2 °C)  (07/08/24 0946)  Pulse: 97 (07/08/24 0946)  Resp: 16 (07/08/24 0946)  BP: 134/79 (07/08/24 0946)  SpO2: 99 % (07/08/24 0946)    Physical Exam:  ASA: 2  Mallampati: n/a    General: no acute distress  Mental Status: alert and oriented to person, place and time  HEENT: normocephalic, atraumatic  Chest: unlabored breathing  Heart: regular heart rate  Abdomen: distended  Extremity: moves all extremities    ASSESSMENT/PLAN:     Sedation Plan: local  Patient will undergo ultrasound guided paracentesis.    Olesya Walters PA-C

## 2024-07-08 NOTE — SEDATION DOCUMENTATION
IR Procedure - Paracentesis - is completed. Patient tolerated well. He/She is awake, alert, oriented. Vital signs are stable. 9100 mL cloudy, yellow ascites is drained. 62.5 grams Albumin infused post-procedure. Patient will return to IR pre/post to complete discharge.

## 2024-07-08 NOTE — PROCEDURES
Radiology Post-Procedure Note    Pre Op Diagnosis: Ascites  Post Op Diagnosis: Same    Procedure: Ultrasound Guided Paracentesis    Procedure performed by: Olesya Walters PA-C    Written Informed Consent Obtained: Yes  Specimen Removed: YES   Estimated Blood Loss: Minimal    Findings:   Successful paracentesis.  9100 ml cloudy yellow fluid removed from RLQ.  Albumin was administered PRN per protocol.    Patient tolerated procedure well.    Olesya Walters PA-C

## 2024-07-18 ENCOUNTER — TELEPHONE (OUTPATIENT)
Dept: HEPATOLOGY | Facility: CLINIC | Age: 79
End: 2024-07-18
Payer: MEDICARE

## 2024-07-18 NOTE — TELEPHONE ENCOUNTER
"rec'd pc from pt's wife wanting to know if pt needs ailin every two weeks as Dr. Escobedo last office note stated. Wife states pt is not "filling up that fast" to warrant every two weeks. Wife would like to do PRN. Express to wife the importance of not waiting too long to call for an appt when pt is in fluid overload in fear of nothing being available. She verbalized understanding. Ok to do Ailin PRN per Dr. Escobedo via secure chat. MARINA KHAN  "

## 2024-07-18 NOTE — TELEPHONE ENCOUNTER
----- Message from Raven Cruz sent at 7/18/2024 11:32 AM CDT -----  Regarding: Consult/Advisory  Contact: Merna betancourt     Consult/Advisory     Name Of Caller:Merna betancourt        Contact Preference:[phone]     Nature of call:Patients wife is calling to find out info on  PARACENTESIS . Requesting a call back

## 2024-07-23 ENCOUNTER — TELEPHONE (OUTPATIENT)
Dept: ELECTROPHYSIOLOGY | Facility: CLINIC | Age: 79
End: 2024-07-23
Payer: MEDICARE

## 2024-07-23 NOTE — TELEPHONE ENCOUNTER
----- Message from Gregor Lu sent at 7/23/2024 10:51 AM CDT -----  Type:  Patient Returning Call    Who Called:PT  Who Left Message for Patient:  Does the patient know what this is regarding?:APPT 7/23  Would the patient rather a call back or a response via [facility]? CALL  Best Call Back Number:  [phone]  Additional Information: Pt states they would like a call to see if appt scheduled for today was a mistake. Pt state they were looking at the new pt appt and would like a call back to discuss. Thank you

## 2024-07-24 ENCOUNTER — TELEPHONE (OUTPATIENT)
Dept: ELECTROPHYSIOLOGY | Facility: CLINIC | Age: 79
End: 2024-07-24
Payer: MEDICARE

## 2024-07-29 PROBLEM — N17.9 AKI (ACUTE KIDNEY INJURY): Status: RESOLVED | Noted: 2024-04-25 | Resolved: 2024-07-29

## 2024-08-02 ENCOUNTER — TELEPHONE (OUTPATIENT)
Dept: INTERVENTIONAL RADIOLOGY/VASCULAR | Facility: HOSPITAL | Age: 79
End: 2024-08-02
Payer: MEDICARE

## 2024-08-05 ENCOUNTER — HOSPITAL ENCOUNTER (OUTPATIENT)
Dept: INTERVENTIONAL RADIOLOGY/VASCULAR | Facility: HOSPITAL | Age: 79
Discharge: HOME | End: 2024-08-05
Attending: INTERNAL MEDICINE
Payer: MEDICARE

## 2024-08-05 VITALS
DIASTOLIC BLOOD PRESSURE: 76 MMHG | RESPIRATION RATE: 20 BRPM | BODY MASS INDEX: 37.13 KG/M2 | WEIGHT: 245 LBS | TEMPERATURE: 98 F | SYSTOLIC BLOOD PRESSURE: 125 MMHG | HEART RATE: 79 BPM | HEIGHT: 68 IN | OXYGEN SATURATION: 100 %

## 2024-08-05 DIAGNOSIS — K70.31 ASCITES DUE TO ALCOHOLIC CIRRHOSIS: ICD-10-CM

## 2024-08-05 DIAGNOSIS — R18.8 ASCITES: ICD-10-CM

## 2024-08-05 LAB
APPEARANCE FLD: NORMAL
BODY FLD TYPE: NORMAL
COLOR FLD: YELLOW
LYMPHOCYTES NFR FLD MANUAL: 64 %
MONOS+MACROS NFR FLD MANUAL: 25 %
NEUTROPHILS NFR FLD MANUAL: 11 %
WBC # FLD: 196 /CU MM

## 2024-08-05 PROCEDURE — 89051 BODY FLUID CELL COUNT: CPT | Performed by: INTERNAL MEDICINE

## 2024-08-05 PROCEDURE — 25000003 PHARM REV CODE 250: Performed by: PHYSICIAN ASSISTANT

## 2024-08-05 PROCEDURE — 49083 ABD PARACENTESIS W/IMAGING: CPT | Mod: ,,, | Performed by: RADIOLOGY

## 2024-08-05 PROCEDURE — 49083 ABD PARACENTESIS W/IMAGING: CPT | Performed by: RADIOLOGY

## 2024-08-05 PROCEDURE — 63600175 PHARM REV CODE 636 W HCPCS: Mod: JG | Performed by: PHYSICIAN ASSISTANT

## 2024-08-05 PROCEDURE — P9047 ALBUMIN (HUMAN), 25%, 50ML: HCPCS | Mod: JG | Performed by: PHYSICIAN ASSISTANT

## 2024-08-05 RX ORDER — ALBUMIN HUMAN 250 G/1000ML
SOLUTION INTRAVENOUS
Status: COMPLETED | OUTPATIENT
Start: 2024-08-05 | End: 2024-08-05

## 2024-08-05 RX ORDER — LIDOCAINE HYDROCHLORIDE 10 MG/ML
INJECTION, SOLUTION INFILTRATION; PERINEURAL
Status: COMPLETED | OUTPATIENT
Start: 2024-08-05 | End: 2024-08-05

## 2024-08-05 RX ADMIN — ALBUMIN (HUMAN) 62.5 G: 12.5 SOLUTION INTRAVENOUS at 03:08

## 2024-08-05 RX ADMIN — LIDOCAINE HYDROCHLORIDE 5 ML: 10 INJECTION, SOLUTION INFILTRATION; PERINEURAL at 03:08

## 2024-08-22 ENCOUNTER — TELEPHONE (OUTPATIENT)
Dept: ELECTROPHYSIOLOGY | Facility: CLINIC | Age: 79
End: 2024-08-22
Payer: MEDICARE

## 2024-09-04 ENCOUNTER — TELEPHONE (OUTPATIENT)
Dept: INTERVENTIONAL RADIOLOGY/VASCULAR | Facility: CLINIC | Age: 79
End: 2024-09-04
Payer: MEDICARE

## 2024-09-04 ENCOUNTER — TELEPHONE (OUTPATIENT)
Dept: INTERVENTIONAL RADIOLOGY/VASCULAR | Facility: HOSPITAL | Age: 79
End: 2024-09-04
Payer: MEDICARE

## 2024-09-05 ENCOUNTER — HOSPITAL ENCOUNTER (OUTPATIENT)
Dept: INTERVENTIONAL RADIOLOGY/VASCULAR | Facility: HOSPITAL | Age: 79
Discharge: HOME | End: 2024-09-05
Attending: INTERNAL MEDICINE
Payer: MEDICARE

## 2024-09-05 VITALS
BODY MASS INDEX: 37.13 KG/M2 | SYSTOLIC BLOOD PRESSURE: 114 MMHG | DIASTOLIC BLOOD PRESSURE: 87 MMHG | HEART RATE: 78 BPM | HEIGHT: 68 IN | OXYGEN SATURATION: 99 % | WEIGHT: 245 LBS | RESPIRATION RATE: 18 BRPM

## 2024-09-05 DIAGNOSIS — R18.8 ASCITES: ICD-10-CM

## 2024-09-05 DIAGNOSIS — K70.31 ASCITES DUE TO ALCOHOLIC CIRRHOSIS: ICD-10-CM

## 2024-09-05 LAB
APPEARANCE FLD: NORMAL
BODY FLD TYPE: NORMAL
COLOR FLD: YELLOW
LYMPHOCYTES NFR FLD MANUAL: 81 %
MONOS+MACROS NFR FLD MANUAL: 6 %
NEUTROPHILS NFR FLD MANUAL: 13 %
WBC # FLD: 573 /CU MM

## 2024-09-05 PROCEDURE — 63600175 PHARM REV CODE 636 W HCPCS: Mod: JZ,JG | Performed by: PHYSICIAN ASSISTANT

## 2024-09-05 PROCEDURE — 25000003 PHARM REV CODE 250: Performed by: PHYSICIAN ASSISTANT

## 2024-09-05 PROCEDURE — 89051 BODY FLUID CELL COUNT: CPT | Performed by: INTERNAL MEDICINE

## 2024-09-05 PROCEDURE — P9047 ALBUMIN (HUMAN), 25%, 50ML: HCPCS | Mod: JZ,JG | Performed by: PHYSICIAN ASSISTANT

## 2024-09-05 RX ORDER — ALBUMIN HUMAN 250 G/1000ML
SOLUTION INTRAVENOUS
Status: COMPLETED | OUTPATIENT
Start: 2024-09-05 | End: 2024-09-05

## 2024-09-05 RX ORDER — LIDOCAINE HYDROCHLORIDE 10 MG/ML
INJECTION, SOLUTION INFILTRATION; PERINEURAL
Status: COMPLETED | OUTPATIENT
Start: 2024-09-05 | End: 2024-09-05

## 2024-09-05 RX ADMIN — LIDOCAINE HYDROCHLORIDE 5 ML: 10 INJECTION, SOLUTION INFILTRATION; PERINEURAL at 03:09

## 2024-09-05 RX ADMIN — ALBUMIN (HUMAN) 50 G: 25 SOLUTION INTRAVENOUS at 03:09

## 2024-09-05 NOTE — H&P
"Radiology History & Physical      SUBJECTIVE:     Chief Complaint: abdominal distention    History of Present Illness:  Philip Kam Sr. is a 78 y.o. male who presents for ultrasound guided paracentesis  Past Medical History:   Diagnosis Date    Alcoholic cirrhosis of liver with ascites 7/7/2023    Anticoagulant long-term use     Anxiety     CHF (congestive heart failure)     High cholesterol     Hypertension     Hypothyroidism     Type 2 diabetes mellitus, uncontrolled      Past Surgical History:   Procedure Laterality Date    BACK SURGERY      COLONOSCOPY      COLONOSCOPY N/A 3/21/2023    Procedure: COLONOSCOPY;  Surgeon: Ronnie Hinton MD;  Location: [facility] ENDO;  Service: Endoscopy;  Laterality: N/A;    ESOPHAGOGASTRODUODENOSCOPY N/A 3/21/2023    Procedure: EGD (ESOPHAGOGASTRODUODENOSCOPY);  Surgeon: Ronnie Hinton MD;  Location: [facility] ENDO;  Service: Endoscopy;  Laterality: N/A;    ESOPHAGUS SURGERY  2012    EYE SURGERY      LUMBAR EPIDURAL INJECTION  01/2018    x2    TONSILLECTOMY      TREATMENT OF CARDIAC ARRHYTHMIA N/A 4/8/2021    Procedure: CARDIOVERSION;  Surgeon: Vinayak Eric MD;  Location: [facility] EP LAB;  Service: Cardiology;  Laterality: N/A;  a fib, dccv/hilario, mac, pr,, sscu    TREATMENT OF CARDIAC ARRHYTHMIA N/A 5/25/2021    Procedure: CARDIOVERSION;  Surgeon: Vinayak Eric MD;  Location: [facility] EP LAB;  Service: Cardiology;  Laterality: N/A;  AF, HILARIO (Cx if compliant), DCCV, MAC, PA, 3Prep       Home Meds:   Prior to Admission medications    Medication Sig Start Date End Date Taking? Authorizing Provider   albuterol (PROVENTIL/VENTOLIN HFA) 90 mcg/actuation inhaler  1/16/21   Provider, Historical   azelastine (ASTELIN) 137 mcg (0.1 %) nasal spray 1 spray by Nasal route 2 (two) times daily.    Provider, Historical   dabigatran etexilate (PRADAXA) 150 mg Cap Take 1 capsule (150 mg total) by mouth 2 (two) times a day. "Do NOT break, chew, or open capsules." 10/10/23   Vinayak Eric MD FARXIGA " 10 mg tablet Take 10 mg by mouth. 2/14/24   Provider, Historical   folic acid (FOLVITE) 400 MCG tablet Take 400 mcg by mouth once daily.    Provider, Historical   furosemide (LASIX) 40 MG tablet Take 1 tablet (40 mg total) by mouth 2 (two) times a day. 3/14/21   Katlin Modi MD   lactulose (CHRONULAC) 10 gram/15 mL solution SMARTSIG:Milliliter(s) By Mouth 6/24/24   Provider, Historical   levothyroxine (SYNTHROID) 100 MCG tablet Take 100 mcg by mouth once daily.  9/19/17   Provider, Historical   LORazepam (ATIVAN) 0.5 MG tablet Take 0.5 mg by mouth every 6 (six) hours as needed for Anxiety.    Provider, Historical   metoprolol succinate (TOPROL-XL) 50 MG 24 hr tablet Take 1 tablet (50 mg total) by mouth once daily. 10/10/23   Keaton Mohr MD   midodrine (PROAMATINE) 2.5 MG Tab Take 2.5 mg by mouth 3 (three) times daily.    Provider, Historical   pantoprazole (PROTONIX) 40 MG tablet Take 40 mg by mouth once daily.    Provider, Historical   simvastatin (ZOCOR) 40 MG tablet Take 40 mg by mouth every evening.    Provider, Historical   tamsulosin (FLOMAX) 0.4 mg Cap Take 1 capsule by mouth. 6/25/24   Provider, Historical   TOUJEO SOLOSTAR U-300 INSULIN 300 unit/mL (1.5 mL) InPn pen Inject into the skin. 6/17/24   Provider, Historical   XIFAXAN 550 mg Tab Take 1 tablet (550 mg total) by mouth 2 (two) times daily. 7/2/24   Valeriy Escobedo MD     Anticoagulants/Antiplatelets: no anticoagulation    Allergies:   Review of patient's allergies indicates:   Allergen Reactions    Codeine Itching     Sedation History:  no adverse reactions    Review of Systems:   Hematological: no known coagulopathies  Respiratory: no shortness of breath  Cardiovascular: no chest pain  Gastrointestinal: no abdominal pain  Genito-Urinary: no dysuria  Musculoskeletal: negative  Neurological: no TIA or stroke symptoms         OBJECTIVE:     Vital Signs (Most Recent)       Physical Exam:  ASA: 2  Mallampati: n/a    General: no acute  distress  Mental Status: alert and oriented to person, place and time  HEENT: normocephalic, atraumatic  Chest: unlabored breathing  Heart: regular heart rate  Abdomen: distended  Extremity: moves all extremities    ASSESSMENT/PLAN:     Sedation Plan: local  Patient will undergo ultrasound guided paracentesis.    Olesya Walters PA-C

## 2024-09-05 NOTE — SEDATION DOCUMENTATION
Procedure completed. Patient tolerated well; VSS. Site CDI. 7600 mls of cloudy yellow ascities removed. Albumin replaced per protocol. No recovery time needed. Ok for discharge.

## 2024-09-05 NOTE — PROCEDURES
Radiology Post-Procedure Note    Pre Op Diagnosis: Ascites  Post Op Diagnosis: Same    Procedure: Ultrasound Guided Paracentesis    Procedure performed by: Olesya Walters PA-C    Written Informed Consent Obtained: Yes  Specimen Removed: YES   Estimated Blood Loss: Minimal    Findings:   Successful paracentesis.  7600 ml cloudy yellow fluid.  Albumin was administered PRN per protocol.    Patient tolerated procedure well.    Olesya Walters PA-C

## 2024-09-05 NOTE — DISCHARGE SUMMARY
Interventional Radiology Short Stay Discharge Summary      Admit Date: 9/5/2024  Discharge Date: 09/05/2024     Hospital Course: Uneventful    Discharge Diagnosis: ascites    Discharge Condition: Stable    Discharge Disposition: Home    Diet: Resume prior diet    Activity: activity as tolerated    Follow-up: With referring provider    Cindy Chava, PA-C [facility]

## 2024-09-23 ENCOUNTER — TELEPHONE (OUTPATIENT)
Dept: INTERVENTIONAL RADIOLOGY/VASCULAR | Facility: HOSPITAL | Age: 79
End: 2024-09-23
Payer: MEDICARE

## 2024-09-23 NOTE — NURSING
To Merna: Advised to arrive at 9:30, where to check in, no need to fast, may drive self, take medication as usual, bring list of current home meds.

## 2024-09-24 ENCOUNTER — HOSPITAL ENCOUNTER (OUTPATIENT)
Dept: INTERVENTIONAL RADIOLOGY/VASCULAR | Facility: HOSPITAL | Age: 79
Discharge: HOME | End: 2024-09-24
Attending: INTERNAL MEDICINE
Payer: MEDICARE

## 2024-09-24 VITALS
OXYGEN SATURATION: 97 % | BODY MASS INDEX: 39.4 KG/M2 | HEART RATE: 83 BPM | SYSTOLIC BLOOD PRESSURE: 106 MMHG | RESPIRATION RATE: 16 BRPM | TEMPERATURE: 97 F | WEIGHT: 260 LBS | HEIGHT: 68 IN | DIASTOLIC BLOOD PRESSURE: 60 MMHG

## 2024-09-24 DIAGNOSIS — R18.8 ASCITES: ICD-10-CM

## 2024-09-24 DIAGNOSIS — K70.31 ASCITES DUE TO ALCOHOLIC CIRRHOSIS: ICD-10-CM

## 2024-09-24 LAB
APPEARANCE FLD: NORMAL
BODY FLD TYPE: NORMAL
COLOR FLD: YELLOW
LYMPHOCYTES NFR FLD MANUAL: 72 %
MESOTHL CELL NFR FLD MANUAL: 1 %
MONOS+MACROS NFR FLD MANUAL: 15 %
NEUTROPHILS NFR FLD MANUAL: 12 %
WBC # FLD: 148 /CU MM

## 2024-09-24 PROCEDURE — P9047 ALBUMIN (HUMAN), 25%, 50ML: HCPCS | Mod: JG | Performed by: PHYSICIAN ASSISTANT

## 2024-09-24 PROCEDURE — 89051 BODY FLUID CELL COUNT: CPT | Performed by: INTERNAL MEDICINE

## 2024-09-24 PROCEDURE — 49083 ABD PARACENTESIS W/IMAGING: CPT | Performed by: RADIOLOGY

## 2024-09-24 PROCEDURE — 63600175 PHARM REV CODE 636 W HCPCS: Mod: JG | Performed by: PHYSICIAN ASSISTANT

## 2024-09-24 PROCEDURE — 25000003 PHARM REV CODE 250: Performed by: PHYSICIAN ASSISTANT

## 2024-09-24 PROCEDURE — 49083 ABD PARACENTESIS W/IMAGING: CPT | Mod: ,,, | Performed by: PHYSICIAN ASSISTANT

## 2024-09-24 RX ORDER — ALBUMIN HUMAN 250 G/1000ML
SOLUTION INTRAVENOUS
Status: COMPLETED | OUTPATIENT
Start: 2024-09-24 | End: 2024-09-24

## 2024-09-24 RX ORDER — LIDOCAINE HYDROCHLORIDE 10 MG/ML
INJECTION, SOLUTION INFILTRATION; PERINEURAL
Status: COMPLETED | OUTPATIENT
Start: 2024-09-24 | End: 2024-09-24

## 2024-09-24 RX ADMIN — LIDOCAINE HYDROCHLORIDE 3 ML: 10 INJECTION, SOLUTION INFILTRATION; PERINEURAL at 10:09

## 2024-09-24 RX ADMIN — ALBUMIN (HUMAN) 62.5 G: 12.5 SOLUTION INTRAVENOUS at 11:09

## 2024-09-24 NOTE — DISCHARGE SUMMARY
Interventional Radiology Short Stay Discharge Summary      Admit Date: 9/24/2024  Discharge Date: 09/24/2024     Hospital Course: Uneventful    Discharge Diagnosis: ascites    Discharge Condition: Stable    Discharge Disposition: Home    Diet: Resume prior diet    Activity: activity as tolerated    Follow-up: With referring provider    Cindy Chava, PA-C [facility]

## 2024-09-24 NOTE — SEDATION DOCUMENTATION
Pt arrived to C ARM for paracentesis. Pt oriented to unit and staff. Plan of care reviewed with patient, patient verbalizes understanding. Comfort measures utilized. Pt safely transferred from stretcher to procedural table. Fall risk reviewed with patient, fall risk interventions maintained. Blankets applied. Pt prepped and draped utilizing standard sterile technique. Patient placed on continuous monitoring, as required by sedation policy. Timeouts completed utilizing standard universal time-out, per department and facility policy. RN to remain at bedside, continuous monitoring maintained. Pt resting comfortably. Denies pain/discomfort. Will continue to monitor. See flow sheets for monitoring, medication administration, and updates.

## 2024-09-24 NOTE — H&P
"Radiology History & Physical      SUBJECTIVE:     Chief Complaint: abdominal distention    History of Present Illness:  Philip Kam Sr. is a 78 y.o. male who presents for ultrasound guided paracentesis  Past Medical History:   Diagnosis Date    Alcoholic cirrhosis of liver with ascites 7/7/2023    Anticoagulant long-term use     Anxiety     CHF (congestive heart failure)     High cholesterol     Hypertension     Hypothyroidism     Type 2 diabetes mellitus, uncontrolled      Past Surgical History:   Procedure Laterality Date    BACK SURGERY      COLONOSCOPY      COLONOSCOPY N/A 3/21/2023    Procedure: COLONOSCOPY;  Surgeon: Ronnie Hinton MD;  Location: [facility] ENDO;  Service: Endoscopy;  Laterality: N/A;    ESOPHAGOGASTRODUODENOSCOPY N/A 3/21/2023    Procedure: EGD (ESOPHAGOGASTRODUODENOSCOPY);  Surgeon: Ronnie Hinton MD;  Location: [facility] ENDO;  Service: Endoscopy;  Laterality: N/A;    ESOPHAGUS SURGERY  2012    EYE SURGERY      LUMBAR EPIDURAL INJECTION  01/2018    x2    TONSILLECTOMY      TREATMENT OF CARDIAC ARRHYTHMIA N/A 4/8/2021    Procedure: CARDIOVERSION;  Surgeon: Vinayak Eric MD;  Location: [facility] EP LAB;  Service: Cardiology;  Laterality: N/A;  a fib, dccv/hilario, mac, pr,, sscu    TREATMENT OF CARDIAC ARRHYTHMIA N/A 5/25/2021    Procedure: CARDIOVERSION;  Surgeon: Vinayak Eric MD;  Location: [facility] EP LAB;  Service: Cardiology;  Laterality: N/A;  AF, HILARIO (Cx if compliant), DCCV, MAC, MS, 3Prep       Home Meds:   Prior to Admission medications    Medication Sig Start Date End Date Taking? Authorizing Provider   albuterol (PROVENTIL/VENTOLIN HFA) 90 mcg/actuation inhaler  1/16/21   Provider, Historical   azelastine (ASTELIN) 137 mcg (0.1 %) nasal spray 1 spray by Nasal route 2 (two) times daily.    Provider, Historical   dabigatran etexilate (PRADAXA) 150 mg Cap Take 1 capsule (150 mg total) by mouth 2 (two) times a day. "Do NOT break, chew, or open capsules." 10/10/23   Vinayak Eric MD FARXIGA " 10 mg tablet Take 10 mg by mouth. 2/14/24   Provider, Historical   folic acid (FOLVITE) 400 MCG tablet Take 400 mcg by mouth once daily.    Provider, Historical   furosemide (LASIX) 40 MG tablet Take 1 tablet (40 mg total) by mouth 2 (two) times a day. 3/14/21   Katlin Modi MD   lactulose (CHRONULAC) 10 gram/15 mL solution SMARTSIG:Milliliter(s) By Mouth 6/24/24   Provider, Historical   levothyroxine (SYNTHROID) 100 MCG tablet Take 100 mcg by mouth once daily.  9/19/17   Provider, Historical   LORazepam (ATIVAN) 0.5 MG tablet Take 0.5 mg by mouth every 6 (six) hours as needed for Anxiety.    Provider, Historical   metoprolol succinate (TOPROL-XL) 50 MG 24 hr tablet Take 1 tablet (50 mg total) by mouth once daily. 10/10/23   Keaton Mohr MD   midodrine (PROAMATINE) 2.5 MG Tab Take 2.5 mg by mouth 3 (three) times daily.    Provider, Historical   pantoprazole (PROTONIX) 40 MG tablet Take 40 mg by mouth once daily.    Provider, Historical   simvastatin (ZOCOR) 40 MG tablet Take 40 mg by mouth every evening.    Provider, Historical   tamsulosin (FLOMAX) 0.4 mg Cap Take 1 capsule by mouth. 6/25/24   Provider, Historical   TOUJEO SOLOSTAR U-300 INSULIN 300 unit/mL (1.5 mL) InPn pen Inject into the skin. 6/17/24   Provider, Historical   XIFAXAN 550 mg Tab Take 1 tablet (550 mg total) by mouth 2 (two) times daily. 7/2/24   Valeriy Escobedo MD     Anticoagulants/Antiplatelets: no anticoagulation    Allergies:   Review of patient's allergies indicates:   Allergen Reactions    Codeine Itching     Sedation History:  no adverse reactions    Review of Systems:   Hematological: no known coagulopathies  Respiratory: no shortness of breath  Cardiovascular: no chest pain  Gastrointestinal: no abdominal pain  Genito-Urinary: no dysuria  Musculoskeletal: negative  Neurological: no TIA or stroke symptoms         OBJECTIVE:     Vital Signs (Most Recent)       Physical Exam:  ASA: 2  Mallampati: n/a    General: no acute  distress  Mental Status: alert and oriented to person, place and time  HEENT: normocephalic, atraumatic  Chest: unlabored breathing  Heart: regular heart rate  Abdomen: distended  Extremity: moves all extremities    ASSESSMENT/PLAN:     Sedation Plan: local  Patient will undergo ultrasound guided paracentesis.    Olesya Walters PA-C

## 2024-09-24 NOTE — SEDATION DOCUMENTATION
Procedure completed. Patient tolerated well; VSS. Site CDI. Patient to be given albumin and then discharged per orders.

## 2024-09-24 NOTE — PROCEDURES
Radiology Post-Procedure Note    Pre Op Diagnosis: Ascites  Post Op Diagnosis: Same    Procedure: Ultrasound Guided Paracentesis    Procedure performed by: Olesya Walters PA-C    Written Informed Consent Obtained: Yes  Specimen Removed: YES   Estimated Blood Loss: Minimal    Findings:   Successful paracentesis.  10.25 L cloudy yellow from LLQ.  Albumin was administered PRN per protocol.    Patient tolerated procedure well.    Olesya Walters PA-C

## 2024-09-25 ENCOUNTER — HOSPITAL ENCOUNTER (INPATIENT)
Facility: HOSPITAL | Age: 79
LOS: 2 days | Discharge: HOME HEALTH | DRG: 442 | End: 2024-09-27
Attending: STUDENT IN AN ORGANIZED HEALTH CARE EDUCATION/TRAINING PROGRAM | Admitting: STUDENT IN AN ORGANIZED HEALTH CARE EDUCATION/TRAINING PROGRAM
Payer: MEDICARE

## 2024-09-25 DIAGNOSIS — I50.32 CHRONIC DIASTOLIC HEART FAILURE: ICD-10-CM

## 2024-09-25 DIAGNOSIS — I48.19 PERSISTENT ATRIAL FIBRILLATION: ICD-10-CM

## 2024-09-25 DIAGNOSIS — K72.91 HEPATIC COMA/ENCEPHALOPATHY: ICD-10-CM

## 2024-09-25 DIAGNOSIS — I48.91 ATRIAL FIBRILLATION, UNSPECIFIED TYPE: ICD-10-CM

## 2024-09-25 DIAGNOSIS — R41.82 AMS (ALTERED MENTAL STATUS): ICD-10-CM

## 2024-09-25 DIAGNOSIS — K76.82 HEPATIC ENCEPHALOPATHY: ICD-10-CM

## 2024-09-25 DIAGNOSIS — N17.9 AKI (ACUTE KIDNEY INJURY): ICD-10-CM

## 2024-09-25 DIAGNOSIS — K70.31 ALCOHOLIC CIRRHOSIS OF LIVER WITH ASCITES: ICD-10-CM

## 2024-09-25 DIAGNOSIS — R53.81 DEBILITY: Primary | ICD-10-CM

## 2024-09-25 DIAGNOSIS — R07.9 CHEST PAIN: ICD-10-CM

## 2024-09-25 PROBLEM — E78.5 HYPERLIPIDEMIA: Status: ACTIVE | Noted: 2018-03-09

## 2024-09-25 PROBLEM — N18.9 CKD (CHRONIC KIDNEY DISEASE): Status: ACTIVE | Noted: 2024-09-25

## 2024-09-25 LAB
ALBUMIN SERPL BCP-MCNC: 3 G/DL (ref 3.5–5.2)
ALP SERPL-CCNC: 121 U/L (ref 55–135)
ALT SERPL W/O P-5'-P-CCNC: 20 U/L (ref 10–44)
AMMONIA PLAS-SCNC: 148 UMOL/L (ref 10–50)
ANION GAP SERPL CALC-SCNC: 11 MMOL/L (ref 8–16)
APTT PPP: 44.3 SEC (ref 21–32)
AST SERPL-CCNC: 39 U/L (ref 10–40)
BASOPHILS # BLD AUTO: 0.06 K/UL (ref 0–0.2)
BASOPHILS NFR BLD: 0.6 % (ref 0–1.9)
BILIRUB SERPL-MCNC: 1.2 MG/DL (ref 0.1–1)
BUN SERPL-MCNC: 56 MG/DL (ref 8–23)
CALCIUM SERPL-MCNC: 8.6 MG/DL (ref 8.7–10.5)
CHLORIDE SERPL-SCNC: 100 MMOL/L (ref 95–110)
CO2 SERPL-SCNC: 22 MMOL/L (ref 23–29)
CREAT SERPL-MCNC: 3.2 MG/DL (ref 0.5–1.4)
DIFFERENTIAL METHOD BLD: ABNORMAL
EOSINOPHIL # BLD AUTO: 0.3 K/UL (ref 0–0.5)
EOSINOPHIL NFR BLD: 2.9 % (ref 0–8)
ERYTHROCYTE [DISTWIDTH] IN BLOOD BY AUTOMATED COUNT: 13.6 % (ref 11.5–14.5)
EST. GFR  (NO RACE VARIABLE): 19.1 ML/MIN/1.73 M^2
ESTIMATED AVG GLUCOSE: 143 MG/DL (ref 68–131)
GLUCOSE SERPL-MCNC: 163 MG/DL (ref 70–110)
HBA1C MFR BLD: 6.6 % (ref 4–5.6)
HCT VFR BLD AUTO: 42 % (ref 40–54)
HGB BLD-MCNC: 14.1 G/DL (ref 14–18)
IMM GRANULOCYTES # BLD AUTO: 0.06 K/UL (ref 0–0.04)
IMM GRANULOCYTES NFR BLD AUTO: 0.6 % (ref 0–0.5)
INR PPP: 1.6 (ref 0.8–1.2)
LYMPHOCYTES # BLD AUTO: 0.9 K/UL (ref 1–4.8)
LYMPHOCYTES NFR BLD: 9.8 % (ref 18–48)
MAGNESIUM SERPL-MCNC: 2.6 MG/DL (ref 1.6–2.6)
MCH RBC QN AUTO: 34.3 PG (ref 27–31)
MCHC RBC AUTO-ENTMCNC: 33.6 G/DL (ref 32–36)
MCV RBC AUTO: 102 FL (ref 82–98)
MONOCYTES # BLD AUTO: 0.9 K/UL (ref 0.3–1)
MONOCYTES NFR BLD: 9.2 % (ref 4–15)
NEUTROPHILS # BLD AUTO: 7.1 K/UL (ref 1.8–7.7)
NEUTROPHILS NFR BLD: 76.9 % (ref 38–73)
NRBC BLD-RTO: 0 /100 WBC
OHS QRS DURATION: 82 MS
OHS QTC CALCULATION: 495 MS
OSMOLALITY SERPL: 314 MOSM/KG (ref 280–300)
PLATELET # BLD AUTO: 186 K/UL (ref 150–450)
PMV BLD AUTO: 10.4 FL (ref 9.2–12.9)
POTASSIUM SERPL-SCNC: 4.6 MMOL/L (ref 3.5–5.1)
PROT SERPL-MCNC: 7.2 G/DL (ref 6–8.4)
PROTHROMBIN TIME: 16.6 SEC (ref 9–12.5)
RBC # BLD AUTO: 4.11 M/UL (ref 4.6–6.2)
SODIUM SERPL-SCNC: 133 MMOL/L (ref 136–145)
TROPONIN I SERPL DL<=0.01 NG/ML-MCNC: 0.1 NG/ML (ref 0–0.03)
TROPONIN I SERPL DL<=0.01 NG/ML-MCNC: 0.11 NG/ML (ref 0–0.03)
WBC # BLD AUTO: 9.27 K/UL (ref 3.9–12.7)

## 2024-09-25 PROCEDURE — 20600001 HC STEP DOWN PRIVATE ROOM

## 2024-09-25 PROCEDURE — 80053 COMPREHEN METABOLIC PANEL: CPT | Performed by: STUDENT IN AN ORGANIZED HEALTH CARE EDUCATION/TRAINING PROGRAM

## 2024-09-25 PROCEDURE — 87154 CUL TYP ID BLD PTHGN 6+ TRGT: CPT

## 2024-09-25 PROCEDURE — 93005 ELECTROCARDIOGRAM TRACING: CPT

## 2024-09-25 PROCEDURE — 25000003 PHARM REV CODE 250

## 2024-09-25 PROCEDURE — 99285 EMERGENCY DEPT VISIT HI MDM: CPT | Mod: 25

## 2024-09-25 PROCEDURE — 83735 ASSAY OF MAGNESIUM: CPT | Performed by: STUDENT IN AN ORGANIZED HEALTH CARE EDUCATION/TRAINING PROGRAM

## 2024-09-25 PROCEDURE — 84484 ASSAY OF TROPONIN QUANT: CPT

## 2024-09-25 PROCEDURE — 85730 THROMBOPLASTIN TIME PARTIAL: CPT

## 2024-09-25 PROCEDURE — 12000002 HC ACUTE/MED SURGE SEMI-PRIVATE ROOM

## 2024-09-25 PROCEDURE — 83036 HEMOGLOBIN GLYCOSYLATED A1C: CPT

## 2024-09-25 PROCEDURE — 85610 PROTHROMBIN TIME: CPT

## 2024-09-25 PROCEDURE — 84484 ASSAY OF TROPONIN QUANT: CPT | Mod: 91

## 2024-09-25 PROCEDURE — 93010 ELECTROCARDIOGRAM REPORT: CPT | Mod: ,,, | Performed by: INTERNAL MEDICINE

## 2024-09-25 PROCEDURE — 87040 BLOOD CULTURE FOR BACTERIA: CPT | Mod: 59

## 2024-09-25 PROCEDURE — 83930 ASSAY OF BLOOD OSMOLALITY: CPT

## 2024-09-25 PROCEDURE — 85025 COMPLETE CBC W/AUTO DIFF WBC: CPT

## 2024-09-25 PROCEDURE — 82140 ASSAY OF AMMONIA: CPT

## 2024-09-25 RX ORDER — DAPAGLIFLOZIN 10 MG/1
10 TABLET, FILM COATED ORAL DAILY
Status: DISCONTINUED | OUTPATIENT
Start: 2024-09-26 | End: 2024-09-25

## 2024-09-25 RX ORDER — ATORVASTATIN CALCIUM 20 MG/1
20 TABLET, FILM COATED ORAL NIGHTLY
Status: DISCONTINUED | OUTPATIENT
Start: 2024-09-25 | End: 2024-09-27 | Stop reason: HOSPADM

## 2024-09-25 RX ORDER — LACTULOSE 10 G/15ML
20 SOLUTION ORAL
Status: COMPLETED | OUTPATIENT
Start: 2024-09-25 | End: 2024-09-25

## 2024-09-25 RX ORDER — METOPROLOL TARTRATE 25 MG/1
25 TABLET, FILM COATED ORAL 2 TIMES DAILY
Status: DISCONTINUED | OUTPATIENT
Start: 2024-09-26 | End: 2024-09-26

## 2024-09-25 RX ORDER — DABIGATRAN ETEXILATE 75 MG/1
CAPSULE ORAL
Status: CANCELLED | OUTPATIENT
Start: 2024-09-25

## 2024-09-25 RX ORDER — NALOXONE HCL 0.4 MG/ML
0.02 VIAL (ML) INJECTION
Status: DISCONTINUED | OUTPATIENT
Start: 2024-09-25 | End: 2024-09-27 | Stop reason: HOSPADM

## 2024-09-25 RX ORDER — SERTRALINE HYDROCHLORIDE 25 MG/1
25 TABLET, FILM COATED ORAL DAILY
Status: DISCONTINUED | OUTPATIENT
Start: 2024-09-25 | End: 2024-09-27 | Stop reason: HOSPADM

## 2024-09-25 RX ORDER — GLUCAGON 1 MG
1 KIT INJECTION
Status: DISCONTINUED | OUTPATIENT
Start: 2024-09-25 | End: 2024-09-27 | Stop reason: HOSPADM

## 2024-09-25 RX ORDER — PANTOPRAZOLE SODIUM 40 MG/1
40 TABLET, DELAYED RELEASE ORAL DAILY
Status: DISCONTINUED | OUTPATIENT
Start: 2024-09-26 | End: 2024-09-27 | Stop reason: HOSPADM

## 2024-09-25 RX ORDER — IBUPROFEN 200 MG
16 TABLET ORAL
Status: DISCONTINUED | OUTPATIENT
Start: 2024-09-25 | End: 2024-09-25

## 2024-09-25 RX ORDER — SODIUM CHLORIDE 0.9 % (FLUSH) 0.9 %
10 SYRINGE (ML) INJECTION EVERY 12 HOURS PRN
Status: DISCONTINUED | OUTPATIENT
Start: 2024-09-25 | End: 2024-09-27 | Stop reason: HOSPADM

## 2024-09-25 RX ORDER — IBUPROFEN 200 MG
16 TABLET ORAL
Status: DISCONTINUED | OUTPATIENT
Start: 2024-09-25 | End: 2024-09-27 | Stop reason: HOSPADM

## 2024-09-25 RX ORDER — LEVOTHYROXINE SODIUM 100 UG/1
100 TABLET ORAL DAILY
Status: DISCONTINUED | OUTPATIENT
Start: 2024-09-26 | End: 2024-09-27 | Stop reason: HOSPADM

## 2024-09-25 RX ORDER — INSULIN ASPART 100 [IU]/ML
0-5 INJECTION, SOLUTION INTRAVENOUS; SUBCUTANEOUS
Status: DISCONTINUED | OUTPATIENT
Start: 2024-09-25 | End: 2024-09-27 | Stop reason: HOSPADM

## 2024-09-25 RX ORDER — FOLIC ACID 1 MG/1
1000 TABLET ORAL DAILY
Status: DISCONTINUED | OUTPATIENT
Start: 2024-09-26 | End: 2024-09-27 | Stop reason: HOSPADM

## 2024-09-25 RX ORDER — TAMSULOSIN HYDROCHLORIDE 0.4 MG/1
1 CAPSULE ORAL DAILY
Status: DISCONTINUED | OUTPATIENT
Start: 2024-09-26 | End: 2024-09-27 | Stop reason: HOSPADM

## 2024-09-25 RX ORDER — IBUPROFEN 200 MG
24 TABLET ORAL
Status: DISCONTINUED | OUTPATIENT
Start: 2024-09-25 | End: 2024-09-27 | Stop reason: HOSPADM

## 2024-09-25 RX ORDER — FOLIC ACID 1 MG/1
1 TABLET ORAL DAILY
Status: DISCONTINUED | OUTPATIENT
Start: 2024-09-26 | End: 2024-09-25

## 2024-09-25 RX ORDER — DABIGATRAN ETEXILATE 75 MG/1
75 CAPSULE ORAL 2 TIMES DAILY
Status: DISCONTINUED | OUTPATIENT
Start: 2024-09-25 | End: 2024-09-27 | Stop reason: HOSPADM

## 2024-09-25 RX ORDER — LACTULOSE 10 G/15ML
20 SOLUTION ORAL 3 TIMES DAILY
Status: DISCONTINUED | OUTPATIENT
Start: 2024-09-25 | End: 2024-09-27 | Stop reason: HOSPADM

## 2024-09-25 RX ADMIN — DABIGATRAN ETEXILATE MESYLATE 75 MG: 75 CAPSULE ORAL at 11:09

## 2024-09-25 RX ADMIN — LACTULOSE 20 G: 20 SOLUTION ORAL at 03:09

## 2024-09-25 RX ADMIN — ATORVASTATIN CALCIUM 20 MG: 20 TABLET, FILM COATED ORAL at 09:09

## 2024-09-25 RX ADMIN — RIFAXIMIN 550 MG: 550 TABLET ORAL at 09:09

## 2024-09-25 RX ADMIN — LACTULOSE 20 G: 20 SOLUTION ORAL at 09:09

## 2024-09-25 RX ADMIN — SERTRALINE HYDROCHLORIDE 25 MG: 25 TABLET ORAL at 11:09

## 2024-09-25 NOTE — ED TRIAGE NOTES
Philip Kam ., a 78 y.o. male presents to the ED w/ complaint of alter mental status. Pt arrives to ED via personal vehicle. Pt was intermittently confused per wife this morning. Pt is Aox4. Hx of cirrhosis, pt had paracentesis done yesterday, approximately 10L removed. Pt denies any complaints at this time.     Triage note:  Chief Complaint   Patient presents with    Altered Mental Status     Hx of cirrhosis of the liver. Paracentesis done yesterday, 9/24/24, 10L removed. Patient with intermittent confusion and elevated ammonia per wife. Patient denies any complaints at this time     Review of patient's allergies indicates:   Allergen Reactions    Codeine Itching     Past Medical History:   Diagnosis Date    Alcoholic cirrhosis of liver with ascites 7/7/2023    Anticoagulant long-term use     Anxiety     CHF (congestive heart failure)     High cholesterol     Hypertension     Hypothyroidism     Type 2 diabetes mellitus, uncontrolled

## 2024-09-25 NOTE — ED PROVIDER NOTES
Encounter Date: 9/25/2024       History     Chief Complaint   Patient presents with    Altered Mental Status     Hx of cirrhosis of the liver. Paracentesis done yesterday, 9/24/24, 10L removed. Patient with intermittent confusion and elevated ammonia per wife. Patient denies any complaints at this time     77 y/o with PMHx of alcohol-related cirrhosis, a-fib, CHF, T2DM, hypothyroidism and HTM presents in ER today for AMS. At 9am this morning he was unable to prick his finger prior to checking glucose levels, unable to ambulate, and unable to use any electronics. Wife is at bedside and says he is always able to do these activities with ease. Yesterday he had a paracentesis d/t his complicated cirrhosis where they removed over 10.25 L. He has had multiple paracentesis done with no prior complications. He takes lactulose TID but no rifampin. Has abdominal distention. He has had minimal oral intake with 1-2 meals a day at most with 1-2 cans of soda for months. He has a phlegm like mucus vomiting episode almost daily. Denies abdominal pain, chest pain, SOB, Nausea, diarrhea, dysuria, hematuria.    The history is provided by the patient and medical records.     Review of patient's allergies indicates:   Allergen Reactions    Codeine Itching     Past Medical History:   Diagnosis Date    Alcoholic cirrhosis of liver with ascites 7/7/2023    Anticoagulant long-term use     Anxiety     CHF (congestive heart failure)     High cholesterol     Hypertension     Hypothyroidism     Type 2 diabetes mellitus, uncontrolled      Past Surgical History:   Procedure Laterality Date    BACK SURGERY      COLONOSCOPY      COLONOSCOPY N/A 3/21/2023    Procedure: COLONOSCOPY;  Surgeon: Ronnie Hinton MD;  Location: [facility];  Service: Endoscopy;  Laterality: N/A;    ESOPHAGOGASTRODUODENOSCOPY N/A 3/21/2023    Procedure: EGD (ESOPHAGOGASTRODUODENOSCOPY);  Surgeon: Ronnie Hinton MD;  Location: [facility];  Service: Endoscopy;  Laterality:  "N/A;    ESOPHAGUS SURGERY  2012    EYE SURGERY      LUMBAR EPIDURAL INJECTION  01/2018    x2    TONSILLECTOMY      TREATMENT OF CARDIAC ARRHYTHMIA N/A 4/8/2021    Procedure: CARDIOVERSION;  Surgeon: Vinayak Eric MD;  Location: [facility] EP LAB;  Service: Cardiology;  Laterality: N/A;  a fib, dccv/hilario, mac, pr,, sscu    TREATMENT OF CARDIAC ARRHYTHMIA N/A 5/25/2021    Procedure: CARDIOVERSION;  Surgeon: Vinayak Eric MD;  Location: [facility] EP LAB;  Service: Cardiology;  Laterality: N/A;  AF, HILARIO (Cx if compliant), DCCV, MAC, SD, 3Prep     Family History   Problem Relation Name Age of Onset    Heart attack Son       Social History     Tobacco Use    Smoking status: Never    Smokeless tobacco: Never   Substance Use Topics    Alcohol use: Yes     Alcohol/week: 1.0 standard drink of alcohol     Types: 1 Cans of beer per week     Comment: Everyother day_Pt stated,"I drink a few beers."    Drug use: No     Review of Systems  See HPI  Physical Exam     Initial Vitals   BP Pulse Resp Temp SpO2   09/25/24 1340 09/25/24 1340 09/25/24 1340 09/25/24 1342 09/25/24 1340   103/64 85 19 97.4 °F (36.3 °C) 99 %      MAP       --                Physical Exam    Vitals reviewed.  Constitutional: He appears well-developed and well-nourished. He is not diaphoretic. No distress.   Eyes: Conjunctivae and EOM are normal. Pupils are equal, round, and reactive to light.   Neck: Neck supple.   Cardiovascular:  Normal rate, regular rhythm and normal heart sounds.     Exam reveals no gallop and no friction rub.       No murmur heard.  No lower extremity edema   Pulmonary/Chest: Breath sounds normal. No respiratory distress.   Abdominal: Abdomen is soft. He exhibits distension. There is no abdominal tenderness.   Musculoskeletal:      Cervical back: Neck supple.     Neurological: He is alert. He is disoriented. No cranial nerve deficit.   Only alert to self and place.  Positive asterixis   Psychiatric: He has a normal mood and affect.         ED Course "   Procedures  Labs Reviewed   CBC W/ AUTO DIFFERENTIAL - Abnormal       Result Value    WBC 9.27      RBC 4.11 (*)     Hemoglobin 14.1      Hematocrit 42.0       (*)     MCH 34.3 (*)     MCHC 33.6      RDW 13.6      Platelets 186      MPV 10.4      Immature Granulocytes 0.6 (*)     Gran # (ANC) 7.1      Immature Grans (Abs) 0.06 (*)     Lymph # 0.9 (*)     Mono # 0.9      Eos # 0.3      Baso # 0.06      nRBC 0      Gran % 76.9 (*)     Lymph % 9.8 (*)     Mono % 9.2      Eosinophil % 2.9      Basophil % 0.6      Differential Method Automated     TROPONIN I - Abnormal    Troponin I 0.114 (*)    AMMONIA - Abnormal    Ammonia 148 (*)    APTT - Abnormal    aPTT 44.3 (*)    PROTIME-INR - Abnormal    Prothrombin Time 16.6 (*)     INR 1.6 (*)    COMPREHENSIVE METABOLIC PANEL - Abnormal    Sodium 133 (*)     Potassium 4.6      Chloride 100      CO2 22 (*)     Glucose 163 (*)     BUN 56 (*)     Creatinine 3.2 (*)     Calcium 8.6 (*)     Total Protein 7.2      Albumin 3.0 (*)     Total Bilirubin 1.2 (*)     Alkaline Phosphatase 121      AST 39      ALT 20      eGFR 19.1 (*)     Anion Gap 11     CULTURE, BLOOD   CULTURE, BLOOD   MAGNESIUM    Magnesium 2.6     URINALYSIS, REFLEX TO URINE CULTURE   TROPONIN I   SODIUM, URINE, RANDOM   PROTEIN / CREATININE RATIO, URINE   CREATININE, URINE, RANDOM   OSMOLALITY, URINE RANDOM   OSMOLALITY, SERUM   HEMOGLOBIN A1C   POCT GLUCOSE MONITORING CONTINUOUS          Imaging Results              CT Head Without Contrast (Final result)  Result time 09/25/24 18:10:12      Final result by Candie Shah MD (09/25/24 18:10:12)                   Impression:      No acute intracranial abnormality or fracture.    Microvascular chronic ischemic changes.      Electronically signed by: Candie Shah  Date:    09/25/2024  Time:    18:10               Narrative:    EXAMINATION:  CT HEAD WITHOUT CONTRAST    CLINICAL HISTORY:  Mental status change, unknown cause;    TECHNIQUE:  Low dose axial  images were obtained through the head.  Coronal and sagittal reformations were also performed. Contrast was not administered.    COMPARISON:  08/16/2013 CT head    FINDINGS:  There is no acute intra or extra-axial hemorrhage or hematoma.  The gray-white matter junction differentiation is intact.  There is prominence of the ventricles, cisterns and sulci.  Mild deep white matter low density as seen with microvascular chronic ischemic changes.  Paranasal sinuses mastoid air cells and middle ears are clear.  Orbital structures appear symmetric.  Bony calvarium is intact.                                       X-Ray Chest AP Portable (Final result)  Result time 09/25/24 16:05:42      Final result by Logan Eisenberg MD (09/25/24 16:05:42)                   Impression:      No acute cardiopulmonary disease.      Electronically signed by: Logan Eisenberg MD  Date:    09/25/2024  Time:    16:05               Narrative:    EXAMINATION:  XR CHEST AP PORTABLE    CLINICAL HISTORY:  altered mental status;    TECHNIQUE:  Single frontal view of the chest was performed.    COMPARISON:  03/14/2023    FINDINGS:  The heart size is normal.  Mediastinum shows aortic atherosclerosis.  Lungs are not fully expanded but look clear.  No pleural fluid detected.  The skeletal structures are intact.  Monitor wires overlie the chest.                                       Medications   sodium chloride 0.9% flush 10 mL (has no administration in time range)   naloxone 0.4 mg/mL injection 0.02 mg (has no administration in time range)   glucose chewable tablet 24 g (has no administration in time range)   glucagon (human recombinant) injection 1 mg (has no administration in time range)   dextrose 10% bolus 125 mL 125 mL (has no administration in time range)   dextrose 10% bolus 250 mL 250 mL (has no administration in time range)   lactulose 20 gram/30 mL solution Soln 20 g (has no administration in time range)   atorvastatin tablet 20 mg (has  no administration in time range)   sertraline tablet 25 mg (has no administration in time range)   tamsulosin 24 hr capsule 0.4 mg (has no administration in time range)   levothyroxine tablet 100 mcg (has no administration in time range)   pantoprazole EC tablet 40 mg (has no administration in time range)   metoprolol tartrate (LOPRESSOR) tablet 25 mg (has no administration in time range)   dabigatran etexilate capsule 75 mg (has no administration in time range)   folic acid tablet 1,000 mcg (has no administration in time range)   rifAXIMin tablet 550 mg (has no administration in time range)   glucose chewable tablet 16 g (has no administration in time range)   insulin aspart U-100 pen 0-5 Units (has no administration in time range)   lactulose 20 gram/30 mL solution Soln 20 g (20 g Oral Given 9/25/24 1533)     Medical Decision Making  Amount and/or Complexity of Data Reviewed  Labs: ordered. Decision-making details documented in ED Course.  Radiology: ordered.    Risk  Prescription drug management.  Decision regarding hospitalization.         APC / Resident Notes:   78-year-old male with history of cirrhosis presenting with altered mental status onset this morning.  Vitals WNL.  Clinically well-appearing, in no acute distress.  Alert and oriented x2, not patient's baseline per wife.  Altered mental status workup initiated including labs, UA, CXR, and head CT.  Suspect hepatic encephalopathy, will give lactulose and plan for admission.    My differential diagnoses include but are not limited to:  Hepatic encephalopathy, ICH, intracranial mass, UTI, pneumonia, ACS, electrolyte abnormality, YARITZA  See ED course.  I have reviewed the patient's records and discussed with my supervising physician.        ED Course as of 09/25/24 2040   Wed Sep 25, 2024   1622 Troponin I(!): 0.114 [KB]   1632 Ammonia(!): 148 [KB]   1714 Creatinine(!): 3.2 [KB]   1720 Admitted to hospital medicine for further workup and treatment.  Wife and  patient agree with plan [KB]      ED Course User Index  [KB] Sonali Bullard PA-C                           Clinical Impression:  Final diagnoses:  [R41.82] AMS (altered mental status)  [K76.82] Hepatic encephalopathy (Primary)  [N17.9] YARITZA (acute kidney injury)          ED Disposition Condition    Admit Stable                Sonali Bullard PA-C  09/25/24 2040

## 2024-09-26 PROBLEM — N18.4 STAGE 4 CHRONIC KIDNEY DISEASE: Status: ACTIVE | Noted: 2024-09-25

## 2024-09-26 PROBLEM — N18.9 ACUTE KIDNEY INJURY SUPERIMPOSED ON CKD: Status: ACTIVE | Noted: 2024-04-25

## 2024-09-26 PROBLEM — R79.89 ELEVATED TROPONIN: Status: ACTIVE | Noted: 2024-09-26

## 2024-09-26 PROBLEM — D68.9 COAGULOPATHY: Status: ACTIVE | Noted: 2024-09-26

## 2024-09-26 LAB
ALBUMIN SERPL BCP-MCNC: 2.8 G/DL (ref 3.5–5.2)
ALP SERPL-CCNC: 109 U/L (ref 55–135)
ALT SERPL W/O P-5'-P-CCNC: 18 U/L (ref 10–44)
ANION GAP SERPL CALC-SCNC: 12 MMOL/L (ref 8–16)
AST SERPL-CCNC: 28 U/L (ref 10–40)
BASOPHILS # BLD AUTO: 0.05 K/UL (ref 0–0.2)
BASOPHILS NFR BLD: 0.7 % (ref 0–1.9)
BILIRUB SERPL-MCNC: 1.3 MG/DL (ref 0.1–1)
BUN SERPL-MCNC: 55 MG/DL (ref 8–23)
CALCIUM SERPL-MCNC: 8.8 MG/DL (ref 8.7–10.5)
CHLORIDE SERPL-SCNC: 102 MMOL/L (ref 95–110)
CO2 SERPL-SCNC: 20 MMOL/L (ref 23–29)
CREAT SERPL-MCNC: 2.8 MG/DL (ref 0.5–1.4)
CREAT UR-MCNC: 102 MG/DL (ref 23–375)
CREAT UR-MCNC: 102 MG/DL (ref 23–375)
DIFFERENTIAL METHOD BLD: ABNORMAL
EOSINOPHIL # BLD AUTO: 0.1 K/UL (ref 0–0.5)
EOSINOPHIL NFR BLD: 1.9 % (ref 0–8)
ERYTHROCYTE [DISTWIDTH] IN BLOOD BY AUTOMATED COUNT: 13.6 % (ref 11.5–14.5)
EST. GFR  (NO RACE VARIABLE): 22.4 ML/MIN/1.73 M^2
GLUCOSE SERPL-MCNC: 160 MG/DL (ref 70–110)
HCT VFR BLD AUTO: 41.7 % (ref 40–54)
HGB BLD-MCNC: 14 G/DL (ref 14–18)
IMM GRANULOCYTES # BLD AUTO: 0.03 K/UL (ref 0–0.04)
IMM GRANULOCYTES NFR BLD AUTO: 0.4 % (ref 0–0.5)
INR PPP: 1.8 (ref 0.8–1.2)
LYMPHOCYTES # BLD AUTO: 0.7 K/UL (ref 1–4.8)
LYMPHOCYTES NFR BLD: 10.1 % (ref 18–48)
MAGNESIUM SERPL-MCNC: 2.6 MG/DL (ref 1.6–2.6)
MCH RBC QN AUTO: 34 PG (ref 27–31)
MCHC RBC AUTO-ENTMCNC: 33.6 G/DL (ref 32–36)
MCV RBC AUTO: 101 FL (ref 82–98)
MONOCYTES # BLD AUTO: 0.6 K/UL (ref 0.3–1)
MONOCYTES NFR BLD: 8.4 % (ref 4–15)
NEUTROPHILS # BLD AUTO: 5.7 K/UL (ref 1.8–7.7)
NEUTROPHILS NFR BLD: 78.5 % (ref 38–73)
NRBC BLD-RTO: 0 /100 WBC
OHS QRS DURATION: 84 MS
OHS QTC CALCULATION: 491 MS
OSMOLALITY UR: 510 MOSM/KG (ref 50–1200)
PHOSPHATE SERPL-MCNC: 3.5 MG/DL (ref 2.7–4.5)
PLATELET # BLD AUTO: 161 K/UL (ref 150–450)
PMV BLD AUTO: 10.5 FL (ref 9.2–12.9)
POCT GLUCOSE: 159 MG/DL (ref 70–110)
POCT GLUCOSE: 165 MG/DL (ref 70–110)
POCT GLUCOSE: 203 MG/DL (ref 70–110)
POCT GLUCOSE: 226 MG/DL (ref 70–110)
POTASSIUM SERPL-SCNC: 4 MMOL/L (ref 3.5–5.1)
PROT SERPL-MCNC: 6.7 G/DL (ref 6–8.4)
PROT UR-MCNC: <7 MG/DL (ref 0–15)
PROT/CREAT UR: NORMAL MG/G{CREAT} (ref 0–0.2)
PROTHROMBIN TIME: 18.7 SEC (ref 9–12.5)
RBC # BLD AUTO: 4.12 M/UL (ref 4.6–6.2)
SODIUM SERPL-SCNC: 134 MMOL/L (ref 136–145)
SODIUM UR-SCNC: 17 MMOL/L (ref 20–250)
WBC # BLD AUTO: 7.23 K/UL (ref 3.9–12.7)

## 2024-09-26 PROCEDURE — 25000003 PHARM REV CODE 250: Performed by: STUDENT IN AN ORGANIZED HEALTH CARE EDUCATION/TRAINING PROGRAM

## 2024-09-26 PROCEDURE — 97165 OT EVAL LOW COMPLEX 30 MIN: CPT

## 2024-09-26 PROCEDURE — 97530 THERAPEUTIC ACTIVITIES: CPT

## 2024-09-26 PROCEDURE — 25000003 PHARM REV CODE 250

## 2024-09-26 PROCEDURE — 85610 PROTHROMBIN TIME: CPT

## 2024-09-26 PROCEDURE — 97116 GAIT TRAINING THERAPY: CPT

## 2024-09-26 PROCEDURE — 83735 ASSAY OF MAGNESIUM: CPT

## 2024-09-26 PROCEDURE — 63600175 PHARM REV CODE 636 W HCPCS

## 2024-09-26 PROCEDURE — 84156 ASSAY OF PROTEIN URINE: CPT

## 2024-09-26 PROCEDURE — 83935 ASSAY OF URINE OSMOLALITY: CPT

## 2024-09-26 PROCEDURE — 20600001 HC STEP DOWN PRIVATE ROOM

## 2024-09-26 PROCEDURE — 84300 ASSAY OF URINE SODIUM: CPT

## 2024-09-26 PROCEDURE — 85025 COMPLETE CBC W/AUTO DIFF WBC: CPT

## 2024-09-26 PROCEDURE — 84100 ASSAY OF PHOSPHORUS: CPT

## 2024-09-26 PROCEDURE — 80053 COMPREHEN METABOLIC PANEL: CPT

## 2024-09-26 PROCEDURE — 36415 COLL VENOUS BLD VENIPUNCTURE: CPT

## 2024-09-26 PROCEDURE — 97161 PT EVAL LOW COMPLEX 20 MIN: CPT

## 2024-09-26 RX ORDER — RIFAXIMIN 550 MG/1
550 TABLET ORAL 2 TIMES DAILY
Qty: 60 TABLET | Refills: 11 | Status: SHIPPED | OUTPATIENT
Start: 2024-09-26

## 2024-09-26 RX ORDER — SERTRALINE HYDROCHLORIDE 25 MG/1
25 TABLET, FILM COATED ORAL DAILY
COMMUNITY

## 2024-09-26 RX ORDER — METOPROLOL TARTRATE 25 MG/1
12.5 TABLET ORAL 2 TIMES DAILY
Status: DISCONTINUED | OUTPATIENT
Start: 2024-09-26 | End: 2024-09-27 | Stop reason: HOSPADM

## 2024-09-26 RX ORDER — INSULIN GLARGINE 100 [IU]/ML
20 INJECTION, SOLUTION SUBCUTANEOUS DAILY
Status: DISCONTINUED | OUTPATIENT
Start: 2024-09-26 | End: 2024-09-27 | Stop reason: HOSPADM

## 2024-09-26 RX ADMIN — METOPROLOL TARTRATE 12.5 MG: 25 TABLET, FILM COATED ORAL at 09:09

## 2024-09-26 RX ADMIN — RIFAXIMIN 550 MG: 550 TABLET ORAL at 09:09

## 2024-09-26 RX ADMIN — TAMSULOSIN HYDROCHLORIDE 0.4 MG: 0.4 CAPSULE ORAL at 09:09

## 2024-09-26 RX ADMIN — PANTOPRAZOLE SODIUM 40 MG: 40 TABLET, DELAYED RELEASE ORAL at 09:09

## 2024-09-26 RX ADMIN — DABIGATRAN ETEXILATE MESYLATE 75 MG: 75 CAPSULE ORAL at 09:09

## 2024-09-26 RX ADMIN — FOLIC ACID 1000 MCG: 1 TABLET ORAL at 09:09

## 2024-09-26 RX ADMIN — INSULIN ASPART 5 UNITS: 100 INJECTION, SOLUTION INTRAVENOUS; SUBCUTANEOUS at 04:09

## 2024-09-26 RX ADMIN — LEVOTHYROXINE SODIUM 100 MCG: 100 TABLET ORAL at 07:09

## 2024-09-26 RX ADMIN — INSULIN GLARGINE 20 UNITS: 100 INJECTION, SOLUTION SUBCUTANEOUS at 04:09

## 2024-09-26 RX ADMIN — LACTULOSE 20 G: 20 SOLUTION ORAL at 09:09

## 2024-09-26 RX ADMIN — SERTRALINE HYDROCHLORIDE 25 MG: 25 TABLET ORAL at 09:09

## 2024-09-26 RX ADMIN — ATORVASTATIN CALCIUM 20 MG: 20 TABLET, FILM COATED ORAL at 09:09

## 2024-09-26 NOTE — PLAN OF CARE
Pt engaged well in therapy this date.     Problem: Occupational Therapy  Goal: Occupational Therapy Goal  Description: Goals to be met by: 10/26/24     Patient will increase functional independence with ADLs by performing:    UE Dressing with Set-up Assistance.  LE Dressing with Set-up Assistance.  Grooming while standing at sink with Stand-by Assistance.  Toileting from toilet with Stand-by Assistance for hygiene and clothing management.   Step transfer with Stand By Assistance with LRAD  Toilet transfer to toilet with Stand-by Assistance with LRAD    Outcome: Progressing

## 2024-09-26 NOTE — ED NOTES
LOC: The patient is awake, alert, aware of environment with an appropriate affect. Oriented x4, speaking appropriately  APPEARANCE: Pt resting comfortably, in no acute distress, pt is clean and well groomed, clothing properly fastened  SKIN:The skin is warm and dry, color consistent with ethnicity, patient has normal skin turgor and moist mucus membranes, no bruising noted   RESPIRATORY:Airway is open and patent, respirations are spontaneous, patient has a normal effort and rate, no accessory muscle use noted.  CARDIAC: Normal rate and rhythm, no peripheral edema noted, capillary refill < 3 seconds, bilateral radial pulses 2+.  ABDOMEN: Soft, non tender, non distended. Bowel sounds present x 4 quadrants. Abdominal distention   NEUROLOGIC: PERRLA, facial expression is symmetrical, patient moving all extremities spontaneously, normal sensation in all extremities when touched with a finger.  Follows all commands appropriately  MUSCULOSKELETAL: Patient moving all extremities spontaneously, no obvious swelling or deformities noted.

## 2024-09-26 NOTE — ASSESSMENT & PLAN NOTE
On home Farxiga and Toujeo.       Plan:     [ ] POCT glucose     [ ] Will hold Farxiga given concern for YARITZA    [ ] Switch Toujeo to formulary medication    [ ] SSI

## 2024-09-26 NOTE — CARE UPDATE
I have reviewed the chart of Philip Kam Sr. and collaborated with Cherelle Luke MD in the care of the patient who is hospitalized for the following:    Active Hospital Problems    Diagnosis    *Hepatic encephalopathy    Coagulopathy    Elevated troponin    Chronic diastolic heart failure    Stage 4 chronic kidney disease    Hyponatremia    Alcoholic cirrhosis of liver with ascites    Atrial fibrillation    Hypothyroidism    Essential hypertension    Acute on chronic diastolic heart failure    Hyperlipidemia    Type 2 diabetes mellitus with hyperglycemia, without long-term current use of insulin          I have reviewed Philip Kam Sr. with the multidisciplinary team during discharge huddle.       Melody Morales PA-C  [facility]

## 2024-09-26 NOTE — PLAN OF CARE
Problem: Adult Inpatient Plan of Care  Goal: Absence of Hospital-Acquired Illness or Injury  Outcome: Progressing  Goal: Optimal Comfort and Wellbeing  Outcome: Progressing  Goal: Readiness for Transition of Care  Outcome: Progressing     Problem: Diabetes Comorbidity  Goal: Blood Glucose Level Within Targeted Range  Outcome: Progressing

## 2024-09-26 NOTE — HPI
Philip Kam, 78M with PMHx of alcohol-related cirrhosis, SCC of lip s/p resection, A-fib on pradaxa, CHF (G1DD), T2DM on toujeo and farxiga, hypothyroidism and HTN presents to ER with concerns from wife regarding confusion (unable to prick his finger prior to checking glucose levels and unable to use any electronics) and weakness when ambulating since yesterday after receiving paracentesis which removed over 10 L. Has had multiple paracentesis done with no prior complications. Takes lactulose TID with around 2 bowel movements each day. However, did not have any bowel movements yesterday and had 1 bowel movement the day prior. Wife states patient has been very sleepy for past days. Has minimal oral intake. He has a phlegm like mucus vomiting episode almost daily. Denies abdominal pain, chest pain, SOB, nausea, diarrhea, dysuria, hematuria.     In ED, patient afebrile and normotensive. CBC without leukocytosis. Troponin 0.114. Ammonia 148. CMP with BUN of 56 and Cr 3.2 considering for possible HRS. Total bilirubin 1.2. Patient given 20gm of lactulose resulting in bowel movement.

## 2024-09-26 NOTE — ASSESSMENT & PLAN NOTE
Noted CKD 3b (Baseline SCR ~ 2.2-2.8) per nephrology. Cr on admission 3.2.       Plan:     [ ] Continue to monitor

## 2024-09-26 NOTE — PT/OT/SLP EVAL
"Physical Therapy   Co-Evaluation    Patient Name:  Philip Kam Sr.   MRN:  [id number]    Recommendations:     Discharge Recommendations: Low Intensity Therapy   Discharge Equipment Recommendations: shower chair, wheelchair   Barriers to discharge: None    Assessment:     Philip Kam Sr. is a 78 y.o. male admitted with a medical diagnosis of Hepatic encephalopathy.  He presents with the following impairments/functional limitations: weakness, impaired endurance, impaired self care skills, impaired functional mobility, impaired balance, edema, decreased safety awareness, decreased ROM Patient with good participation this date, ambulating in room with AD and supportive family providing encouragement. Requiring light assist at baseline, but currently functioning below prior level (progressive weakness over the last ~2 weeks). Good family support, will benefit from low intensity therapy following d/c. Patient will continue to benefit from skilled PT during this admit to address BLE strength and endurance deficits, and maximize independence with functional mobility.    Rehab Prognosis: Good; patient would benefit from acute skilled PT services to address these deficits and reach maximum level of function.    Recent Surgery: * No surgery found *      Plan:     During this hospitalization, patient to be seen 3 x/week to address the identified rehab impairments via gait training, therapeutic activities, neuromuscular re-education, therapeutic exercises and progress toward the following goals:    Plan of Care Expires:  10/26/24    Subjective     Chief Complaint: "My belly has never been this big"  Patient/Family Comments/goals: discharge home  Pain/Comfort:  Pain Rating 1: 0/10  Pain Rating Post-Intervention 1: 0/10    Patients cultural, spiritual, Scientologist conflicts given the current situation: no    Living Environment:  Patient lives in [facility] with [address] with wife, son, and grandson. Bathroom contains walk-in " shower with grab bars but no seat inside.  Prior to admission, patients level of function was requiring light assist from wife for ADLs, although patient able to ambulate with mod I using rollator household distances. No falls reported. Equipment used at home: grab bar, rollator.  DME owned (not currently used): none.  Upon discharge, patient will have assistance from family.    Objective:     Communicated with RN prior to session.  Patient found HOB elevated with telemetry  upon PT entry to room.    General Precautions: Standard, fall  Orthopedic Precautions:N/A   Braces: N/A  Respiratory Status: Room air    Exams:  Cognitive Exam:  Patient is oriented to Person, Place, Time, and Situation  Gross Motor Coordination:  WFL  RLE ROM: WFL  RLE Strength: WFL  LLE ROM: WFL  LLE Strength: WFL    Functional Mobility:  Bed Mobility:     Supine to Sit: stand by assistance  Sit to Supine: stand by assistance  Transfers:     Sit to Stand:  contact guard assistance with rolling walker  Gait: 20 ft in room with CGA + RW, mild unsteadiness and FFP noted but no LOB, mildly inconsistent foot placement, shortened step length noted  Balance: sitting: good at EOB, standing: CGA for safety with BUE support on RW      AM-PAC 6 CLICK MOBILITY  Total Score:17       Treatment & Education:  Patient educated on calling for assistance for any needs to improve overall safety awareness.  Patient required co-evaluation with OT for highest quality care d/t decreased activity tolerance and increased level of assistance necessary.  Patient educated on role of PT in acute care, PT POC, and PT goals.  All items placed within reach, and notified on call light usage for assistance with any needs for fall prevention.  RW + BSC placed in room, education provided to only use with staff    Patient left HOB elevated with all lines intact, call button in reach, RN notified, and family present.    GOALS:   Multidisciplinary Problems       Physical Therapy  Goals          Problem: Physical Therapy    Goal Priority Disciplines Outcome Goal Variances Interventions   Physical Therapy Goal     PT, PT/OT Progressing     Description: Goals to be met by: 10/10/24     Patient will increase functional independence with mobility by performin. Supine to sit with Supervision  2. Sit to supine with Supervision  3. Sit to stand transfer with Supervision  4. Bed to chair transfer with Supervision using Rolling Walker  5. Gait  x 75 feet with Supervision using Rolling Walker.   6. Lower extremity exercise program x15 reps per handout, with assistance as needed    Patient has a mobility limitation that significantly impairs their ability to participate in one or more mobility related activities of daily living in customary locations in the home. The mobility limitation cannot be sufficiently resolved by the use of a cane or walker. The use of a manual wheelchair will greatly improve the patient's ability to participate in MRADLs. The patient will use the wheelchair on a regular basis at home. They have expressed their willingness to use a manual wheelchair in the home, and have a caregiver who is available and willing to assist with the wheelchair if needed.                           History:     Past Medical History:   Diagnosis Date    Alcoholic cirrhosis of liver with ascites 2023    Anticoagulant long-term use     Anxiety     CHF (congestive heart failure)     High cholesterol     Hypertension     Hypothyroidism     Type 2 diabetes mellitus, uncontrolled        Past Surgical History:   Procedure Laterality Date    BACK SURGERY      COLONOSCOPY      COLONOSCOPY N/A 3/21/2023    Procedure: COLONOSCOPY;  Surgeon: Ronnie Hinton MD;  Location: [facility];  Service: Endoscopy;  Laterality: N/A;    ESOPHAGOGASTRODUODENOSCOPY N/A 3/21/2023    Procedure: EGD (ESOPHAGOGASTRODUODENOSCOPY);  Surgeon: Ronnie Hinton MD;  Location: [facility];  Service: Endoscopy;  Laterality: N/A;     ESOPHAGUS SURGERY  2012    EYE SURGERY      LUMBAR EPIDURAL INJECTION  01/2018    x2    TONSILLECTOMY      TREATMENT OF CARDIAC ARRHYTHMIA N/A 4/8/2021    Procedure: CARDIOVERSION;  Surgeon: Vinayak Eric MD;  Location: [facility];  Service: Cardiology;  Laterality: N/A;  a fib, dccv/hilario, mac, pr,, sscu    TREATMENT OF CARDIAC ARRHYTHMIA N/A 5/25/2021    Procedure: CARDIOVERSION;  Surgeon: Vinayak Eric MD;  Location: [facility] EP [facility];  Service: Cardiology;  Laterality: N/A;  AF, HILARIO (Cx if compliant), DCCV, MAC, PA, 3Prep       Time Tracking:     PT Received On: 09/26/24  PT Start Time: 1530     PT Stop Time: 1548  PT Total Time (min): 18 min     Billable Minutes: Evaluation 9 and Gait Training 9      09/26/2024

## 2024-09-26 NOTE — PLAN OF CARE
Problem: Physical Therapy  Goal: Physical Therapy Goal  Description: Goals to be met by: 10/10/24     Patient will increase functional independence with mobility by performin. Supine to sit with Supervision  2. Sit to supine with Supervision  3. Sit to stand transfer with Supervision  4. Bed to chair transfer with Supervision using Rolling Walker  5. Gait  x 75 feet with Supervision using Rolling Walker.   6. Lower extremity exercise program x15 reps per handout, with assistance as needed    Patient has a mobility limitation that significantly impairs their ability to participate in one or more mobility related activities of daily living in customary locations in the home. The mobility limitation cannot be sufficiently resolved by the use of a cane or walker. The use of a manual wheelchair will greatly improve the patient's ability to participate in MRADLs. The patient will use the wheelchair on a regular basis at home. They have expressed their willingness to use a manual wheelchair in the home, and have a caregiver who is available and willing to assist with the wheelchair if needed.    PT Evaluation completed 2024   PT goals and POC established.   Low intensity recommendation post-acute  Outcome: Progressing

## 2024-09-26 NOTE — ASSESSMENT & PLAN NOTE
Noted CKD 3b (Baseline SCR ~ 2.0) per nephrology. Cr on admission 3.2.       Plan:     [ ] YARITZA and hyponatremia labs ordered    [ ] Consider consult to nephrology    [ ] Hold home lasix

## 2024-09-26 NOTE — ASSESSMENT & PLAN NOTE
On home metoprolol succinate 50 mg.      Plan:     [ ] Will start Metoprolol Tartrate 25mg bid      [ ] Continue to monitor

## 2024-09-26 NOTE — ED NOTES
Telemetry Verification   Patient placed on Telemetry Box  Verified with War Room  Box # [id number]   Monitor Tech War Room    Rate 100   Rhythm Afib

## 2024-09-26 NOTE — SUBJECTIVE & OBJECTIVE
"Past Medical History:   Diagnosis Date    Alcoholic cirrhosis of liver with ascites 7/7/2023    Anticoagulant long-term use     Anxiety     CHF (congestive heart failure)     High cholesterol     Hypertension     Hypothyroidism     Type 2 diabetes mellitus, uncontrolled        Past Surgical History:   Procedure Laterality Date    BACK SURGERY      COLONOSCOPY      COLONOSCOPY N/A 3/21/2023    Procedure: COLONOSCOPY;  Surgeon: Ronnie Hinton MD;  Location: [facility] ENDO;  Service: Endoscopy;  Laterality: N/A;    ESOPHAGOGASTRODUODENOSCOPY N/A 3/21/2023    Procedure: EGD (ESOPHAGOGASTRODUODENOSCOPY);  Surgeon: Ronnie Hinton MD;  Location: [facility] ENDO;  Service: Endoscopy;  Laterality: N/A;    ESOPHAGUS SURGERY  2012    EYE SURGERY      LUMBAR EPIDURAL INJECTION  01/2018    x2    TONSILLECTOMY      TREATMENT OF CARDIAC ARRHYTHMIA N/A 4/8/2021    Procedure: CARDIOVERSION;  Surgeon: Vinayak Eric MD;  Location: [facility] EP LAB;  Service: Cardiology;  Laterality: N/A;  a fib, dccv/hilario, mac, pr,, sscu    TREATMENT OF CARDIAC ARRHYTHMIA N/A 5/25/2021    Procedure: CARDIOVERSION;  Surgeon: Vinayak Eric MD;  Location: [facility] EP LAB;  Service: Cardiology;  Laterality: N/A;  AF, HILARIO (Cx if compliant), DCCV, MAC, [place], 3Prep       Review of patient's allergies indicates:   Allergen Reactions    Codeine Itching       Current Facility-Administered Medications on File Prior to Encounter   Medication    sodium chloride 0.9% bolus 1,000 mL     Current Outpatient Medications on File Prior to Encounter   Medication Sig    albuterol (PROVENTIL/VENTOLIN HFA) 90 mcg/actuation inhaler     azelastine (ASTELIN) 137 mcg (0.1 %) nasal spray 1 spray by Nasal route 2 (two) times daily.    dabigatran etexilate (PRADAXA) 150 mg Cap Take 1 capsule (150 mg total) by mouth 2 (two) times a day. "Do NOT break, chew, or open capsules."    FARXIGA 10 mg tablet Take 10 mg by mouth.    folic acid (FOLVITE) 400 MCG tablet Take 400 mcg by mouth once daily.    " "furosemide (LASIX) 40 MG tablet Take 1 tablet (40 mg total) by mouth 2 (two) times a day.    lactulose (CHRONULAC) 10 gram/15 mL solution SMARTSIG:Milliliter(s) By Mouth    levothyroxine (SYNTHROID) 100 MCG tablet Take 100 mcg by mouth once daily.     LORazepam (ATIVAN) 0.5 MG tablet Take 0.5 mg by mouth every 6 (six) hours as needed for Anxiety.    metoprolol succinate (TOPROL-XL) 50 MG 24 hr tablet Take 1 tablet (50 mg total) by mouth once daily.    midodrine (PROAMATINE) 2.5 MG Tab Take 2.5 mg by mouth 3 (three) times daily.    pantoprazole (PROTONIX) 40 MG tablet Take 40 mg by mouth once daily.    simvastatin (ZOCOR) 40 MG tablet Take 40 mg by mouth every evening.    tamsulosin (FLOMAX) 0.4 mg Cap Take 1 capsule by mouth.    TOUJEO SOLOSTAR U-300 INSULIN 300 unit/mL (1.5 mL) InPn pen Inject into the skin.    XIFAXAN 550 mg Tab Take 1 tablet (550 mg total) by mouth 2 (two) times daily.     Family History       Problem Relation (Age of Onset)    Heart attack Son          Tobacco Use    Smoking status: Never    Smokeless tobacco: Never   Substance and Sexual Activity    Alcohol use: Yes     Alcohol/week: 1.0 standard drink of alcohol     Types: 1 Cans of beer per week     Comment: Everyother day_Pt stated,"I drink a few beers."    Drug use: No    Sexual activity: Not on file     Review of Systems   Constitutional:  Negative for chills and fever.   Respiratory:  Negative for shortness of breath.    Cardiovascular:  Negative for chest pain.   Gastrointestinal:  Positive for abdominal distention. Negative for abdominal pain, constipation, diarrhea, nausea and vomiting.   Neurological:  Positive for weakness.     Objective:     Vital Signs (Most Recent):  Temp: 97.4 °F (36.3 °C) (09/25/24 1342)  Pulse: 80 (09/25/24 1620)  Resp: 16 (09/25/24 1620)  BP: 104/73 (09/25/24 1620)  SpO2: 98 % (09/25/24 1620) Vital Signs (24h Range):  Temp:  [97.4 °F (36.3 °C)] 97.4 °F (36.3 °C)  Pulse:  [79-85] 80  Resp:  [16-20] 16  SpO2:  " [98 %-99 %] 98 %  BP: ()/(62-79) 104/73        There is no height or weight on file to calculate BMI.     Physical Exam  Vitals and nursing note reviewed.   Constitutional:       Appearance: He is ill-appearing.   HENT:      Head: Normocephalic and atraumatic.   Eyes:      Conjunctiva/sclera: Conjunctivae normal.   Cardiovascular:      Rate and Rhythm: Normal rate and regular rhythm.      Pulses: Normal pulses.      Heart sounds: Normal heart sounds.   Pulmonary:      Effort: Pulmonary effort is normal.      Breath sounds: Normal breath sounds.   Abdominal:      General: There is distension.      Palpations: Abdomen is soft.      Tenderness: There is no abdominal tenderness. There is no guarding or rebound.      Comments: Positive Asterixis   Musculoskeletal:      Right lower leg: No edema.      Left lower leg: No edema.   Neurological:      Mental Status: He is disoriented.      Comments: Alert to person and location.   Psychiatric:         Mood and Affect: Mood normal.         Behavior: Behavior normal.            Significant Labs: All pertinent labs within the past 24 hours have been reviewed.    Significant Imaging: I have reviewed all pertinent imaging results/findings within the past 24 hours.

## 2024-09-26 NOTE — ASSESSMENT & PLAN NOTE
Patient with known Cirrhosis with Child's class B.     MELD-Na score calculated; MELD 3.0: 26 at 9/25/2024  4:11 PM  MELD-Na: 26 at 9/25/2024  4:11 PM  Calculated from:  Serum Creatinine: 3.2 mg/dL (Using max of 3 mg/dL) at 9/25/2024  4:11 PM  Serum Sodium: 133 mmol/L at 9/25/2024  4:11 PM  Total Bilirubin: 1.2 mg/dL at 9/25/2024  4:11 PM  Serum Albumin: 3.0 g/dL at 9/25/2024  4:11 PM  INR(ratio): 1.6 at 9/25/2024  3:19 PM  Age at listing (hypothetical): 78 years  Sex: Male at 9/25/2024  4:11 PM      Continue chronic meds. Etiology likely ETOH. Will avoid any hepatotoxic meds, and monitor CBC/CMP/INR for synthetic function.

## 2024-09-26 NOTE — ASSESSMENT & PLAN NOTE
On Pradaxa. Per cardiology note, eliquis contraindicated given Child-Damon class B cirrhosis. CHADS-VASc 4       Plan:     [ ] Continue home pradaxa

## 2024-09-26 NOTE — H&P
[facility] - Emergency Dept  [facility] Medicine  History & Physical    Patient Name: Philip Kam Sr.  MRN: [id number]  Patient Class: IP- Inpatient  Admission Date: 9/25/2024  Attending Physician: Pedro Tafoya MD   Primary Care Provider: Dylon Chase MD         Patient information was obtained from patient, spouse/SO, and ER records.     Subjective:     Principal Problem:Hepatic encephalopathy    Chief Complaint:   Chief Complaint   Patient presents with    Altered Mental Status     Hx of cirrhosis of the liver. Paracentesis done yesterday, 9/24/24, 10L removed. Patient with intermittent confusion and elevated ammonia per wife. Patient denies any complaints at this time        HPI: Philip Kam, 78M with PMHx of alcohol-related cirrhosis, SCC of lip s/p resection, A-fib on pradaxa, CHF (G1DD), T2DM on toujeo and farxiga, hypothyroidism and HTN presents to ER with concerns from wife regarding confusion (unable to prick his finger prior to checking glucose levels and unable to use any electronics) and weakness when ambulating since yesterday after receiving paracentesis which removed over 10 L. Has had multiple paracentesis done with no prior complications. Takes lactulose TID with around 2 bowel movements each day. However, did not have any bowel movements yesterday and had 1 bowel movement the day prior. Wife states patient has been very sleepy for past days. Has minimal oral intake. He has a phlegm like mucus vomiting episode almost daily. Denies abdominal pain, chest pain, SOB, nausea, diarrhea, dysuria, hematuria.     In ED, patient afebrile and normotensive. CBC without leukocytosis. Troponin 0.114. Ammonia 148. CMP with BUN of 56 and Cr 3.2 considering for possible HRS. Total bilirubin 1.2. Patient given 20gm of lactulose resulting in bowel movement.        Past Medical History:   Diagnosis Date    Alcoholic cirrhosis of liver with ascites 7/7/2023    Anticoagulant long-term use     Anxiety      "CHF (congestive heart failure)     High cholesterol     Hypertension     Hypothyroidism     Type 2 diabetes mellitus, uncontrolled        Past Surgical History:   Procedure Laterality Date    BACK SURGERY      COLONOSCOPY      COLONOSCOPY N/A 3/21/2023    Procedure: COLONOSCOPY;  Surgeon: Ronnie Hinton MD;  Location: [facility] ENDO;  Service: Endoscopy;  Laterality: N/A;    ESOPHAGOGASTRODUODENOSCOPY N/A 3/21/2023    Procedure: EGD (ESOPHAGOGASTRODUODENOSCOPY);  Surgeon: Ronnie Hinton MD;  Location: [facility] ENDO;  Service: Endoscopy;  Laterality: N/A;    ESOPHAGUS SURGERY  2012    EYE SURGERY      LUMBAR EPIDURAL INJECTION  01/2018    x2    TONSILLECTOMY      TREATMENT OF CARDIAC ARRHYTHMIA N/A 4/8/2021    Procedure: CARDIOVERSION;  Surgeon: Vinayak Eric MD;  Location: [facility] EP LAB;  Service: Cardiology;  Laterality: N/A;  a fib, dccv/hilario, mac, pr,, sscu    TREATMENT OF CARDIAC ARRHYTHMIA N/A 5/25/2021    Procedure: CARDIOVERSION;  Surgeon: Vinayak Eric MD;  Location: [facility] EP LAB;  Service: Cardiology;  Laterality: N/A;  AF, HILARIO (Cx if compliant), DCCV, MAC, AL, 3Prep       Review of patient's allergies indicates:   Allergen Reactions    Codeine Itching       Current Facility-Administered Medications on File Prior to Encounter   Medication    sodium chloride 0.9% bolus 1,000 mL     Current Outpatient Medications on File Prior to Encounter   Medication Sig    albuterol (PROVENTIL/VENTOLIN HFA) 90 mcg/actuation inhaler     azelastine (ASTELIN) 137 mcg (0.1 %) nasal spray 1 spray by Nasal route 2 (two) times daily.    dabigatran etexilate (PRADAXA) 150 mg Cap Take 1 capsule (150 mg total) by mouth 2 (two) times a day. "Do NOT break, chew, or open capsules."    FARXIGA 10 mg tablet Take 10 mg by mouth.    folic acid (FOLVITE) 400 MCG tablet Take 400 mcg by mouth once daily.    furosemide (LASIX) 40 MG tablet Take 1 tablet (40 mg total) by mouth 2 (two) times a day.    lactulose (CHRONULAC) 10 gram/15 mL solution " "SMARTSIG:Milliliter(s) By Mouth    levothyroxine (SYNTHROID) 100 MCG tablet Take 100 mcg by mouth once daily.     LORazepam (ATIVAN) 0.5 MG tablet Take 0.5 mg by mouth every 6 (six) hours as needed for Anxiety.    metoprolol succinate (TOPROL-XL) 50 MG 24 hr tablet Take 1 tablet (50 mg total) by mouth once daily.    midodrine (PROAMATINE) 2.5 MG Tab Take 2.5 mg by mouth 3 (three) times daily.    pantoprazole (PROTONIX) 40 MG tablet Take 40 mg by mouth once daily.    simvastatin (ZOCOR) 40 MG tablet Take 40 mg by mouth every evening.    tamsulosin (FLOMAX) 0.4 mg Cap Take 1 capsule by mouth.    TOUJEO SOLOSTAR U-300 INSULIN 300 unit/mL (1.5 mL) InPn pen Inject into the skin.    XIFAXAN 550 mg Tab Take 1 tablet (550 mg total) by mouth 2 (two) times daily.     Family History       Problem Relation (Age of Onset)    Heart attack Son          Tobacco Use    Smoking status: Never    Smokeless tobacco: Never   Substance and Sexual Activity    Alcohol use: Yes     Alcohol/week: 1.0 standard drink of alcohol     Types: 1 Cans of beer per week     Comment: Everyother day_Pt stated,"I drink a few beers."    Drug use: No    Sexual activity: Not on file     Review of Systems   Constitutional:  Negative for chills and fever.   Respiratory:  Negative for shortness of breath.    Cardiovascular:  Negative for chest pain.   Gastrointestinal:  Positive for abdominal distention. Negative for abdominal pain, constipation, diarrhea, nausea and vomiting.   Neurological:  Positive for weakness.     Objective:     Vital Signs (Most Recent):  Temp: 97.4 °F (36.3 °C) (09/25/24 1342)  Pulse: 80 (09/25/24 1620)  Resp: 16 (09/25/24 1620)  BP: 104/73 (09/25/24 1620)  SpO2: 98 % (09/25/24 1620) Vital Signs (24h Range):  Temp:  [97.4 °F (36.3 °C)] 97.4 °F (36.3 °C)  Pulse:  [79-85] 80  Resp:  [16-20] 16  SpO2:  [98 %-99 %] 98 %  BP: ()/(62-79) 104/73        There is no height or weight on file to calculate BMI.     Physical Exam  Vitals and " nursing note reviewed.   Constitutional:       Appearance: He is ill-appearing.   HENT:      Head: Normocephalic and atraumatic.   Eyes:      Conjunctiva/sclera: Conjunctivae normal.   Cardiovascular:      Rate and Rhythm: Normal rate and regular rhythm.      Pulses: Normal pulses.      Heart sounds: Normal heart sounds.   Pulmonary:      Effort: Pulmonary effort is normal.      Breath sounds: Normal breath sounds.   Abdominal:      General: There is distension.      Palpations: Abdomen is soft.      Tenderness: There is no abdominal tenderness. There is no guarding or rebound.      Comments: Positive Asterixis   Musculoskeletal:      Right lower leg: No edema.      Left lower leg: No edema.   Neurological:      Mental Status: He is disoriented.      Comments: Alert to person and location.   Psychiatric:         Mood and Affect: Mood normal.         Behavior: Behavior normal.            Significant Labs: All pertinent labs within the past 24 hours have been reviewed.    Significant Imaging: I have reviewed all pertinent imaging results/findings within the past 24 hours.  Assessment/Plan:     * Hepatic encephalopathy  Philip Kam, 78M with PMHx of alcohol-related cirrhosis, SCC of lip s/p resection, A-fib on pradaxa, CHF (G1DD), T2DM on toujeo and farxiga, hypothyroidism and HTN presenting with confusion and weakness concerning for hepatic encephalopathy.   Ammonia 148. LFTs wnl. T. Bilirubin 1.2      Plan:     [ ] Lactulose 20 gm TID, will up-titrate to achieve 3BM/day    [ ] Rifaximin     CKD (chronic kidney disease)  Noted CKD 3b (Baseline SCR ~ 2.0) per nephrology. Cr on admission 3.2.       Plan:     [ ] YARITZA and hyponatremia labs ordered    [ ] Consider consult to nephrology    [ ] Hold home lasix     Elevated Troponin        Initial Troponin 0.114. Next troponin 0.102. EKG without STEMI/NSTEMI. No chest pain. Low clinical suspicion for ACS event at this time. Likely elevated troponin 2/2 impaired renal  clearance.        Plan:     [ ] Monitor for signs/symptoms of ACS    Chronic diastolic heart failure  Results for orders placed during the hospital encounter of 06/19/24     Echo     Interpretation Summary    Left Ventricle: Normal wall thickness. There is concentric remodeling. There is normal systolic function. Biplane (2D) method of discs ejection fraction is 71%. Grade III diastolic dysfunction.    Right Ventricle: Normal right ventricular cavity size. Systolic function is normal. TAPSE is 1.85 cm.    Left Atrium: Left atrium is moderately dilated. The left atrium volume index is 44.1 mL/m2.    Right Atrium: Right atrium is mildly dilated.    Aortic Valve: There is moderate aortic valve sclerosis. Moderately restricted motion. There is moderate to severe stenosis. Aortic valve area by VTI is 0.99 cm². Aortic valve area by velocity is 0.95 cm². Aortic valve peak velocity is 2.26 m/s. Mean gradient is 12 mmHg. The dimensionless index is 0.30.    Mitral Valve: There is mild regurgitation.    Pulmonary Artery: The estimated pulmonary artery systolic pressure is 30 mmHg.    IVC/SVC: Normal venous pressure at 3 mmHg.     Does not appear euvolemic on examination.                             Plan:                           [ ] Continue to monitor     Alcoholic cirrhosis of liver with ascites  Patient with known Cirrhosis with Child's class B.     MELD-Na score calculated; MELD 3.0: 26 at 9/25/2024  4:11 PM  MELD-Na: 26 at 9/25/2024  4:11 PM  Calculated from:  Serum Creatinine: 3.2 mg/dL (Using max of 3 mg/dL) at 9/25/2024  4:11 PM  Serum Sodium: 133 mmol/L at 9/25/2024  4:11 PM  Total Bilirubin: 1.2 mg/dL at 9/25/2024  4:11 PM  Serum Albumin: 3.0 g/dL at 9/25/2024  4:11 PM  INR(ratio): 1.6 at 9/25/2024  3:19 PM  Age at listing (hypothetical): 78 years  Sex: Male at 9/25/2024  4:11 PM      Continue chronic meds. Etiology likely ETOH. Will avoid any hepatotoxic meds, and monitor CBC/CMP/INR for synthetic function.      Atrial fibrillation  On Pradaxa. Per cardiology note, eliquis contraindicated given Child-Damon class B cirrhosis. CHADS-VASc 4       Plan:     [ ] Continue home pradaxa    Hypothyroidism  Noted       Plan:     [ ] Continue levothyroxine 100 mcg    Type 2 diabetes mellitus with hyperglycemia, without long-term current use of insulin  On home Farxiga and Toujeo.       Plan:     [ ] POCT glucose     [ ] Will hold Farxiga given concern for YARITZA    [ ] Switch Toujeo to formulary medication    [ ] SSI      Essential hypertension  On home metoprolol succinate 50 mg.      Plan:     [ ] Will start Metoprolol Tartrate 25mg bid      [ ] Continue to monitor     Hyperlipidemia  Noted. On home simvastatin      Plan:     [ ] Atorvastatin 20mg      VTE Risk Mitigation (From admission, onward)           Ordered     dabigatran etexilate capsule 75 mg  2 times daily         09/25/24 2014     IP VTE HIGH RISK PATIENT  Once         09/25/24 1912                                    Mawadah Samad, MD  Department of Hospital Medicine  [facility] - Emergency Dept

## 2024-09-26 NOTE — NURSING
Nurses Note -- 4 Eyes    Skin assessed during: Admit      [x] No Altered Skin Integrity Present    []Prevention Measures Documented      [] Yes- Altered Skin Integrity Present or Discovered   [] LDA Added if Not in Epic (Describe Wound)   [] New Altered Skin Integrity was Present on Admit and Documented in LDA   [] Wound Image Taken    Wound Care Consulted? No    Attending Nurse:  Luis Castle RN/Staff Member:   Diann MAXWELL

## 2024-09-26 NOTE — PLAN OF CARE
Pt's primary care office doesn't allow for third party scheduling. Pt has to call to schedule his own hospital f/u visit.

## 2024-09-26 NOTE — PROGRESS NOTES
Jett Malik - Telemetry [facility] Medicine  Progress Note    Patient Name: Philip aKm Sr.  MRN: [id number]  Patient Class: IP- Inpatient   Admission Date: 9/25/2024  Length of Stay: 1 days  Attending Physician: Cherelle Luke MD  Primary Care Provider: Dylon Chase MD        Subjective:     Principal Problem:Hepatic encephalopathy        HPI:  Philip Kam, 78M with PMHx of alcohol-related cirrhosis, SCC of lip s/p resection, A-fib on pradaxa, CHF (G1DD), T2DM on toujeo and farxiga, hypothyroidism and HTN presents to ER with concerns from wife regarding confusion (unable to prick his finger prior to checking glucose levels and unable to use any electronics) and weakness when ambulating since yesterday after receiving paracentesis which removed over 10 L. Has had multiple paracentesis done with no prior complications. Takes lactulose TID with around 2 bowel movements each day. However, did not have any bowel movements yesterday and had 1 bowel movement the day prior. Wife states patient has been very sleepy for past days. Has minimal oral intake. He has a phlegm like mucus vomiting episode almost daily. Denies abdominal pain, chest pain, SOB, nausea, diarrhea, dysuria, hematuria.     In ED, patient afebrile and normotensive. CBC without leukocytosis. Troponin 0.114. Ammonia 148. CMP with BUN of 56 and Cr 3.2 considering for possible HRS. Total bilirubin 1.2. Patient given 20gm of lactulose resulting in bowel movement.        Overview/Hospital Course:  Patient admitted concern for hepatic encephalopathy. Rifaximin and Lactulose initiated, will titrate lactulose for 3BM/day. PT/OT ordered. Speech therapy consulted concern for secretions.     Interval History: NAEON. Afebrile, VSS on RA. Family states confusing improving. Will add rifaximin and up- titrate lactulose to 3 BM/day. PT/OT and speech ordered.     Review of Systems   Constitutional:  Negative for chills and fever.   Respiratory:  Negative for  shortness of breath.    Cardiovascular:  Negative for chest pain.   Gastrointestinal:  Negative for abdominal pain, constipation, diarrhea, nausea and vomiting.   Neurological:  Positive for weakness.   Psychiatric/Behavioral:  Positive for confusion.      Objective:     Vital Signs (Most Recent):  Temp: 97.5 °F (36.4 °C) (09/26/24 1145)  Pulse: 81 (09/26/24 1145)  Resp: 16 (09/26/24 1145)  BP: 103/73 (09/26/24 1145)  SpO2: 99 % (09/26/24 1145) Vital Signs (24h Range):  Temp:  [97.1 °F (36.2 °C)-98 °F (36.7 °C)] 97.5 °F (36.4 °C)  Pulse:  [79-86] 81  Resp:  [16-20] 16  SpO2:  [96 %-99 %] 99 %  BP: ()/(62-85) 103/73     Weight: 96.4 kg (212 lb 8.4 oz)  Body mass index is 32.31 kg/m².  No intake or output data in the 24 hours ending 09/26/24 1432      Physical Exam  Vitals and nursing note reviewed.   Constitutional:       Appearance: Normal appearance.   HENT:      Head: Normocephalic and atraumatic.   Eyes:      Conjunctiva/sclera: Conjunctivae normal.   Cardiovascular:      Rate and Rhythm: Normal rate and regular rhythm.      Pulses: Normal pulses.      Heart sounds: Normal heart sounds.   Pulmonary:      Effort: Pulmonary effort is normal.      Breath sounds: Normal breath sounds.   Abdominal:      General: There is distension.      Tenderness: There is no abdominal tenderness. There is no guarding or rebound.   Musculoskeletal:      Right lower leg: No edema.      Left lower leg: No edema.   Psychiatric:         Mood and Affect: Mood normal.         Behavior: Behavior normal.             Significant Labs: All pertinent labs within the past 24 hours have been reviewed.    Significant Imaging: I have reviewed all pertinent imaging results/findings within the past 24 hours.    Assessment/Plan:      * Hepatic encephalopathy  Philip Marroquinquin, 78M with PMHx of alcohol-related cirrhosis, SCC of lip s/p resection, A-fib on pradaxa, CHF (G1DD), T2DM on toujeo and farxiga, hypothyroidism and HTN presenting with  confusion and weakness concerning for hepatic encephalopathy.   Ammonia 148. LFTs wnl. T. Bilirubin 1.2      Plan:     [ ] Lactulose 20 gm TID, will up-titrate to achieve 3BM/day    [ ] Rifaximin     Stage 4 chronic kidney disease  Noted CKD 3b (Baseline SCR ~ 2.2-2.8) per nephrology. Cr on admission 3.2.       Plan:     [ ] Continue to monitor     Chronic diastolic heart failure  Results for orders placed during the hospital encounter of 06/19/24     Echo     Interpretation Summary    Left Ventricle: Normal wall thickness. There is concentric remodeling. There is normal systolic function. Biplane (2D) method of discs ejection fraction is 71%. Grade III diastolic dysfunction.    Right Ventricle: Normal right ventricular cavity size. Systolic function is normal. TAPSE is 1.85 cm.    Left Atrium: Left atrium is moderately dilated. The left atrium volume index is 44.1 mL/m2.    Right Atrium: Right atrium is mildly dilated.    Aortic Valve: There is moderate aortic valve sclerosis. Moderately restricted motion. There is moderate to severe stenosis. Aortic valve area by VTI is 0.99 cm². Aortic valve area by velocity is 0.95 cm². Aortic valve peak velocity is 2.26 m/s. Mean gradient is 12 mmHg. The dimensionless index is 0.30.    Mitral Valve: There is mild regurgitation.    Pulmonary Artery: The estimated pulmonary artery systolic pressure is 30 mmHg.    IVC/SVC: Normal venous pressure at 3 mmHg.     Does not appear euvolemic on examination.                             Plan:                           [ ] Continue to monitor     Acute kidney injury superimposed on CKD    Recent Labs     09/25/24  1611 09/26/24  0434   CREATININE 3.2* 2.8*   EGFRNORACEVR 19.1* 22.4*            Plan:   [ ] YARITZA is improving  [ ] Avoid nephrotoxins and renally dose meds for GFR listed above  [ ] Monitor urine output, serial BMP, and adjust therapy as needed  [ ] Consider consult to nephrology  [ ] Hold home lasix     Alcoholic cirrhosis of  liver with ascites  Patient with known Cirrhosis with Child's class B.     MELD-Na score calculated; MELD 3.0: 26 at 9/25/2024  4:11 PM  MELD-Na: 26 at 9/25/2024  4:11 PM  Calculated from:  Serum Creatinine: 3.2 mg/dL (Using max of 3 mg/dL) at 9/25/2024  4:11 PM  Serum Sodium: 133 mmol/L at 9/25/2024  4:11 PM  Total Bilirubin: 1.2 mg/dL at 9/25/2024  4:11 PM  Serum Albumin: 3.0 g/dL at 9/25/2024  4:11 PM  INR(ratio): 1.6 at 9/25/2024  3:19 PM  Age at listing (hypothetical): 78 years  Sex: Male at 9/25/2024  4:11 PM      Continue chronic meds. Etiology likely ETOH. Will avoid any hepatotoxic meds, and monitor CBC/CMP/INR for synthetic function.     Atrial fibrillation  On Pradaxa. Per cardiology note, eliquis contraindicated given Child-Damon class B cirrhosis. CHADS-VASc 4       Plan:     [ ] Continue home pradaxa    Hypothyroidism  Noted       Plan:     [ ] Continue levothyroxine 100 mcg    Type 2 diabetes mellitus with hyperglycemia, without long-term current use of insulin  On home Farxiga and Toujeo.       Plan:     [ ] POCT glucose     [ ] Will hold Farxiga given concern for YARITZA    [ ] Switch Toujeo to formulary medication    [ ] SSI      Essential hypertension  On home metoprolol succinate 50 mg.      Plan:     [ ] Will start Metoprolol Tartrate 25mg bid      [ ] Continue to monitor     Hyperlipidemia  Noted. On home simvastatin      Plan:     [ ] Atorvastatin 20mg      VTE Risk Mitigation (From admission, onward)           Ordered     dabigatran etexilate capsule 75 mg  2 times daily         09/25/24 2014     IP VTE HIGH RISK PATIENT  Once         09/25/24 1912                    Discharge Planning   MARY: 9/27/2024     Code Status: Full Code   Is the patient medically ready for discharge?:     Reason for patient still in hospital (select all that apply): Treatment  Discharge Plan A: Home, Home with family                  Mawadah Samad, MD  Department of Hospital Medicine   [facility] - Telemetry Stepdown

## 2024-09-26 NOTE — PLAN OF CARE
Jett Malik - Telemetry Stepdown  Initial Discharge Assessment       Primary Care Provider: Dylon Chase MD    Admission Diagnosis: Hepatic encephalopathy [K76.82]  YARITZA (acute kidney injury) [N17.9]  Chest pain [R07.9]  AMS (altered mental status) [R41.82]    Admission Date: 9/25/2024  Expected Discharge Date: 9/27/2024    Transition of Care Barriers: None    Payor: [employer] [facility] / Plan: [employer] CHOICES / Product Type: Medicare Advantage /     Extended Emergency Contact Information  Primary Emergency Contact: EddyIrwinMerna  Address: [address] [place] [address] [facility]  Home Phone: [phone]  Mobile Phone: [phone]  Relation: Spouse    Discharge Plan A: Home, Home with family  Discharge Plan B: Home, Home with family, Home Health      [facility] - [address]  [address]  [address]  [place] [address]  Phone: [phone] Fax: [phone]      Initial Assessment (most recent)       Adult Discharge Assessment - 09/26/24 1427          Discharge Assessment    Assessment Type Discharge Planning Assessment     Confirmed/corrected address, phone number and insurance Yes     Confirmed Demographics Correct on Facesheet     Source of Information patient;family     Communicated MARY with patient/caregiver Yes     Reason For Admission Hepatic Encephalopathy     People in Home spouse     Do you expect to return to your current living situation? Yes     Do you have help at home or someone to help you manage your care at home? Yes     Who are your caregiver(s) and their phone number(s)? Merna Kam - spouse - [phone]     Prior to hospitilization cognitive status: Alert/Oriented     Current cognitive status: Alert/Oriented     Walking or Climbing Stairs Difficulty yes     Walking or Climbing Stairs ambulation difficulty, requires equipment     Dressing/Bathing Difficulty no     Equipment Currently Used at Home walker,  rolling;cane, straight     Readmission within 30 days? No     Patient currently being followed by outpatient case management? No     Do you currently have service(s) that help you manage your care at home? No     Do you take prescription medications? Yes     Do you have prescription coverage? Yes     Do you have any problems affording any of your prescribed medications? No     Is the patient taking medications as prescribed? yes     Who is going to help you get home at discharge? Merna Kam - spouse - [phone]     How do you get to doctors appointments? family or friend will provide;car, drives self     Are you on dialysis? No     Do you take coumadin? No     Discharge Plan A Home;Home with family     Discharge Plan B Home;Home with family;Home Health     DME Needed Upon Discharge  other (see comments)   TBD    Discharge Plan discussed with: Patient;Spouse/sig other     Name(s) and Number(s) Merna Guerrero spouse - [phone]     Transition of Care Barriers None        Physical Activity    On average, how many days per week do you engage in moderate to strenuous exercise (like a brisk walk)? 0 days     On average, how many minutes do you engage in exercise at this level? 0 min        Financial Resource Strain    How hard is it for you to pay for the very basics like food, housing, medical care, and heating? Not hard at all        Housing Stability    In the last 12 months, was there a time when you were not able to pay the mortgage or rent on time? No     At any time in the past 12 months, were you homeless or living in a shelter (including now)? No        Transportation Needs    Has the lack of transportation kept you from medical appointments, meetings, work or from getting things needed for daily living? No        Food Insecurity    Within the past 12 months, you worried that your food would run out before you got the money to buy more. Never true     Within the past 12 months, the food you bought just  didn't last and you didn't have money to get more. Never true        Stress    Do you feel stress - tense, restless, nervous, or anxious, or unable to sleep at night because your mind is troubled all the time - these days? To some extent        Social Isolation    How often do you feel lonely or isolated from those around you?  Never        Alcohol Use    Q1: How often do you have a drink containing alcohol? Never     Q2: How many drinks containing alcohol do you have on a typical day when you are drinking? Patient does not drink     Q3: How often do you have six or more drinks on one occasion? Never        Utilities    In the past 12 months has the electric, gas, oil, or water company threatened to shut off services in your home? No        Health Literacy    How often do you need to have someone help you when you read instructions, pamphlets, or other written material from your doctor or pharmacy? Never        OTHER    Name(s) of People in Home Merna Kam - spouse - [phone]                      Completed initial assessment with the patient and Merna Kam - spouse - [phone]. Pt was independent with ADL's. Needs a walker at times to walk. No other needs noted upon admit. Discharge Plan A and Plan B have been determined by review of patient's clinical status, future medical and therapeutic needs, and coverage/benefits for post-acute care in coordination with multidisciplinary team members.  Severo Cast, [facility]  [phone]

## 2024-09-26 NOTE — HOSPITAL COURSE
Admitted for hepatic encephalopathy in setting of cirrhosis. Wife reports good effort to take lactulose at home as scheduled, though noted to have only 1-2 bowel movements in the days preceding presentation. Lactulose initiated with goal to titrate to 3-4 Bms per day, rifaxamin added (financial difficulties with rifaximin in past, thus pt was no longer taking; will coordinate with our pharmacy to see if we can facilitate securing medication upon discharge at a reasonable price) . Creatinine quite variable (2-2.8 of late); urine sodium suggesting component of intrarenal volume depletion. Given frequency of paracentesis, hold diuretic regimen. Creatinine with improvement to 2.8 (likely around pt's baseline). Retroperitoneal ultrasound without acute process. SLP consulted given pt reports of frequent episodes of emesis, though deemed to likely be 2/2 aversion to lactulose. SLP evaluated and cleared pt for diet without restrictions. PT/OT evaluated pt during admission and recommending HH w/ PT/OT + wheelchair for home use. Justification for wheelchair as written below:    Patient has a mobility limitation that significantly impairs their ability to participate in one or more mobility related activities of daily living in customary locations in the home. The mobility limitation cannot be sufficiently resolved by the use of a cane or walker. The use of a manual wheelchair will greatly improve the patient's ability to participate in MRADLs. The patient will use the wheelchair on a regular basis at home. They have expressed their willingness to use a manual wheelchair in the home, and have a caregiver who is available and willing to assist with the wheelchair if needed.     Pt mentation returned to baseline following optimization of lactulose therapy w/ frequent bowel movements and deemed to be medically stable for discharge to home with home health. Prior to discharging, spoke on phone with pt's PCP, Dr. Chase, regarding  patients care in hospital and plans for discharge. Per our conversation, pt has very close follow up with PCP. Due to being outside of the [facility] system, labs/orders/notes etc. from [facility] are not always transferable to pt's PCP who has had difficulties in the past coordinating multi-disciplinary care amongst different specialties managing the patient. Pt scheduled to follow up with Dr. Chase as well as with Hepatology next week. Pt discharged with instructions to take Dabigatran 75mg BID given renal function, and to hold from taking diuretic therapy and farxiga until following up with Dr. Chase. Instructed to continue taking Lactulose, and titrate dosing as needed to accomplish 3-4 bowel movements per day. Goals of care discussion took place with patient prior to discharge and family agreed to palliative care consult. Ambulatory referral placed and outpatient appointment scheduled.

## 2024-09-26 NOTE — SUBJECTIVE & OBJECTIVE
Interval History: NAEON. Afebrile, VSS on RA. Family states confusing improving. Will add rifaximin and up- titrate lactulose to 3 BM/day. PT/OT and speech ordered.     Review of Systems   Constitutional:  Negative for chills and fever.   Respiratory:  Negative for shortness of breath.    Cardiovascular:  Negative for chest pain.   Gastrointestinal:  Negative for abdominal pain, constipation, diarrhea, nausea and vomiting.   Neurological:  Positive for weakness.   Psychiatric/Behavioral:  Positive for confusion.      Objective:     Vital Signs (Most Recent):  Temp: 97.5 °F (36.4 °C) (09/26/24 1145)  Pulse: 81 (09/26/24 1145)  Resp: 16 (09/26/24 1145)  BP: 103/73 (09/26/24 1145)  SpO2: 99 % (09/26/24 1145) Vital Signs (24h Range):  Temp:  [97.1 °F (36.2 °C)-98 °F (36.7 °C)] 97.5 °F (36.4 °C)  Pulse:  [79-86] 81  Resp:  [16-20] 16  SpO2:  [96 %-99 %] 99 %  BP: ()/(62-85) 103/73     Weight: 96.4 kg (212 lb 8.4 oz)  Body mass index is 32.31 kg/m².  No intake or output data in the 24 hours ending 09/26/24 1432      Physical Exam  Vitals and nursing note reviewed.   Constitutional:       Appearance: Normal appearance.   HENT:      Head: Normocephalic and atraumatic.   Eyes:      Conjunctiva/sclera: Conjunctivae normal.   Cardiovascular:      Rate and Rhythm: Normal rate and regular rhythm.      Pulses: Normal pulses.      Heart sounds: Normal heart sounds.   Pulmonary:      Effort: Pulmonary effort is normal.      Breath sounds: Normal breath sounds.   Abdominal:      General: There is distension.      Tenderness: There is no abdominal tenderness. There is no guarding or rebound.   Musculoskeletal:      Right lower leg: No edema.      Left lower leg: No edema.   Psychiatric:         Mood and Affect: Mood normal.         Behavior: Behavior normal.             Significant Labs: All pertinent labs within the past 24 hours have been reviewed.    Significant Imaging: I have reviewed all pertinent imaging results/findings  within the past 24 hours.

## 2024-09-26 NOTE — ASSESSMENT & PLAN NOTE
Results for orders placed during the hospital encounter of 06/19/24     Echo     Interpretation Summary    Left Ventricle: Normal wall thickness. There is concentric remodeling. There is normal systolic function. Biplane (2D) method of discs ejection fraction is 71%. Grade III diastolic dysfunction.    Right Ventricle: Normal right ventricular cavity size. Systolic function is normal. TAPSE is 1.85 cm.    Left Atrium: Left atrium is moderately dilated. The left atrium volume index is 44.1 mL/m2.    Right Atrium: Right atrium is mildly dilated.    Aortic Valve: There is moderate aortic valve sclerosis. Moderately restricted motion. There is moderate to severe stenosis. Aortic valve area by VTI is 0.99 cm². Aortic valve area by velocity is 0.95 cm². Aortic valve peak velocity is 2.26 m/s. Mean gradient is 12 mmHg. The dimensionless index is 0.30.    Mitral Valve: There is mild regurgitation.    Pulmonary Artery: The estimated pulmonary artery systolic pressure is 30 mmHg.    IVC/SVC: Normal venous pressure at 3 mmHg.     Does not appear euvolemic on examination.                             Plan:                           [ ] Continue to monitor

## 2024-09-26 NOTE — ASSESSMENT & PLAN NOTE
Recent Labs     09/25/24  1611 09/26/24  0434   CREATININE 3.2* 2.8*   EGFRNORACEVR 19.1* 22.4*       Plan:   [ ] YARITZA is improving  [ ] Avoid nephrotoxins and renally dose meds for GFR listed above  [ ] Monitor urine output, serial BMP, and adjust therapy as needed  [ ] Consider consult to nephrology  [ ] Hold home lasix

## 2024-09-26 NOTE — PT/OT/SLP EVAL
Occupational Therapy  Co -  Evaluation with PT    Co-evaluation/treatment performed due to patient's multiple deficits requiring two skilled therapists to appropriately and safely assess patient's strength and endurance while facilitating functional tasks in addition to accommodating for patient's activity tolerance.     Other Staff Present:  Lizeth Strauss OT  (orientation)    Name: Philip Kam Sr.  MRN: [id number]  Admitting Diagnosis: Hepatic encephalopathy  Recent Surgery: * No surgery found *      Recommendations:     Discharge Recommendations: Low Intensity Therapy  Discharge Equipment Recommendations:  walker, rolling, shower chair  Barriers to discharge:   increased skilled A needed    DME Justification:     RW    Patient demonstrates a mobility limitation that significantly impairs their ability to participate in one or more mobility related activities of daily living. Patient's mobility limitation cannot be sufficiently resolved with the use of a cane, but can be sufficiently resolved with the use of a rolling walker.The use of a rolling walker will considerably improve their ability to participate in MRADLs. Patient will use the walker on a regular basis at home.       Assessment:     Philip Kam Sr. is a 78 y.o. male with a medical diagnosis of Hepatic encephalopathy.  He presents with performance deficits affecting function: weakness, impaired endurance, impaired self care skills, impaired functional mobility, gait instability, impaired balance, impaired cognition, decreased coordination, decreased lower extremity function, decreased upper extremity function, decreased safety awareness, edema, impaired coordination.     Pt engaged well in therapy this date, encountered sitting EOB with PT initiating evaluation.  Pt presents with pleasant demeanor and agreeable to therapy this date.  Pt Aox2, family able to provide additional history as needed.  This date, pt able to complete bed mobility with  "SBA, STS transfer with CGA/RW and functional mobility with CGA/RW.  Patient currently demonstrates a need for low intensity therapy on a scheduled basis secondary to a decline in functional status due to illness.       Rehab Prognosis: Good; patient would benefit from acute skilled OT services to address these deficits and reach maximum level of function.       Plan:     Patient to be seen 3 x/week to address the above listed problems via self-care/home management, therapeutic activities, therapeutic exercises  Plan of Care Expires: 10/26/24  Plan of Care Reviewed with: patient, spouse, family    Subjective     Chief Complaint: "My abdomen isn't usually this swollen"   Patient/Family Comments/goals: Get better, return home     Occupational Profile:  Pt questionable historian, completed with assistance of present family with pt's permission  Living Environment: Pt lives with wife, son, and grandson in [facility] with 0 [place], [facility] with 0 sc, + gb  Previous level of function: Pt reports independence with ADLs, wife reports pt has needed additional assistance with bathing and dressing in previous 2 weeks, as well as with transfers, and mod I with rollator with household distances  Roles and Routines: , father, grandfather.  Pt states he likes watching sports on TV  Equipment Used at Home: grab bar, rollator  Assistance upon Discharge: family    Pain/Comfort:  Pain Rating 1: 0/10  Pain Rating Post-Intervention 1: 0/10    Patients cultural, spiritual, Taoist conflicts given the current situation: no    Objective:     Communicated with: RN prior to session.  Patient found sitting edge of bed with PT, family present, and telemetry upon OT entry to room.    General Precautions: Standard, fall  Orthopedic Precautions: N/A  Braces: N/A  Respiratory Status: Room air    Occupational Performance:    Bed Mobility:    Patient completed Rolling/Turning to Right with stand by assistance (as reported by PT)  Patient completed " Scooting/Bridging:   To EOB with stand by assistance while seated (as reported by PT)  To HOB while supine with SBA  Patient completed Supine to Sit with stand by assistance (as reported by PT)  Patient completed Sit to Supine with stand by assistance  Pt able to sit EOB with good upright balance    Functional Mobility/Transfers:  Patient completed Sit <> Stand Transfer with contact guard assistance  with  rolling walker   Functional Mobility: Pt engaging in functional mobility to simulate household/community distances within hospital room  with CGA and utilizing RW in order to maximize functional activity tolerance and standing balance required for engagement in occupations of choice.  Pt took 1 standing rest break at sink in hospital room, and then asked to return to bed.  Pt presents with unsteadiness during mobility     Activities of Daily Living:  Grooming: pt politely declined this date    Upper Body Dressing: minimum assistance donning 2nd gown as robe,  managing gown throughout session     Cognitive/Visual Perceptual:  Cognitive/Psychosocial Skills:     -       Oriented to: AOX2 (person and place)   -       Follows Commands/attention:Follows multistep  commands  -       Communication: dysarthria  -       Memory: Impaired STM and Impaired LTM  -       Safety awareness/insight to disability: impaired   -       Mood/Affect/Coping skills/emotional control: Pleasant  Visual/Perceptual:      -Intact      Physical Exam:  Balance:    -       Sitting: Good.  Standing: Impaired  Postural examination/scapula alignment:    -       Rounded shoulders  Skin integrity: Visible skin intact  Edema:  ascites  Sensation:    -       Intact  Motor Planning:    -       Intact  Dominant hand:    -       right  Upper Extremity Range of Motion:     -       Right Upper Extremity: WFL  -       Left Upper Extremity: WFL  Upper Extremity Strength:    -       Right Upper Extremity: WFL  -       Left Upper Extremity: WFL   Strength:     -       Right Upper Extremity: WFL  -       Left Upper Extremity: WFL  Fine Motor Coordination:    -       Intact  Neurological:    -       Impaired    AMPAC 6 Click ADL:  AMPAC Total Score: 19    Treatment & Education:  Pt educated on role of OT, POC, and goals for therapy.    POC was dicussed with patient/caregiver, who was included in its development and is in agreement with the identified goals and treatment plan.   Patient and family aware of patient's deficits and therapy progression.   Time provided for therapeutic counseling and discussion of health disposition.   Educated on importance of EOB/OOB mobility, maintaining routine, sitting up in chair, and maximizing independence with ADLs during admission   Pt completed ADLs and functional mobility for treatment session as noted above   Pt/caregiver verbalized understanding and expressed no further concerns/questions.  Updated communication board with level of assist required      Patient left HOB elevated with all lines intact, call button in reach, bed alarm on, RN notified, and family present    GOALS:   Multidisciplinary Problems       Occupational Therapy Goals          Problem: Occupational Therapy    Goal Priority Disciplines Outcome Interventions   Occupational Therapy Goal     OT, PT/OT Progressing    Description: Goals to be met by: 10/26/24     Patient will increase functional independence with ADLs by performing:    UE Dressing with Set-up Assistance.  LE Dressing with Set-up Assistance.  Grooming while standing at sink with Stand-by Assistance.  Toileting from toilet with Stand-by Assistance for hygiene and clothing management.   Step transfer with Stand By Assistance with LRAD  Toilet transfer to toilet with Stand-by Assistance with LRAD                         History:     Past Medical History:   Diagnosis Date    Alcoholic cirrhosis of liver with ascites 7/7/2023    Anticoagulant long-term use     Anxiety     CHF (congestive heart failure)      High cholesterol     Hypertension     Hypothyroidism     Type 2 diabetes mellitus, uncontrolled          Past Surgical History:   Procedure Laterality Date    BACK SURGERY      COLONOSCOPY      COLONOSCOPY N/A 3/21/2023    Procedure: COLONOSCOPY;  Surgeon: Ronnie Hinton MD;  Location: [facility] ENDO;  Service: Endoscopy;  Laterality: N/A;    ESOPHAGOGASTRODUODENOSCOPY N/A 3/21/2023    Procedure: EGD (ESOPHAGOGASTRODUODENOSCOPY);  Surgeon: Ronnie Hinton MD;  Location: [facility] ENDO;  Service: Endoscopy;  Laterality: N/A;    ESOPHAGUS SURGERY  2012    EYE SURGERY      LUMBAR EPIDURAL INJECTION  01/2018    x2    TONSILLECTOMY      TREATMENT OF CARDIAC ARRHYTHMIA N/A 4/8/2021    Procedure: CARDIOVERSION;  Surgeon: Vinayak Eric MD;  Location: [facility] EP LAB;  Service: Cardiology;  Laterality: N/A;  a fib, dccv/hilario, mac, pr,, sscu    TREATMENT OF CARDIAC ARRHYTHMIA N/A 5/25/2021    Procedure: CARDIOVERSION;  Surgeon: Vinayak Eric MD;  Location: [facility] EP LAB;  Service: Cardiology;  Laterality: N/A;  AF, HILARIO (Cx if compliant), DCCV, MAC, UT, 3Prep       Time Tracking:     OT Date of Treatment: 09/26/24  OT Start Time: 1533  OT Stop Time: 1549  OT Total Time (min): 16 min    Billable Minutes:  Evaluation 8min  Therapeutic Activity 8min    9/26/2024

## 2024-09-27 ENCOUNTER — TELEPHONE (OUTPATIENT)
Dept: PHARMACY | Facility: CLINIC | Age: 79
End: 2024-09-27
Payer: MEDICARE

## 2024-09-27 ENCOUNTER — TELEPHONE (OUTPATIENT)
Dept: PALLIATIVE MEDICINE | Facility: CLINIC | Age: 79
End: 2024-09-27
Payer: MEDICARE

## 2024-09-27 VITALS
HEART RATE: 87 BPM | BODY MASS INDEX: 32.21 KG/M2 | OXYGEN SATURATION: 99 % | DIASTOLIC BLOOD PRESSURE: 66 MMHG | TEMPERATURE: 98 F | RESPIRATION RATE: 16 BRPM | SYSTOLIC BLOOD PRESSURE: 110 MMHG | WEIGHT: 212.5 LBS | HEIGHT: 68 IN

## 2024-09-27 LAB
ACINETOBACTER CALCOACETICUS/BAUMANNII COMPLEX: NOT DETECTED
ALBUMIN SERPL BCP-MCNC: 2.8 G/DL (ref 3.5–5.2)
ALP SERPL-CCNC: 104 U/L (ref 55–135)
ALT SERPL W/O P-5'-P-CCNC: 22 U/L (ref 10–44)
ANION GAP SERPL CALC-SCNC: 9 MMOL/L (ref 8–16)
AST SERPL-CCNC: 32 U/L (ref 10–40)
BACTEROIDES FRAGILIS: NOT DETECTED
BASOPHILS # BLD AUTO: 0.06 K/UL (ref 0–0.2)
BASOPHILS NFR BLD: 0.8 % (ref 0–1.9)
BILIRUB SERPL-MCNC: 1.5 MG/DL (ref 0.1–1)
BUN SERPL-MCNC: 57 MG/DL (ref 8–23)
CALCIUM SERPL-MCNC: 8.9 MG/DL (ref 8.7–10.5)
CANDIDA ALBICANS: NOT DETECTED
CANDIDA AURIS: NOT DETECTED
CANDIDA GLABRATA: NOT DETECTED
CANDIDA KRUSEI: NOT DETECTED
CANDIDA PARAPSILOSIS: NOT DETECTED
CANDIDA TROPICALIS: NOT DETECTED
CHLORIDE SERPL-SCNC: 99 MMOL/L (ref 95–110)
CO2 SERPL-SCNC: 23 MMOL/L (ref 23–29)
CREAT SERPL-MCNC: 3 MG/DL (ref 0.5–1.4)
CRYPTOCOCCUS NEOFORMANS/GATTII: NOT DETECTED
CTX-M GENE (ESBL PRODUCER): NORMAL
DIFFERENTIAL METHOD BLD: ABNORMAL
ENTEROBACTER CLOACAE COMPLEX: NOT DETECTED
ENTEROBACTERALES: NOT DETECTED
ENTEROCOCCUS FAECALIS: NOT DETECTED
ENTEROCOCCUS FAECIUM: NOT DETECTED
EOSINOPHIL # BLD AUTO: 0.2 K/UL (ref 0–0.5)
EOSINOPHIL NFR BLD: 2.6 % (ref 0–8)
ERYTHROCYTE [DISTWIDTH] IN BLOOD BY AUTOMATED COUNT: 13.6 % (ref 11.5–14.5)
ESCHERICHIA COLI: NOT DETECTED
EST. GFR  (NO RACE VARIABLE): 20.6 ML/MIN/1.73 M^2
GLUCOSE SERPL-MCNC: 160 MG/DL (ref 70–110)
HAEMOPHILUS INFLUENZAE: NOT DETECTED
HCT VFR BLD AUTO: 44.5 % (ref 40–54)
HGB BLD-MCNC: 15 G/DL (ref 14–18)
IMM GRANULOCYTES # BLD AUTO: 0.03 K/UL (ref 0–0.04)
IMM GRANULOCYTES NFR BLD AUTO: 0.4 % (ref 0–0.5)
IMP GENE (CARBAPENEM RESISTANT): NORMAL
INR PPP: 1.6 (ref 0.8–1.2)
KLEBSIELLA AEROGENES: NOT DETECTED
KLEBSIELLA OXYTOCA: NOT DETECTED
KLEBSIELLA PNEUMONIAE GROUP: NOT DETECTED
KPC RESISTANCE GENE (CARBAPENEM): NORMAL
LISTERIA MONOCYTOGENES: NOT DETECTED
LYMPHOCYTES # BLD AUTO: 0.9 K/UL (ref 1–4.8)
LYMPHOCYTES NFR BLD: 12.2 % (ref 18–48)
MAGNESIUM SERPL-MCNC: 2.6 MG/DL (ref 1.6–2.6)
MCH RBC QN AUTO: 34.6 PG (ref 27–31)
MCHC RBC AUTO-ENTMCNC: 33.7 G/DL (ref 32–36)
MCR-1: NORMAL
MCV RBC AUTO: 103 FL (ref 82–98)
MEC A/C AND MREJ (MRSA): NORMAL
MEC A/C: NORMAL
MONOCYTES # BLD AUTO: 0.6 K/UL (ref 0.3–1)
MONOCYTES NFR BLD: 8.2 % (ref 4–15)
NDM GENE (CARBAPENEM RESISTANT): NORMAL
NEISSERIA MENINGITIDIS: NOT DETECTED
NEUTROPHILS # BLD AUTO: 5.6 K/UL (ref 1.8–7.7)
NEUTROPHILS NFR BLD: 75.8 % (ref 38–73)
NRBC BLD-RTO: 0 /100 WBC
OXA-48-LIKE (CARBAPENEM RESISTANT): NORMAL
PHOSPHATE SERPL-MCNC: 3.9 MG/DL (ref 2.7–4.5)
PLATELET # BLD AUTO: 181 K/UL (ref 150–450)
PMV BLD AUTO: 10.2 FL (ref 9.2–12.9)
POCT GLUCOSE: 152 MG/DL (ref 70–110)
POCT GLUCOSE: 181 MG/DL (ref 70–110)
POCT GLUCOSE: 214 MG/DL (ref 70–110)
POTASSIUM SERPL-SCNC: 4.3 MMOL/L (ref 3.5–5.1)
PROT SERPL-MCNC: 7.3 G/DL (ref 6–8.4)
PROTEUS SPECIES: NOT DETECTED
PROTHROMBIN TIME: 17.3 SEC (ref 9–12.5)
PSEUDOMONAS AERUGINOSA: NOT DETECTED
RBC # BLD AUTO: 4.33 M/UL (ref 4.6–6.2)
SALMONELLA SP: NOT DETECTED
SERRATIA MARCESCENS: NOT DETECTED
SODIUM SERPL-SCNC: 131 MMOL/L (ref 136–145)
STAPHYLOCOCCUS AUREUS: NOT DETECTED
STAPHYLOCOCCUS EPIDERMIDIS: NOT DETECTED
STAPHYLOCOCCUS LUGDUNESIS: NOT DETECTED
STAPHYLOCOCCUS SPECIES: NOT DETECTED
STENOTROPHOMONAS MALTOPHILIA: NOT DETECTED
STREPTOCOCCUS AGALACTIAE: NOT DETECTED
STREPTOCOCCUS PNEUMONIAE: NOT DETECTED
STREPTOCOCCUS PYOGENES: NOT DETECTED
STREPTOCOCCUS SPECIES: NOT DETECTED
VAN A/B (VRE GENE): NORMAL
VIM GENE (CARBAPENEM RESISTANT): NORMAL
WBC # BLD AUTO: 7.43 K/UL (ref 3.9–12.7)

## 2024-09-27 PROCEDURE — 25000003 PHARM REV CODE 250

## 2024-09-27 PROCEDURE — 92610 EVALUATE SWALLOWING FUNCTION: CPT

## 2024-09-27 PROCEDURE — 85610 PROTHROMBIN TIME: CPT

## 2024-09-27 PROCEDURE — 25000003 PHARM REV CODE 250: Performed by: STUDENT IN AN ORGANIZED HEALTH CARE EDUCATION/TRAINING PROGRAM

## 2024-09-27 PROCEDURE — 84100 ASSAY OF PHOSPHORUS: CPT

## 2024-09-27 PROCEDURE — 85025 COMPLETE CBC W/AUTO DIFF WBC: CPT

## 2024-09-27 PROCEDURE — 80053 COMPREHEN METABOLIC PANEL: CPT

## 2024-09-27 PROCEDURE — 97535 SELF CARE MNGMENT TRAINING: CPT

## 2024-09-27 PROCEDURE — 83735 ASSAY OF MAGNESIUM: CPT

## 2024-09-27 PROCEDURE — 36415 COLL VENOUS BLD VENIPUNCTURE: CPT

## 2024-09-27 RX ORDER — DABIGATRAN ETEXILATE 75 MG/1
75 CAPSULE ORAL 2 TIMES DAILY
Qty: 60 CAPSULE | Refills: 11 | Status: SHIPPED | OUTPATIENT
Start: 2024-09-27

## 2024-09-27 RX ORDER — FOLIC ACID 1 MG/1
1000 TABLET ORAL DAILY
Qty: 30 TABLET | Refills: 6 | Status: SHIPPED | OUTPATIENT
Start: 2024-09-28 | End: 2025-04-26

## 2024-09-27 RX ORDER — LACTULOSE 10 G/15ML
20 SOLUTION ORAL ONCE
Status: COMPLETED | OUTPATIENT
Start: 2024-09-27 | End: 2024-09-27

## 2024-09-27 RX ORDER — FUROSEMIDE 40 MG/1
40 TABLET ORAL DAILY
Qty: 30 TABLET | Refills: 2 | Status: SHIPPED | OUTPATIENT
Start: 2024-09-27

## 2024-09-27 RX ORDER — LACTULOSE 10 G/15ML
20 SOLUTION ORAL; RECTAL 3 TIMES DAILY
Qty: 1892 ML | Refills: 6 | Status: SHIPPED | OUTPATIENT
Start: 2024-09-27

## 2024-09-27 RX ORDER — DABIGATRAN ETEXILATE 75 MG/1
75 CAPSULE ORAL 2 TIMES DAILY
Qty: 60 CAPSULE | Refills: 11 | Status: SHIPPED | OUTPATIENT
Start: 2024-09-27 | End: 2024-09-27

## 2024-09-27 RX ADMIN — LACTULOSE 20 G: 20 SOLUTION ORAL at 08:09

## 2024-09-27 RX ADMIN — SERTRALINE HYDROCHLORIDE 25 MG: 25 TABLET ORAL at 08:09

## 2024-09-27 RX ADMIN — LACTULOSE 20 G: 20 SOLUTION ORAL at 01:09

## 2024-09-27 RX ADMIN — PANTOPRAZOLE SODIUM 40 MG: 40 TABLET, DELAYED RELEASE ORAL at 08:09

## 2024-09-27 RX ADMIN — FOLIC ACID 1000 MCG: 1 TABLET ORAL at 08:09

## 2024-09-27 RX ADMIN — INSULIN GLARGINE 20 UNITS: 100 INJECTION, SOLUTION SUBCUTANEOUS at 08:09

## 2024-09-27 RX ADMIN — DABIGATRAN ETEXILATE MESYLATE 75 MG: 75 CAPSULE ORAL at 08:09

## 2024-09-27 RX ADMIN — RIFAXIMIN 550 MG: 550 TABLET ORAL at 08:09

## 2024-09-27 RX ADMIN — LEVOTHYROXINE SODIUM 100 MCG: 100 TABLET ORAL at 08:09

## 2024-09-27 RX ADMIN — TAMSULOSIN HYDROCHLORIDE 0.4 MG: 0.4 CAPSULE ORAL at 08:09

## 2024-09-27 RX ADMIN — METOPROLOL TARTRATE 12.5 MG: 25 TABLET, FILM COATED ORAL at 08:09

## 2024-09-27 NOTE — LETTER
September 27, 2024    Philip Kam Sr.  [address] [place] [address] [place] [address]             [facility]acy [facility]  [address]  [facility] [address]  Fax: [phone] Enmanuel Kam Sr.     It was a pleasure speaking with you. To follow up on our conversation on 9/27/2024, the Pharmacy Patient Assistance Program needs more information from you before we can submit your Xifaxan application to the [facility]. Please return the following documents to the Pharmacy Patient Assistance office ASAP.  Fax requested documentation to [phone] or email [email or web address]. Documentation can also be mailed to address listed below       Proof of household Income( such as social security statement, 1099 form, pension statement or 3 consecutive pay stubs  Copy of all Insurance cards( front and back)  Printout from your insurance or pharmacy that shows how much you've spent on prescriptions for the current year  Completed Medication Access Center Authorization Forms   Printout from your Insurance or Pharmacy that shows the cost of (requested medication) under your Plan      Whats Next:     Once I receive your documentation and authorization from your Provider, your application will be submitted to the [facility] for review. Please be advised it will take 2 to 4 weeks for your application to be processed so you may have to purchase a month's supply of medication from your pharmacy to hold you over during the waiting period. You will be notified of approval or denial by The Program(mail) or myself.      If you have any questions or concerns, please give me a call.       Sincerely   Farzaneh URBINA @[phone]  Pharmacy Patient Assistance  [address] [facility] [id number]  [place] [address]  Fax: [phone]  Email: [email or web address]

## 2024-09-27 NOTE — DISCHARGE INSTRUCTIONS
- Take Dabigatran at half of your original dose (meaning you should now be taking 75mg twice daily)  - Continue taking Lactulose, adjust dosing throughout the day as needed to try to accomplish 3-4 bowel movements per day   (If you have a day of only having one bowel movement, try to ensure you have an adequate number of bowel movements the following day)  - Hold from taking your Lasix and Farxiga until following up with Dr. Chase  - I have placed a referral for you to follow up with Palliative Care Medicine, please ensure you get this appointment scheduled as they can help with achieving the goals of your care moving forward

## 2024-09-27 NOTE — PT/OT/SLP EVAL
"Speech Language Pathology Evaluation  Bedside Swallow/ Discharge     Patient Name:  Philip Kam Sr.   MRN:  [id number]  Admitting Diagnosis: Hepatic encephalopathy    Recommendations:                 General Recommendations:  Follow-up not indicated  Diet recommendations:  Regular Diet - IDDSI Level 7, Thin liquids - IDDSI Level 0   Aspiration Precautions: Standard aspiration precautions   General Precautions: Standard, fall  Communication strategies:  none    Assessment:     Philip Kam Sr. is a 78 y.o. male with an SLP diagnosis of  functional swallow .      History:     Past Medical History:   Diagnosis Date    Alcoholic cirrhosis of liver with ascites 7/7/2023    Anticoagulant long-term use     Anxiety     CHF (congestive heart failure)     High cholesterol     Hypertension     Hypothyroidism     Type 2 diabetes mellitus, uncontrolled      Past Surgical History:   Procedure Laterality Date    BACK SURGERY      COLONOSCOPY      COLONOSCOPY N/A 3/21/2023    Procedure: COLONOSCOPY;  Surgeon: Ronnie Hinton MD;  Location: [facility];  Service: Endoscopy;  Laterality: N/A;    ESOPHAGOGASTRODUODENOSCOPY N/A 3/21/2023    Procedure: EGD (ESOPHAGOGASTRODUODENOSCOPY);  Surgeon: Ronnie Hinton MD;  Location: [facility];  Service: Endoscopy;  Laterality: N/A;    ESOPHAGUS SURGERY  2012    EYE SURGERY      LUMBAR EPIDURAL INJECTION  01/2018    x2    TONSILLECTOMY      TREATMENT OF CARDIAC ARRHYTHMIA N/A 4/8/2021    Procedure: CARDIOVERSION;  Surgeon: Vinayak Eric MD;  Location: [facility] EP LAB;  Service: Cardiology;  Laterality: N/A;  a fib, dccv/hilario, mac, pr,, sscu    TREATMENT OF CARDIAC ARRHYTHMIA N/A 5/25/2021    Procedure: CARDIOVERSION;  Surgeon: Vinayak Eric MD;  Location: [facility] EP LAB;  Service: Cardiology;  Laterality: N/A;  AF, HILARIO (Cx if compliant), DCCV, MAC, [place], 3Prep     Prior Intubation HX:  none    Modified Barium Swallow: none    Chest X-Rays: 9/25:"No acute cardiopulmonary disease."    Prior " diet: unrestricted     Subjective     Spoke with RN prior to session. Pt awake/alert and agreeable to ST. Spouse at bedside.     Pain/Comfort:  Pain Rating 1: 0/10  Pain Rating Post-Intervention 1: 0/10    Respiratory Status: Room air    Objective:     Oral Musculature Evaluation  Oral Musculature: WFL  Dentition: scattered dentition  Secretion Management: adequate  Mucosal Quality: adequate  Mandibular Strength and Mobility: WFL  Oral Labial Strength and Mobility: functional coordination, impaired pursing, impaired retraction (2/2 recent lip reconstruction surgery)  Lingual Strength and Mobility: WFL  Velar Elevation: WFL  Buccal Strength and Mobility: WFL  Volitional Cough: present  Volitional Swallow: timely  Voice Prior to PO Intake: clear    Bedside Swallow Eval:   Consistencies Assessed:  Thin liquids 8 oz straw sips - single and consecutive   Soft solids grits x 2 bites  Solids cracker x1      Oral Phase:   WFL    Pharyngeal Phase:   no overt clinical signs/symptoms of aspiration  no overt clinical signs/symptoms of pharyngeal dysphagia    Compensatory Strategies  None    Treatment: HOB elevated and pt able to self present all consistencies. He reports tolerance of medication pass and no difficulties with swallowing or current diet. RN reporting pt coughing up some phlegm following intake of liquids medications this AM, though no reported coughing./choking. Recommend he continue with a regular diet and thin liquids at this time., NO acute SLP needs. Education provided re: role of SLP, diet recs, swallow precs, s/s aspiration and POC.  Pt verbalized understanding and agreement.     Plan:     Plan of Care reviewed with:  patient, spouse   SLP Follow-Up:  No       Discharge recommendations:  No Therapy Indicated   Barriers to Discharge:  None    Time Tracking:     SLP Treatment Date:   09/27/24  Speech Start Time:  0908  Speech Stop Time:  0922     Speech Total Time (min):  14 min    Billable Minutes: Eval  Swallow and Oral Function 6 and Self Care/Home Management Training 8    09/27/2024

## 2024-09-27 NOTE — PLAN OF CARE
[facility] - Telemetry Stepdown      HOME HEALTH ORDERS  FACE TO FACE ENCOUNTER    Patient Name: Philip Kam Sr.  YOB: 1945    PCP: Dylon Chase MD   PCP Address: [address] of [facility] [address] / [place] [address]  PCP Phone Number: [phone]  PCP Fax: [phone]    Encounter Date: 9/25/24    Admit to Home Health    Diagnoses:  Active Hospital Problems    Diagnosis  POA    *Hepatic encephalopathy [K76.82]  Yes    Coagulopathy [D68.9]  Yes    Elevated troponin [R79.89]  Yes    Chronic diastolic heart failure [I50.32]  Yes    Stage 4 chronic kidney disease [N18.4]  Yes    Hyponatremia [E87.1]  Yes    Acute kidney injury superimposed on CKD [N17.9, N18.9]  Yes    Alcoholic cirrhosis of liver with ascites [K70.31]  Yes    Atrial fibrillation [I48.91]  Yes    Hypothyroidism [E03.9]  Yes    Essential hypertension [I10]  Yes    Acute on chronic diastolic heart failure [I50.33]  Yes    Hyperlipidemia [E78.5]  Yes    Type 2 diabetes mellitus with hyperglycemia, without long-term current use of insulin [E11.65]  Yes      Resolved Hospital Problems   No resolved problems to display.       Follow Up Appointments:  Future Appointments   Date Time Provider Department Center   10/2/2024  2:30 PM Valeriy Escobedo MD [facility]       Allergies:  Review of patient's allergies indicates:   Allergen Reactions    Codeine Itching       Medications: Review discharge medications with patient and family and provide education.    Current Facility-Administered Medications   Medication Dose Route Frequency Provider Last Rate Last Admin    atorvastatin tablet 20 mg  20 mg Oral QHS Samad, Mawadah, MD   20 mg at 09/26/24 2100    dabigatran etexilate capsule 75 mg  75 mg Oral BID Samad, Mawadah, MD   75 mg at 09/27/24 0835    dextrose 10% bolus 125 mL 125 mL  12.5 g Intravenous PRN Samad, Mawadah, MD        dextrose 10% bolus 250 mL 250 mL  25 g Intravenous PRN Samad, Mawadah, MD        folic acid tablet  "1,000 mcg  1,000 mcg Oral Daily Duncan Puga MD   1,000 mcg at 09/27/24 0835    glucagon (human recombinant) injection 1 mg  1 mg Intramuscular PRN Samad, Mawadah, MD        glucose chewable tablet 16 g  16 g Oral PRN Samad, Mawadah, MD        glucose chewable tablet 24 g  24 g Oral PRN Samad, Mawadah, MD        insulin aspart U-100 pen 0-5 Units  0-5 Units Subcutaneous QID (AC + HS) PRN Samad, Mawadah, MD   5 Units at 09/26/24 1607    insulin glargine U-100 (Lantus) pen 20 Units  20 Units Subcutaneous Daily Samad, Mawadah, MD   20 Units at 09/27/24 0836    lactulose 20 gram/30 mL solution Soln 20 g  20 g Oral TID Samad, Mawadah, MD   20 g at 09/27/24 0835    levothyroxine tablet 100 mcg  100 mcg Oral Daily Samad, Mawadah, MD   100 mcg at 09/27/24 0836    metoprolol tartrate (LOPRESSOR) split tablet 12.5 mg  12.5 mg Oral BID Cherelle Luke MD   12.5 mg at 09/27/24 0835    naloxone 0.4 mg/mL injection 0.02 mg  0.02 mg Intravenous PRN Samad, Mawadah, MD        pantoprazole EC tablet 40 mg  40 mg Oral Daily Samad, Mawadah, MD   40 mg at 09/27/24 0835    rifAXIMin tablet 550 mg  550 mg Oral BID Duncan Puga MD   550 mg at 09/27/24 0835    sertraline tablet 25 mg  25 mg Oral Daily Samad, Mawadah, MD   25 mg at 09/27/24 0835    sodium chloride 0.9% flush 10 mL  10 mL Intravenous Q12H PRN Samad, Mawadah, MD        tamsulosin 24 hr capsule 0.4 mg  1 capsule Oral Daily Samad, Mawadah, MD   0.4 mg at 09/27/24 0835     Facility-Administered Medications Ordered in Other Encounters   Medication Dose Route Frequency Provider Last Rate Last Admin    sodium chloride 0.9% bolus 1,000 mL  1,000 mL Intravenous Once Ernestine Ahn, ED            Medication List        CHANGE how you take these medications      dabigatran etexilate 75 mg Cap  Commonly known as: PRADAXA  Take 1 capsule (75 mg total) by mouth 2 (two) times a day. "Do NOT break, chew, or open capsules."  What changed:   medication strength  how much to take   "   FARXIGA 10 mg tablet  Generic drug: dapagliflozin propanediol  Take 10 mg by mouth.  Notes to patient: HOLD UNTIL FOLLOW UP WITH PCP      folic acid 1 MG tablet  Commonly known as: FOLVITE  Take 1 tablet (1,000 mcg total) by mouth once daily.  Start taking on: September 28, 2024  What changed:   medication strength  how much to take     furosemide 40 MG tablet  Commonly known as: LASIX  Take 1 tablet (40 mg total) by mouth once daily.  What changed: when to take this  Notes to patient: HOLD UNTIL FOLLOW UP WITH PCP      lactulose 10 gram/15 mL solution  Commonly known as: CHRONULAC  Take 30 mLs (20 g total) by mouth 3 (three) times daily. Titrate until having 3-4 bowel movements  What changed: additional instructions            CONTINUE taking these medications      albuterol 90 mcg/actuation inhaler  Commonly known as: PROVENTIL/VENTOLIN HFA     azelastine 137 mcg (0.1 %) nasal spray  Commonly known as: ASTELIN  1 spray by Nasal route 2 (two) times daily.     levothyroxine 100 MCG tablet  Commonly known as: SYNTHROID  Take 100 mcg by mouth once daily.     metoprolol succinate 50 MG 24 hr tablet  Commonly known as: TOPROL-XL  Take 1 tablet (50 mg total) by mouth once daily.     midodrine 5 MG Tab  Commonly known as: PROAMATINE  Take 5 mg by mouth every 12 (twelve) hours.     pantoprazole 40 MG tablet  Commonly known as: PROTONIX  Take 40 mg by mouth once daily.     sertraline 25 MG tablet  Commonly known as: ZOLOFT  Take 25 mg by mouth once daily.     simvastatin 40 MG tablet  Commonly known as: ZOCOR  Take 40 mg by mouth every evening.     tamsulosin 0.4 mg Cap  Commonly known as: FLOMAX  Take 1 capsule by mouth.     TOUJEO SOLOSTAR U-300 INSULIN 300 unit/mL (1.5 mL) Inpn pen  Generic drug: insulin glargine (TOUJEO)  Inject 35 Units into the skin once daily.     XIFAXAN 550 mg Tab  Generic drug: rifAXIMin  Take 1 tablet (550 mg total) by mouth 2 (two) times daily.                I have seen and examined this  patient within the last 30 days. My clinical findings that support the need for the home health skilled services and home bound status are the following:no   Requiring assistive device to leave home due to unsteady gait caused by  Weakness/Debility.     Diet:   regular diet and fluid consistency diet  thin    Labs:  none    Referrals/ Consults  Physical Therapy to evaluate and treat. Evaluate for home safety and equipment needs; Establish/upgrade home exercise program. Perform / instruct on therapeutic exercises, gait training, transfer training, and Range of Motion.  Occupational Therapy to evaluate and treat. Evaluate home environment for safety and equipment needs. Perform/Instruct on transfers, ADL training, ROM, and therapeutic exercises.    Activities:   activity as tolerated    Nursing:   Agency to admit patient within 24 hours of hospital discharge unless specified on physician order or at patient request    SN to complete comprehensive assessment including routine vital signs. Instruct on disease process and s/s of complications to report to MD. Review/verify medication list sent home with the patient at time of discharge  and instruct patient/caregiver as needed. Frequency may be adjusted depending on start of care date.     Skilled nurse to perform up to 3 visits PRN for symptoms related to diagnosis    Notify MD if SBP > 160 or < 90; DBP > 90 or < 50; HR > 120 or < 50; Temp > 101; O2 < 88%;    Ok to schedule additional visits based on staff availability and patient request on consecutive days within the home health episode.    When multiple disciplines ordered:    Start of Care occurs on Sunday - Wednesday schedule remaining discipline evaluations as ordered on separate consecutive days following the start of care.    Thursday SOC -schedule subsequent evaluations Friday and Monday the following week.     Friday - Saturday SOC - schedule subsequent discipline evaluations on consecutive days starting  Monday of the following week.    For all post-discharge communication and subsequent orders please contact patient's primary care physician. If unable to reach primary care physician or do not receive response within 30 minutes, please contact Primary Care Physician for clinical staff order clarification    Miscellaneous     N/a  Home Health Aide:  Physical Therapy Three times weekly and Occupational Therapy Three times weekly    Wound Care Orders  no    I certify that this patient is confined to his home and needs physical therapy and occupational therapy.

## 2024-09-27 NOTE — DISCHARGE SUMMARY
[facility] - Telemetry [facility] Medicine  Discharge Summary      Patient Name: Philip Kam Sr.  MRN: [id number]  JOE: [id number]  Patient Class: IP- Inpatient  Admission Date: 9/25/2024  Hospital Length of Stay: 2 days  Discharge Date and Time:  09/27/2024 4:24 PM  Attending Physician: Umu att. providers found   Discharging Provider: Duncan Puga MD  Primary Care Provider: Dylon Chase MD  [facility] Medicine Team: [facility] MED 3 Duncan Puga MD  Primary Care Team: [facility] MED 3    HPI:   Philip Kam, 78M with PMHx of alcohol-related cirrhosis, SCC of lip s/p resection, A-fib on pradaxa, CHF (G1DD), T2DM on toujeo and farxiga, hypothyroidism and HTN presents to ER with concerns from wife regarding confusion (unable to prick his finger prior to checking glucose levels and unable to use any electronics) and weakness when ambulating since yesterday after receiving paracentesis which removed over 10 L. Has had multiple paracentesis done with no prior complications. Takes lactulose TID with around 2 bowel movements each day. However, did not have any bowel movements yesterday and had 1 bowel movement the day prior. Wife states patient has been very sleepy for past days. Has minimal oral intake. He has a phlegm like mucus vomiting episode almost daily. Denies abdominal pain, chest pain, SOB, nausea, diarrhea, dysuria, hematuria.     In ED, patient afebrile and normotensive. CBC without leukocytosis. Troponin 0.114. Ammonia 148. CMP with BUN of 56 and Cr 3.2 considering for possible HRS. Total bilirubin 1.2. Patient given 20gm of lactulose resulting in bowel movement.        * No surgery found *      Hospital Course:   Admitted for hepatic encephalopathy in setting of cirrhosis. Wife reports good effort to take lactulose at home as scheduled, though noted to have only 1-2 bowel movements in the days preceding presentation. Lactulose initiated with goal to titrate to 3-4 Bms per day, rifaxamin added  (financial difficulties with rifaximin in past, thus pt was no longer taking; will coordinate with our pharmacy to see if we can facilitate securing medication upon discharge at a reasonable price) . Creatinine quite variable (2-2.8 of late); urine sodium suggesting component of intrarenal volume depletion. Given frequency of paracentesis, hold diuretic regimen. Creatinine with improvement to 2.8 (likely around pt's baseline). Retroperitoneal ultrasound without acute process. SLP consulted given pt reports of frequent episodes of emesis, though deemed to likely be 2/2 aversion to lactulose. SLP evaluated and cleared pt for diet without restrictions. PT/OT evaluated pt during admission and recommending HH w/ PT/OT + wheelchair for home use. Justification for wheelchair as written below:    Patient has a mobility limitation that significantly impairs their ability to participate in one or more mobility related activities of daily living in customary locations in the home. The mobility limitation cannot be sufficiently resolved by the use of a cane or walker. The use of a manual wheelchair will greatly improve the patient's ability to participate in MRADLs. The patient will use the wheelchair on a regular basis at home. They have expressed their willingness to use a manual wheelchair in the home, and have a caregiver who is available and willing to assist with the wheelchair if needed.     Pt mentation returned to baseline following optimization of lactulose therapy w/ frequent bowel movements and deemed to be medically stable for discharge to home with home health. Prior to discharging, spoke on phone with pt's PCP, Dr. Chase, regarding patients care in hospital and plans for discharge. Per our conversation, pt has very close follow up with PCP. Due to being outside of the [facility] system, labs/orders/notes etc. from [facility] are not always transferable to pt's PCP who has had difficulties in the past coordinating  multi-disciplinary care amongst different specialties managing the patient. Pt scheduled to follow up with Dr. Chase as well as with Hepatology next week. Pt discharged with instructions to take Dabigatran 75mg BID given renal function, and to hold from taking diuretic therapy and farxiga until following up with Dr. Chase. Instructed to continue taking Lactulose, and titrate dosing as needed to accomplish 3-4 bowel movements per day. Goals of care discussion took place with patient prior to discharge and family agreed to palliative care consult. Ambulatory referral placed and outpatient appointment scheduled.      Goals of Care Treatment Preferences:  Code Status: Full Code      SDOH Screening:  The patient was screened for utility difficulties, food insecurity, transport difficulties, housing insecurity, and interpersonal safety and there were no concerns identified this admission.     Consults:     No new Assessment & Plan notes have been filed under this hospital service since the last note was generated.  Service: Hospital Medicine    Final Active Diagnoses:    Diagnosis Date Noted POA    PRINCIPAL PROBLEM:  Hepatic encephalopathy [K76.82] 04/24/2024 Yes    Coagulopathy [D68.9] 09/26/2024 Yes    Elevated troponin [R79.89] 09/26/2024 Yes    Chronic diastolic heart failure [I50.32] 09/25/2024 Yes    Stage 4 chronic kidney disease [N18.4] 09/25/2024 Yes    Hyponatremia [E87.1] 04/25/2024 Yes    Acute kidney injury superimposed on CKD [N17.9, N18.9] 04/25/2024 Yes    Alcoholic cirrhosis of liver with ascites [K70.31] 07/07/2023 Yes    Atrial fibrillation [I48.91] 05/25/2021 Yes    Hypothyroidism [E03.9] 03/13/2021 Yes    Essential hypertension [I10] 03/09/2018 Yes    Acute on chronic diastolic heart failure [I50.33] 03/09/2018 Yes    Hyperlipidemia [E78.5] 03/09/2018 Yes    Type 2 diabetes mellitus with hyperglycemia, without long-term current use of insulin [E11.65]  Yes      Problems Resolved During this  "Admission:       Discharged Condition: stable    Disposition: Home or Self Care    Follow Up:   Follow-up Information       Dylon Chase MD Follow up.    Specialty: Family Medicine  Why: Pt's primary care office doesn't allow for third party scheduling. Pt has to call to schedule his own hospital f/u visit.  Contact information:  [address]  [address]  [place] [address]  [phone]                           Patient Instructions:      WHEELCHAIR FOR HOME USE     Order Specific Question Answer Comments   Hours in W/C per day: 12    Type of Wheelchair: Standard    Size(Width): 18"(STD adult)    Leg Support: STD footrests    Leg Support: Swing Away    Lap Belt: Buckle    Accessories: Anti-tippers    Cushion: Basic    Reclining Back No    Height: 5' 8" (1.727 m)    Weight: 96.4 kg (212 lb 8.4 oz)    Does patient have medical equipment at home? grab bar    Does patient have medical equipment at home? rollator    Length of need (1-99 months): 99    Please check all that apply: Caregiver is capable and willing to operate wheelchair safely.    Please check all that apply: Patient mobility limitations cannot be sufficiently resolved by the use of other ambulatory therapies.    Please check all that apply: The patient requires the use of a w/c for activities of daily living within the Home.      Ambulatory referral/consult to CLINIC Palliative Care   Standing Status: Future   Referral Priority: Routine Referral Type: Consultation   Requested Specialty: Palliative Medicine   Number of Visits Requested: 1       Significant Diagnostic Studies: Labs: All labs within the past 24 hours have been reviewed    Pending Diagnostic Studies:       None           Medications:  Reconciled Home Medications:      Medication List        CHANGE how you take these medications      dabigatran etexilate 75 mg Cap  Commonly known as: PRADAXA  Take 1 capsule (75 mg total) by mouth 2 (two) times a day. "Do NOT break, chew, or open " "capsules."  What changed:   medication strength  how much to take     FARXIGA 10 mg tablet  Generic drug: dapagliflozin propanediol  Take 10 mg by mouth.  Notes to patient: HOLD UNTIL FOLLOW UP WITH PCP      folic acid 1 MG tablet  Commonly known as: FOLVITE  Take 1 tablet (1,000 mcg total) by mouth once daily.  Start taking on: September 28, 2024  What changed:   medication strength  how much to take     furosemide 40 MG tablet  Commonly known as: LASIX  Take 1 tablet (40 mg total) by mouth once daily.  What changed: when to take this  Notes to patient: HOLD UNTIL FOLLOW UP WITH PCP      lactulose 10 gram/15 mL solution  Commonly known as: CHRONULAC  Take 30 mLs (20 g total) by mouth 3 (three) times daily. Titrate until having 3-4 bowel movements  What changed: additional instructions            CONTINUE taking these medications      albuterol 90 mcg/actuation inhaler  Commonly known as: PROVENTIL/VENTOLIN HFA     azelastine 137 mcg (0.1 %) nasal spray  Commonly known as: ASTELIN  1 spray by Nasal route 2 (two) times daily.     levothyroxine 100 MCG tablet  Commonly known as: SYNTHROID  Take 100 mcg by mouth once daily.     metoprolol succinate 50 MG 24 hr tablet  Commonly known as: TOPROL-XL  Take 1 tablet (50 mg total) by mouth once daily.     midodrine 5 MG Tab  Commonly known as: PROAMATINE  Take 5 mg by mouth every 12 (twelve) hours.     pantoprazole 40 MG tablet  Commonly known as: PROTONIX  Take 40 mg by mouth once daily.     sertraline 25 MG tablet  Commonly known as: ZOLOFT  Take 25 mg by mouth once daily.     simvastatin 40 MG tablet  Commonly known as: ZOCOR  Take 40 mg by mouth every evening.     tamsulosin 0.4 mg Cap  Commonly known as: FLOMAX  Take 1 capsule by mouth.     TOUJEO SOLOSTAR U-300 INSULIN 300 unit/mL (1.5 mL) Inpn pen  Generic drug: insulin glargine (TOUJEO)  Inject 35 Units into the skin once daily.     XIFAXAN 550 mg Tab  Generic drug: rifAXIMin  Take 1 tablet (550 mg total) by mouth 2 " (two) times daily.              Indwelling Lines/Drains at time of discharge:   Lines/Drains/Airways       None                   Time spent on the discharge of patient: 45 minutes         Duncan Puga MD  Department of Hospital Medicine  [facility] - Telemetry Stepdown

## 2024-09-27 NOTE — TELEPHONE ENCOUNTER
Philip Kam Sr. has been informed of the [facility] application process for Xifaxan and what's required to apply.  He will provide the following documents: Proof of household Income( such as social security statement, 1099 form, pension statement or 3 consecutive pay stubs, Copy of all Insurance cards( front and back), Printout from your insurance or pharmacy that shows how much you've spent on prescriptions for the current year, Completed Medication Access Center Authorization Forms , and Printout from your Insurance or Pharmacy that shows the cost of (requested medication) under your Plan        Follow-up will be made in 5 business days.     Farzaneh Mckenzie  Pharmacy Patient Assistance Team

## 2024-09-27 NOTE — TELEPHONE ENCOUNTER
----- Message from Shira Orellana PharmD sent at 9/27/2024 11:24 AM CDT -----  Regarding: Rifaximin  Good morning,     This patient is being discharged today and will need help getting his OP rifaximin. If you all could please reach out to help to help I would really appreciate it!     Thanks,   Shira Orellana PharmD, BCPS  Clinical Pharmacist - Internal Medicine   [id number]

## 2024-09-27 NOTE — PLAN OF CARE
Problem: Skin Injury Risk Increased  Goal: Skin Health and Integrity  Outcome: Met     Problem: Adult Inpatient Plan of Care  Goal: Plan of Care Review  Outcome: Adequate for Care Transition  Goal: Patient-Specific Goal (Individualized)  Outcome: Adequate for Care Transition  Goal: Absence of Hospital-Acquired Illness or Injury  Outcome: Met  Goal: Optimal Comfort and Wellbeing  Outcome: Met  Goal: Readiness for Transition of Care  Outcome: Met     Problem: Diabetes Comorbidity  Goal: Blood Glucose Level Within Targeted Range  Outcome: Met     Problem: Wound  Goal: Optimal Coping  Outcome: Adequate for Care Transition  Goal: Optimal Functional Ability  Outcome: Adequate for Care Transition  Goal: Absence of Infection Signs and Symptoms  Outcome: Met  Goal: Improved Oral Intake  Outcome: Met  Goal: Optimal Pain Control and Function  Outcome: Met  Goal: Skin Health and Integrity  Outcome: Met  Goal: Optimal Wound Healing  Outcome: Met     Problem: Fall Injury Risk  Goal: Absence of Fall and Fall-Related Injury  Outcome: Met     Problem: Acute Kidney Injury/Impairment  Goal: Fluid and Electrolyte Balance  Outcome: Adequate for Care Transition  Goal: Improved Oral Intake  Outcome: Adequate for Care Transition  Goal: Effective Renal Function  Outcome: Adequate for Care Transition   Pt is discharged to home. Wheelchair per case management will be delivered to home. Pt is being transported by wife in personal car, at 1420. All bedside meds delivered dc instructions reviewed, and iv access removed.

## 2024-09-27 NOTE — PLAN OF CARE
09/27/24 1404   Post-Acute Status   Post-Acute Authorization Home Health   Home Health Status Referrals Sent   Discharge Plan   Discharge Plan A Home;Home with family;Home Health   Discharge Plan B Home;Home with family;Home Health     Met with patient and Merna Kam - Spouse -   [phone] to review discharge recommendation of HH and is agreeable to plan    Patient/family provided list of facilities in-network with patient's payor plan. Providers that are owned, operated, or affiliated with [facility] are included on the list.     Notified that referral sent to below listed facilities from in-network list based on proximity to home/family support:    [facility]  2.    [facility]  3.  4.  5. (can send more than 5)    Patient/family instructed to identify preference.    Preferred Facility: (if more than 1, listed in order of descending preference)  1.    If an additional preferred facility not listed above is identified, additional referral to be sent. If above facilities unable to accept, will send additional referrals to in-network providers.    Discharge Plan A and Plan B have been determined by review of patient's clinical status, future medical and therapeutic needs, and coverage/benefits for post-acute care in coordination with multidisciplinary team members.  Severo Cast, [facility]    [facility]  [phone]

## 2024-09-30 NOTE — PLAN OF CARE
[facility] - Telemetry Stepdown  Discharge Final Note    Primary Care Provider: Dylon Chase MD    Expected Discharge Date: 9/27/2024    Final Discharge Note (most recent)       Final Note - 09/30/24 1322          Final Note    Assessment Type Final Discharge Note     Anticipated Discharge Disposition Home-Health Care Svc        Post-Acute Status    Post-Acute Authorization Home Health     Home Health Status Set-up Complete/Auth obtained     Discharge Delays None known at this time                     Important Message from Medicare             Contact Info       Dylon Chase MD   Specialty: Family Medicine   Relationship: PCP - General    [address]  [address]  [facility] [place] [address]   Phone: [phone]       Next Steps: Follow up    Instructions: Pt's primary care office doesn't allow for third party scheduling. Pt has to call to schedule his own hospital f/u visit.            Pt D/C home with family. Pt setup with [facility] for .  No other needs noted upon D/C.  Discharge Plan A and Plan B have been determined by review of patient's clinical status, future medical and therapeutic needs, and coverage/benefits for post-acute care in coordination with multidisciplinary team members.  Severo Cast, [facility]    [facility]  [phone]

## 2024-10-02 LAB
BACTERIA BLD CULT: ABNORMAL

## 2025-05-09 NOTE — ASSESSMENT & PLAN NOTE
Dear Parent,    Sven recently had a blood test done at our office.  The following are the results:    HEMOGLOBIN LEVEL:    Desirable value:  10.5 g/dL or greater    Bowie's level: HGB (g/dL)       Date                     Value                 05/02/2025               12.2             ----------        Sven's hemoglobin level is normal.  We screen for this because a low hemoglobin (or anemia) may be the result of iron deficiency.  Iron deficiency can lead to learning problems as a child grows older. We routinely check lead levels at 12 months and again at 2 years of age.     LEAD LEVEL:    Desirable level: Less than 5 mcg/dL    Sven's value:  Lead, Blood Capillary (mcg/dL)       Date                     Value                 05/02/2025               <2.0             ----------      Sven's lead level is normal.  We screen for this because elevated lead levels can be associated with behavior or learning problems.  We routinely check lead levels at 12 months and again at 2 years of age.     If you have any questions about these results or any other aspect of your child's health, please contact us at our office.    Sincerely,    Gil Rock MD     Philip Marroquinquin, 78M with PMHx of alcohol-related cirrhosis, SCC of lip s/p resection, A-fib on pradaxa, CHF (G1DD), T2DM on toujeo and farxiga, hypothyroidism and HTN presenting with confusion and weakness concerning for hepatic encephalopathy.   Ammonia 148. LFTs wnl. T. Bilirubin 1.2      Plan:     [ ] Lactulose 20 gm TID, will up-titrate to achieve 3BM/day    [ ] Rifaximin

## (undated) DEVICE — GLOVE 7.5 PROTEXIS PI BLUE

## (undated) DEVICE — SEE MEDLINE ITEM 156905

## (undated) DEVICE — PAD DEFIB CADENCE ADULT R2

## (undated) DEVICE — BURR MIS CURVED 3.0MM

## (undated) DEVICE — APPLICATOR CHLORAPREP ORN 26ML

## (undated) DEVICE — NDL SPINAL SPINOCAN 22GX3.5

## (undated) DEVICE — SEE MEDLINE ITEM 157116

## (undated) DEVICE — CLOSURE SKIN STERI STRIP 1/2X4

## (undated) DEVICE — DRAPE STERI-DRAPE 1000 17X11IN

## (undated) DEVICE — DRAPE C-ARM/MOBILE XRAY 44X80

## (undated) DEVICE — DRESSING TEGADERM 2 3/8 X 2.75

## (undated) DEVICE — GLOVE 6.0 PROTEXIS PI BLUE

## (undated) DEVICE — BLADE ELECTRO EDGE INSULATED

## (undated) DEVICE — SEE MEDLINE ITEM 146292

## (undated) DEVICE — SEE MEDLINE ITEM 147518

## (undated) DEVICE — CORD BIPOLAR 12 FOOT

## (undated) DEVICE — SUT MONOCRYL 4-0 PS-2

## (undated) DEVICE — DRESSING SURGICAL 1/2X1/2

## (undated) DEVICE — COVER OVERHEAD SURG LT BLUE

## (undated) DEVICE — DRESSING AQUACEL FOAM 5 X 5

## (undated) DEVICE — GLOVE PROTEXIS HYDROGEL SZ6

## (undated) DEVICE — SPONGE GAUZE 16PLY 4X4

## (undated) DEVICE — SEE MEDLINE ITEM 157148

## (undated) DEVICE — GAUZE SPONGE 4X4 12PLY

## (undated) DEVICE — SEE MEDLINE ITEM 157125

## (undated) DEVICE — DRESSING MEPILEX BORDER 4 X 4

## (undated) DEVICE — TRAY FOLEY 16FR INFECTION CONT

## (undated) DEVICE — SEE MEDLINE ITEM 146313

## (undated) DEVICE — SEE MEDLINE ITEM 157150

## (undated) DEVICE — SUT 0 VICRYL / UR6 (J603)

## (undated) DEVICE — DRESSING TELFA N ADH 3X8

## (undated) DEVICE — DRAPE OPMI STERILE

## (undated) DEVICE — ALCOHOL 70% ISOP W/GREEN 16OZ

## (undated) DEVICE — TOWEL OR NONABSORB ADH 17X26

## (undated) DEVICE — ELECTRODE REM PLYHSV RETURN 9

## (undated) DEVICE — ADHESIVE MASTISOL VIAL 48/BX

## (undated) DEVICE — SEE MEDLINE ITEM 157117

## (undated) DEVICE — GLOVE PROTEXIS HYDROGEL SZ7